# Patient Record
Sex: MALE | Race: WHITE | NOT HISPANIC OR LATINO | Employment: OTHER | URBAN - METROPOLITAN AREA
[De-identification: names, ages, dates, MRNs, and addresses within clinical notes are randomized per-mention and may not be internally consistent; named-entity substitution may affect disease eponyms.]

---

## 2022-01-20 ENCOUNTER — TELEPHONE (OUTPATIENT)
Dept: HEMATOLOGY ONCOLOGY | Facility: CLINIC | Age: 77
End: 2022-01-20

## 2022-01-20 NOTE — TELEPHONE ENCOUNTER
Soft Intake Form   Patient Details   Jeannine Loya     1945     [id number]     Reason For Appointment   Who is Calling? [facility]   If not patient, Name? Who is the Referring Doctor? Self Referral   What is the diagnosis? Stage 4 Lung Cancer   Has this diagnosis been confirmed by a biopsy/surgery? If yes, what is the date it was done? Yes   Biopsy done at [facility]? If not, where was it done? No, [address] [facility]   Was imaging done, and was it done at [facility]? If not, where was it done? Yes, [facility] [place], [place]   Have you been seen by another Oncologist?  If so, who and where (name of facility, [facility] and state) Yes, Dr Brenda Campbell  [address], [place]  [phone]   For 2nd Opinions Only: Are you currently undergoing treatment, or are you scheduled to start treatment? If yes, name of facility, [facility] and state     For "History Of" only: Have you completed treatment? Have you had Genetic Testing done in the past?  If so, advise to bring test results to their visit No   Record Gathering Information   Did you advise to have records faxed to [phone]? Yes   Did you advise to have disks sent to the proper address with imaging? ("History of" Patients)  5 years of imaging for breast patients-Mammos, US etc Yes   Scheduling Information   What is the best call back number?    (If the [facility] is calling, please use their number) [phone]   Miscellaneous Information      Patient lives in [place]

## 2022-02-02 ENCOUNTER — DOCUMENTATION (OUTPATIENT)
Dept: HEMATOLOGY ONCOLOGY | Facility: CLINIC | Age: 77
End: 2022-02-02

## 2022-02-02 NOTE — PROGRESS NOTES
Received both paper records and Imaging disks from [facility] [place] [place] [facility]  Imaging disks sent to [facility] radiology reading room  Paper records forwarded to [facility] to upload into Epic

## 2022-02-04 ENCOUNTER — HOSPITAL ENCOUNTER (OUTPATIENT)
Dept: RADIOLOGY | Facility: HOSPITAL | Age: 77
Discharge: HOME | End: 2022-02-04
Attending: RADIOLOGY

## 2022-02-04 DIAGNOSIS — Z76.89 REFERRAL OF PATIENT WITHOUT EXAMINATION OR TREATMENT: ICD-10-CM

## 2022-02-07 ENCOUNTER — DOCUMENTATION (OUTPATIENT)
Dept: HEMATOLOGY ONCOLOGY | Facility: CLINIC | Age: 77
End: 2022-02-07

## 2022-02-07 NOTE — PROGRESS NOTES
Nika Mckinley returned my call  He states that he does not need the disks from his imaging and would like for them to be destroyed  He inquired about getting set up with a primary care physician for when he finishes moving into [place] in May  I provided him with the [facility]'s website and suggested he browse the available providers and call to schedule an appointment to initiate care  I reminded him to have his ins info available  He was appreciative for the assistance

## 2022-02-07 NOTE — PROGRESS NOTES
I reached out and left VM for Octavio to let him know that the images from [facility] have been uploaded into our system and are ready for his initial visit with Dr Mary Byers in June  I was inquiring if he would like me to send him the disks of his imaging for his personal record or if he would like me to have the disks destroyed  I left my return number [phone]

## 2022-03-18 ENCOUNTER — TELEPHONE (OUTPATIENT)
Dept: HEMATOLOGY ONCOLOGY | Facility: HOSPITAL | Age: 77
End: 2022-03-18

## 2022-03-18 NOTE — TELEPHONE ENCOUNTER
I left message for patient to let him know that the dr's schedule has changed and we are moving his appt from [email or web address] to Monday, [email or web address]  I also left him the number for the [facility] AT [place] should he need to change his appt

## 2022-04-04 ENCOUNTER — TELEPHONE (OUTPATIENT)
Dept: HEMATOLOGY ONCOLOGY | Facility: CLINIC | Age: 77
End: 2022-04-04

## 2022-04-04 NOTE — TELEPHONE ENCOUNTER
Appointment Cancellation Or Reschedule     Person calling in patient   Provider [facility] Visit Date and Time 6/13 @ 940   Office Visit Location [place]   Did patient want to reschedule their office appointment? If so, when was it scheduled to? Yes, 5/18 @ 940   Is this patient calling to reschedule an infusion appointment? no   When is their next infusion appointment? n/a   Is this patient a Chemo patient? no   Reason for Cancellation or Reschedule Patient would like to be seen sooner because his moving dates have changed  If the patient is a treatment patient, please route this to the office nurse  If the patient is not on treatment, please route to the office MA

## 2022-05-18 ENCOUNTER — CONSULT (OUTPATIENT)
Dept: HEMATOLOGY ONCOLOGY | Facility: HOSPITAL | Age: 77
End: 2022-05-18
Payer: MEDICARE

## 2022-05-18 VITALS
OXYGEN SATURATION: 96 % | TEMPERATURE: 96.9 F | HEART RATE: 83 BPM | SYSTOLIC BLOOD PRESSURE: 140 MMHG | HEIGHT: 72 IN | BODY MASS INDEX: 33.72 KG/M2 | RESPIRATION RATE: 16 BRPM | DIASTOLIC BLOOD PRESSURE: 68 MMHG | WEIGHT: 249 LBS

## 2022-05-18 DIAGNOSIS — C34.90 NSCLC METASTATIC TO BONE (HCC): Primary | ICD-10-CM

## 2022-05-18 DIAGNOSIS — C79.51 NSCLC METASTATIC TO BONE (HCC): Primary | ICD-10-CM

## 2022-05-18 PROCEDURE — 99205 OFFICE O/P NEW HI 60 MIN: CPT | Performed by: INTERNAL MEDICINE

## 2022-05-18 RX ORDER — SODIUM CHLORIDE 9 MG/ML
20 INJECTION, SOLUTION INTRAVENOUS ONCE
Status: CANCELLED | OUTPATIENT
Start: 2022-06-02

## 2022-05-18 NOTE — PROGRESS NOTES
Oncology Outpatient Consult Note  Davina Garcia 65 y o  male MRN: @ Encounter: [id number]        Date:  5/18/2022        CC:  Establish care for Stage IV NSCLC      HPI:  Davina Garcia is seen for initial consultation 5/18/2022 to establish care for his stage IV NSCLC  See onc history for a summary of his prior treatment  He has been on pembro since July 2019 and is in at least a partial remission  He has also been on zometa ever 6 weeks since diagnosis  His last PET scan was in dec 2021 and there was concern for new COOPER and LLL nodules that looked more like infectious or inflammatory  His wife had a copy of the report of a CT scan done in feb 2022 to follow this up and they were stable  His oncologist thought they had been there on prior scans in retrospect  He was also only zometa up until he left her practice  He was treated in [place] and has moved to [place] to care for his elderly mother in law  He is here with his wife and they live on a farm  He is very active taking care of the property  He has had chronic issues with balance that have never been linked to his lung cancer diagnosis  He does have severe spinal stenosis  His only side effect from [place] is an itchy diffuse macular rash on his arms and shoulders  He using triamcinolone cream and that controls the itch  He denies any diarrhea  No thyroid problems  No cough or SOB  His other medical problems include liver cirrhosis, HTN, DM, ostomy after a perforated diverticular abscess  He has a prior tob hx for 50 pack years  He drinks 2 glasses of wine per day  He is a retired   His father had colon cancer in his [de-identified]  ROS: As stated in history of present illness otherwise her 14 point review of systems today was negative      ECOG PS: 0    Cancer Staging:  Cancer Staging  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 [facility])  Staging form: Lung, AJCC 8th Edition  - Clinical stage from 3/1/2018: Stage IVB (cTX, cN2, cM1c) - Signed by Lisbeth Booker DO on 5/18/2022      Molecular Testing:     Oncology History Overview Note   3/2018 - diagnosed with 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, PDL-1 20%    3/2018 - 7/2018 - carbo/alimta x 6    8/2018 - 7/2019 - maintenance alimta    7/2019 - pembrolizumab every 3 weeks, RT to T11 to L1          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 ([facility] [address] )   3/1/2018 -  Cancer Staged    Staging form: Lung, AJCC 8th Edition  - Clinical stage from 3/1/2018: Stage IVB (cTX, cN2, cM1c) - Signed by Lisbeth Booker DO on 5/18/2022 5/18/2022 Initial Diagnosis    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 ([facility] [address] )     5/25/2022 -  Chemotherapy    pembrolizumab (KEYTRUDA) IVPB, 200 mg, Intravenous, Once, 0 of 6 cycles             Test Results:    Imaging: No results found  Labs: No results found for: WBC, HGB, HCT, MCV, PLT  No results found for: NA, K, CL, CO2, ANIONGAP, BUN, CREATININE, GLUCOSE, GLUF, CALCIUM, CORRECTEDCA, AST, ALT, ALKPHOS, PROT, BILITOT, EGFR      No results found for: SPEP, UPEP    No results found for: PSA    No results found for: CEA    No results found for: AFP    No results found for: IRON, TIBC, FERRITIN    No results found for: HEXSSTGG14            Active Problems:   Patient Active Problem List   Diagnosis    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 [facility])       Past Medical History: No past medical history on file  Surgical History: No past surgical history on file  Family History:  No family history on file  Cancer-related family history is not on file      Social History:   Social History     Socioeconomic History    Marital status: /Civil Union     Spouse name: Not on file    Number of children: Not on file    Years of education: Not on file    Highest education level: Not on file   Occupational History    Not on file   Tobacco Use    Smoking status: Former Smoker     Packs/day: 1 00     Years: 50 00     Pack years: 50 00     Types: Cigarettes     Start date: 1957 Quit date:      Years since quittin 3    Smokeless tobacco: Not on file   Substance and Sexual Activity    Alcohol use: Yes     Alcohol/week: 21 0 standard drinks     Types: 21 Glasses of wine per week    Drug use: Never    Sexual activity: Not Currently     Partners: Female   Other Topics Concern    Not on file   Social History Narrative    Not on file     Social Determinants of Health     Financial Resource Strain: Not on file   Food Insecurity: Not on file   Transportation Needs: Not on file   Physical Activity: Not on file   Stress: Not on file   Social Connections: Not on file   Intimate Partner Violence: Not on file   Housing Stability: Not on file       Current Medications:   No current outpatient medications on file  No current facility-administered medications for this visit  Allergies: Allergies   Allergen Reactions    Aspirin Hives         Physical Exam:    Body surface area is 2 34 meters squared  Wt Readings from Last 3 Encounters:   22 113 kg (249 lb)        Temp Readings from Last 3 Encounters:   22 (!) 96 9 °F (36 1 °C) (Tympanic)        BP Readings from Last 3 Encounters:   22 140/68         Pulse Readings from Last 3 Encounters:   22 83     @LASTSAO2(3)@    Physical Exam     Constitutional   General appearance: No acute distress, well appearing and well nourished  Eyes   Conjunctiva and lids: No swelling, erythema or discharge  Pupils and irises: Equal, round and reactive to light  Ears, Nose, Mouth, and Throat   External inspection of ears and nose: Normal     Nasal mucosa, septum, and turbinates: Normal without edema or erythema  Oropharynx: Normal with no erythema, edema, exudate or lesions  Pulmonary   Respiratory effort: No increased work of breathing or signs of respiratory distress  Auscultation of lungs: Clear to auscultation  Cardiovascular   Palpation of heart: Normal PMI, no thrills      Auscultation of heart: Normal rate and rhythm, normal S1 and S2, without murmurs  Examination of extremities for edema and/or varicosities: Normal     Carotid pulses: Normal     Abdomen   Abdomen: Non-tender, no masses  ostomy in place, c/d/i  Liver and spleen: No hepatomegaly or splenomegaly  Lymphatic   Palpation of lymph nodes in neck: No lymphadenopathy  Musculoskeletal   Gait and station: Normal     Digits and nails: Normal without clubbing or cyanosis  Inspection/palpation of joints, bones, and muscles: Normal     Skin   Diffuse macular rash  Neurologic   Cranial nerves: Cranial nerves 2-12 intact  Sensation: No sensory loss  Psychiatric   Orientation to person, place, and time: Normal     Mood and affect: Normal           Assessment/ Plan:  66 yo male with Stage IV adenocarcinoma of the lung in remission on maintenance pembro for the past 3 years  We discussed the option of stopping the pembro and observing  This makes the patient extremely nervious and the reality is that we really don't know how long to continue these medications  He is not having a lot of side effects or cost from pembro, so I am fine with continuing it  Risks and benefits of pembro were reviewed and consent was signed  I will see him back in 4-5 weeks after his second cycle here with a PET scan to assess ongoing disease response  I am going to hold off on restarting zometa for now  If he is still in remission, I like to give patient a break from this bone modifying agent  His teeth are healthy and he doesn't need any dental work at this time  He has a port and we will draw port labs 2 days prior to each dose of pembro  I spent 60 minutes in chart review, face to face counseling, coordination of care, and documentation

## 2022-05-20 LAB
LEFT EYE DIABETIC RETINOPATHY: NORMAL
RIGHT EYE DIABETIC RETINOPATHY: NORMAL

## 2022-05-23 ENCOUNTER — HOSPITAL ENCOUNTER (OUTPATIENT)
Dept: INFUSION CENTER | Facility: HOSPITAL | Age: 77
Discharge: HOME | End: 2022-05-23
Payer: MEDICARE

## 2022-05-23 VITALS — TEMPERATURE: 98.2 F

## 2022-05-23 DIAGNOSIS — C34.90 NSCLC METASTATIC TO BONE (HCC): Primary | ICD-10-CM

## 2022-05-23 DIAGNOSIS — C79.51 NSCLC METASTATIC TO BONE (HCC): Primary | ICD-10-CM

## 2022-05-23 LAB
ALBUMIN SERPL BCP-MCNC: 3.3 G/DL (ref 3.5–5)
ALP SERPL-CCNC: 89 U/L (ref 46–116)
ALT SERPL W P-5'-P-CCNC: 30 U/L (ref 12–78)
ANION GAP SERPL CALCULATED.3IONS-SCNC: 8 MMOL/L (ref 4–13)
AST SERPL W P-5'-P-CCNC: 30 U/L (ref 5–45)
BASOPHILS # BLD AUTO: 0.04 THOUSANDS/ΜL (ref 0–0.1)
BASOPHILS NFR BLD AUTO: 1 % (ref 0–1)
BILIRUB SERPL-MCNC: 0.6 MG/DL (ref 0.2–1)
BUN SERPL-MCNC: 19 MG/DL (ref 5–25)
CALCIUM ALBUM COR SERPL-MCNC: 9.2 MG/DL (ref 8.3–10.1)
CALCIUM SERPL-MCNC: 8.6 MG/DL (ref 8.3–10.1)
CHLORIDE SERPL-SCNC: 110 MMOL/L (ref 100–108)
CO2 SERPL-SCNC: 27 MMOL/L (ref 21–32)
CREAT SERPL-MCNC: 0.97 MG/DL (ref 0.6–1.3)
EOSINOPHIL # BLD AUTO: 0.12 THOUSAND/ΜL (ref 0–0.61)
EOSINOPHIL NFR BLD AUTO: 3 % (ref 0–6)
ERYTHROCYTE [DISTWIDTH] IN BLOOD BY AUTOMATED COUNT: 14.4 % (ref 11.6–15.1)
GFR SERPL CREATININE-BSD FRML MDRD: 74 ML/MIN/1.73SQ M
GLUCOSE SERPL-MCNC: 109 MG/DL (ref 65–140)
HCT VFR BLD AUTO: 44.9 % (ref 36.5–49.3)
HGB BLD-MCNC: 14.6 G/DL (ref 12–17)
IMM GRANULOCYTES # BLD AUTO: 0.02 THOUSAND/UL (ref 0–0.2)
IMM GRANULOCYTES NFR BLD AUTO: 1 % (ref 0–2)
LYMPHOCYTES # BLD AUTO: 0.43 THOUSANDS/ΜL (ref 0.6–4.47)
LYMPHOCYTES NFR BLD AUTO: 10 % (ref 14–44)
MCH RBC QN AUTO: 31.3 PG (ref 26.8–34.3)
MCHC RBC AUTO-ENTMCNC: 32.5 G/DL (ref 31.4–37.4)
MCV RBC AUTO: 96 FL (ref 82–98)
MONOCYTES # BLD AUTO: 0.48 THOUSAND/ΜL (ref 0.17–1.22)
MONOCYTES NFR BLD AUTO: 11 % (ref 4–12)
NEUTROPHILS # BLD AUTO: 3.14 THOUSANDS/ΜL (ref 1.85–7.62)
NEUTS SEG NFR BLD AUTO: 74 % (ref 43–75)
NRBC BLD AUTO-RTO: 0 /100 WBCS
PLATELET # BLD AUTO: 160 THOUSANDS/UL (ref 149–390)
PMV BLD AUTO: 12 FL (ref 8.9–12.7)
POTASSIUM SERPL-SCNC: 3.5 MMOL/L (ref 3.5–5.3)
PROT SERPL-MCNC: 7.1 G/DL (ref 6.4–8.2)
RBC # BLD AUTO: 4.66 MILLION/UL (ref 3.88–5.62)
SODIUM SERPL-SCNC: 145 MMOL/L (ref 136–145)
T3FREE SERPL-MCNC: 2.58 PG/ML (ref 2.3–4.2)
TSH SERPL DL<=0.05 MIU/L-ACNC: 1.19 UIU/ML (ref 0.45–4.5)
WBC # BLD AUTO: 4.23 THOUSAND/UL (ref 4.31–10.16)

## 2022-05-23 PROCEDURE — 85025 COMPLETE CBC W/AUTO DIFF WBC: CPT | Performed by: INTERNAL MEDICINE

## 2022-05-23 PROCEDURE — 80053 COMPREHEN METABOLIC PANEL: CPT | Performed by: INTERNAL MEDICINE

## 2022-05-23 PROCEDURE — 84443 ASSAY THYROID STIM HORMONE: CPT | Performed by: INTERNAL MEDICINE

## 2022-05-23 PROCEDURE — 84481 FREE ASSAY (FT-3): CPT | Performed by: INTERNAL MEDICINE

## 2022-05-23 NOTE — PROGRESS NOTES
Pt arrived amb with cane and wife for port labs  Recently moved from [address] [place]  Gets Keytruda infusions  Port accessed [employer], brisk blood return, labs obtained, de-accessed  Dsd applied  Disch amb to home with cane, steady gait  MSK: + joint pain, right ankle      See HPI

## 2022-05-24 ENCOUNTER — HOSPITAL ENCOUNTER (OUTPATIENT)
Dept: RADIOLOGY | Age: 77
Discharge: HOME | End: 2022-05-24
Payer: MEDICARE

## 2022-05-24 DIAGNOSIS — C34.90 NSCLC METASTATIC TO BONE (HCC): ICD-10-CM

## 2022-05-24 DIAGNOSIS — C79.51 NSCLC METASTATIC TO BONE (HCC): ICD-10-CM

## 2022-05-24 LAB — GLUCOSE SERPL-MCNC: 86 MG/DL (ref 65–140)

## 2022-05-24 PROCEDURE — A9552 F18 FDG: HCPCS

## 2022-05-24 PROCEDURE — 82948 REAGENT STRIP/BLOOD GLUCOSE: CPT

## 2022-05-24 PROCEDURE — G1004 CDSM NDSC: HCPCS

## 2022-05-24 PROCEDURE — 78815 PET IMAGE W/CT SKULL-THIGH: CPT

## 2022-05-25 ENCOUNTER — HOSPITAL ENCOUNTER (OUTPATIENT)
Dept: INFUSION CENTER | Facility: HOSPITAL | Age: 77
Discharge: HOME | End: 2022-05-25
Payer: MEDICARE

## 2022-05-25 VITALS
RESPIRATION RATE: 16 BRPM | BODY MASS INDEX: 33.36 KG/M2 | SYSTOLIC BLOOD PRESSURE: 137 MMHG | DIASTOLIC BLOOD PRESSURE: 64 MMHG | WEIGHT: 246 LBS | TEMPERATURE: 98.3 F | HEART RATE: 70 BPM

## 2022-05-25 DIAGNOSIS — C34.90 NSCLC METASTATIC TO BONE (HCC): Primary | ICD-10-CM

## 2022-05-25 DIAGNOSIS — C79.51 NSCLC METASTATIC TO BONE (HCC): Primary | ICD-10-CM

## 2022-05-25 PROCEDURE — 96413 CHEMO IV INFUSION 1 HR: CPT

## 2022-05-25 RX ORDER — DORZOLAMIDE HCL 20 MG/ML
SOLUTION/ DROPS OPHTHALMIC
COMMUNITY
Start: 2022-04-13

## 2022-05-25 RX ORDER — GLIPIZIDE 2.5 MG/1
2.5 TABLET, EXTENDED RELEASE ORAL DAILY
COMMUNITY
Start: 2022-05-09 | End: 2022-08-08 | Stop reason: SDUPTHER

## 2022-05-25 RX ORDER — BETAMETHASONE DIPROPIONATE 0.5 MG/G
CREAM TOPICAL
COMMUNITY
Start: 2022-04-14

## 2022-05-25 RX ORDER — BLOOD SUGAR DIAGNOSTIC
STRIP MISCELLANEOUS DAILY
COMMUNITY
Start: 2022-04-18 | End: 2022-07-19 | Stop reason: SDUPTHER

## 2022-05-25 RX ORDER — LANCETS
EACH MISCELLANEOUS
COMMUNITY
Start: 2022-03-05 | End: 2022-07-19 | Stop reason: SDUPTHER

## 2022-05-25 RX ORDER — TIMOLOL MALEATE 5 MG/ML
SOLUTION/ DROPS OPHTHALMIC
COMMUNITY
Start: 2022-04-12

## 2022-05-25 RX ORDER — SODIUM CHLORIDE 9 MG/ML
20 INJECTION, SOLUTION INTRAVENOUS ONCE
Status: COMPLETED | OUTPATIENT
Start: 2022-05-25 | End: 2022-05-25

## 2022-05-25 RX ORDER — LISINOPRIL 20 MG/1
20 TABLET ORAL DAILY
COMMUNITY
Start: 2022-05-09

## 2022-05-25 RX ORDER — POTASSIUM CHLORIDE 20 MEQ/1
20 TABLET, EXTENDED RELEASE ORAL DAILY
COMMUNITY
Start: 2022-04-18 | End: 2022-07-25 | Stop reason: SDUPTHER

## 2022-05-25 RX ORDER — HYDROCHLOROTHIAZIDE 25 MG/1
25 TABLET ORAL DAILY
COMMUNITY
Start: 2022-05-09

## 2022-05-25 RX ORDER — LOVASTATIN 20 MG/1
20 TABLET ORAL DAILY
COMMUNITY
Start: 2022-05-09

## 2022-05-25 RX ADMIN — SODIUM CHLORIDE 200 MG: 9 INJECTION, SOLUTION INTRAVENOUS at 12:52

## 2022-05-25 RX ADMIN — SODIUM CHLORIDE 20 ML/HR: 9 INJECTION, SOLUTION INTRAVENOUS at 12:52

## 2022-05-25 NOTE — PROGRESS NOTES
Pt tolerated chemo treatment with no adverse reaction  Pt left unit ambulatory with steady gait    AVS printed and given to pt

## 2022-06-13 ENCOUNTER — HOSPITAL ENCOUNTER (OUTPATIENT)
Dept: INFUSION CENTER | Facility: HOSPITAL | Age: 77
Discharge: HOME | End: 2022-06-13
Payer: MEDICARE

## 2022-06-13 DIAGNOSIS — C34.90 NSCLC METASTATIC TO BONE (HCC): Primary | ICD-10-CM

## 2022-06-13 DIAGNOSIS — C79.51 NSCLC METASTATIC TO BONE (HCC): Primary | ICD-10-CM

## 2022-06-13 LAB
ALBUMIN SERPL BCP-MCNC: 3.1 G/DL (ref 3.5–5)
ALP SERPL-CCNC: 92 U/L (ref 46–116)
ALT SERPL W P-5'-P-CCNC: 33 U/L (ref 12–78)
ANION GAP SERPL CALCULATED.3IONS-SCNC: 9 MMOL/L (ref 4–13)
AST SERPL W P-5'-P-CCNC: 25 U/L (ref 5–45)
BASOPHILS # BLD AUTO: 0.03 THOUSANDS/ΜL (ref 0–0.1)
BASOPHILS NFR BLD AUTO: 1 % (ref 0–1)
BILIRUB SERPL-MCNC: 0.5 MG/DL (ref 0.2–1)
BUN SERPL-MCNC: 13 MG/DL (ref 5–25)
CALCIUM ALBUM COR SERPL-MCNC: 9 MG/DL (ref 8.3–10.1)
CALCIUM SERPL-MCNC: 8.3 MG/DL (ref 8.3–10.1)
CHLORIDE SERPL-SCNC: 108 MMOL/L (ref 100–108)
CO2 SERPL-SCNC: 26 MMOL/L (ref 21–32)
CREAT SERPL-MCNC: 1.05 MG/DL (ref 0.6–1.3)
EOSINOPHIL # BLD AUTO: 0.16 THOUSAND/ΜL (ref 0–0.61)
EOSINOPHIL NFR BLD AUTO: 5 % (ref 0–6)
ERYTHROCYTE [DISTWIDTH] IN BLOOD BY AUTOMATED COUNT: 14.6 % (ref 11.6–15.1)
GFR SERPL CREATININE-BSD FRML MDRD: 67 ML/MIN/1.73SQ M
GLUCOSE SERPL-MCNC: 200 MG/DL (ref 65–140)
HCT VFR BLD AUTO: 45.1 % (ref 36.5–49.3)
HGB BLD-MCNC: 14.5 G/DL (ref 12–17)
IMM GRANULOCYTES # BLD AUTO: 0.01 THOUSAND/UL (ref 0–0.2)
IMM GRANULOCYTES NFR BLD AUTO: 0 % (ref 0–2)
LYMPHOCYTES # BLD AUTO: 0.37 THOUSANDS/ΜL (ref 0.6–4.47)
LYMPHOCYTES NFR BLD AUTO: 11 % (ref 14–44)
MCH RBC QN AUTO: 30.8 PG (ref 26.8–34.3)
MCHC RBC AUTO-ENTMCNC: 32.2 G/DL (ref 31.4–37.4)
MCV RBC AUTO: 96 FL (ref 82–98)
MONOCYTES # BLD AUTO: 0.3 THOUSAND/ΜL (ref 0.17–1.22)
MONOCYTES NFR BLD AUTO: 9 % (ref 4–12)
NEUTROPHILS # BLD AUTO: 2.53 THOUSANDS/ΜL (ref 1.85–7.62)
NEUTS SEG NFR BLD AUTO: 74 % (ref 43–75)
NRBC BLD AUTO-RTO: 0 /100 WBCS
PLATELET # BLD AUTO: 157 THOUSANDS/UL (ref 149–390)
PMV BLD AUTO: 11.8 FL (ref 8.9–12.7)
POTASSIUM SERPL-SCNC: 3.4 MMOL/L (ref 3.5–5.3)
PROT SERPL-MCNC: 6.6 G/DL (ref 6.4–8.2)
RBC # BLD AUTO: 4.71 MILLION/UL (ref 3.88–5.62)
SODIUM SERPL-SCNC: 143 MMOL/L (ref 136–145)
TSH SERPL DL<=0.05 MIU/L-ACNC: 1.53 UIU/ML (ref 0.45–4.5)
WBC # BLD AUTO: 3.4 THOUSAND/UL (ref 4.31–10.16)

## 2022-06-13 PROCEDURE — 84481 FREE ASSAY (FT-3): CPT | Performed by: INTERNAL MEDICINE

## 2022-06-13 PROCEDURE — 84443 ASSAY THYROID STIM HORMONE: CPT | Performed by: INTERNAL MEDICINE

## 2022-06-13 PROCEDURE — 85025 COMPLETE CBC W/AUTO DIFF WBC: CPT | Performed by: INTERNAL MEDICINE

## 2022-06-13 PROCEDURE — 80053 COMPREHEN METABOLIC PANEL: CPT | Performed by: INTERNAL MEDICINE

## 2022-06-13 NOTE — PROGRESS NOTES
Port labs drawn per protocol, pt then discharged to home with steady gait  AVS declined, aware of next appt

## 2022-06-14 ENCOUNTER — OFFICE VISIT (OUTPATIENT)
Dept: FAMILY MEDICINE CLINIC | Facility: HOSPITAL | Age: 77
End: 2022-06-14
Payer: MEDICARE

## 2022-06-14 VITALS
HEART RATE: 65 BPM | SYSTOLIC BLOOD PRESSURE: 119 MMHG | BODY MASS INDEX: 33.56 KG/M2 | OXYGEN SATURATION: 97 % | DIASTOLIC BLOOD PRESSURE: 61 MMHG | WEIGHT: 247.8 LBS | HEIGHT: 72 IN | TEMPERATURE: 98 F

## 2022-06-14 DIAGNOSIS — E78.2 MIXED HYPERLIPIDEMIA: ICD-10-CM

## 2022-06-14 DIAGNOSIS — Z93.3 COLOSTOMY IN PLACE (HCC): ICD-10-CM

## 2022-06-14 DIAGNOSIS — H60.331 SWIMMER'S EAR OF RIGHT SIDE, UNSPECIFIED CHRONICITY: ICD-10-CM

## 2022-06-14 DIAGNOSIS — I10 ESSENTIAL HYPERTENSION: ICD-10-CM

## 2022-06-14 DIAGNOSIS — R42 DYSEQUILIBRIUM: ICD-10-CM

## 2022-06-14 DIAGNOSIS — E11.65 TYPE 2 DIABETES MELLITUS WITH HYPERGLYCEMIA, WITHOUT LONG-TERM CURRENT USE OF INSULIN (HCC): Primary | ICD-10-CM

## 2022-06-14 DIAGNOSIS — D49.89 NEOPLASM OF ARM: ICD-10-CM

## 2022-06-14 DIAGNOSIS — C34.90 NSCLC METASTATIC TO BONE (HCC): ICD-10-CM

## 2022-06-14 DIAGNOSIS — C79.51 NSCLC METASTATIC TO BONE (HCC): Primary | ICD-10-CM

## 2022-06-14 DIAGNOSIS — C79.51 NSCLC METASTATIC TO BONE (HCC): ICD-10-CM

## 2022-06-14 DIAGNOSIS — C34.90 NSCLC METASTATIC TO BONE (HCC): Primary | ICD-10-CM

## 2022-06-14 PROBLEM — H40.9 GLAUCOMA OF BOTH EYES: Status: ACTIVE | Noted: 2022-06-14

## 2022-06-14 PROBLEM — E87.6 HYPOKALEMIA: Status: ACTIVE | Noted: 2022-06-14

## 2022-06-14 LAB — T3FREE SERPL-MCNC: 2.64 PG/ML (ref 2.3–4.2)

## 2022-06-14 PROCEDURE — 99204 OFFICE O/P NEW MOD 45 MIN: CPT | Performed by: FAMILY MEDICINE

## 2022-06-14 RX ORDER — DORZOLAMIDE HYDROCHLORIDE AND TIMOLOL MALEATE 20; 5 MG/ML; MG/ML
SOLUTION/ DROPS OPHTHALMIC
COMMUNITY
Start: 2022-04-22

## 2022-06-14 RX ORDER — CHOLECALCIFEROL (VITAMIN D3) 1250 MCG
CAPSULE ORAL
COMMUNITY

## 2022-06-14 RX ORDER — SODIUM CHLORIDE 9 MG/ML
20 INJECTION, SOLUTION INTRAVENOUS ONCE
Status: CANCELLED | OUTPATIENT
Start: 2022-06-15

## 2022-06-14 NOTE — PROGRESS NOTES
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 [facility])    Essential hypertension    Mixed hyperlipidemia  -     Lipid Panel with Direct LDL reflex; Future    Colostomy in place [facility])    Dysequilibrium    Neoplasm of arm  -     Ambulatory Referral to Dermatology; Future    Swimmer's ear of right side, unspecified chronicity  -     neomycin-polymyxin-hydrocortisone (CORTISPORIN) otic solution; Administer 4 drops to the right ear every 8 (eight) hours    Other orders  -     Cholecalciferol (Vitamin D3) 1 25 MG (90151 UT) CAPS; Take by mouth  -     Calcium Carb-Cholecalciferol (CALCIUM 1000 + D PO); Take by mouth          Subjective:      Patient ID: Henry Glasgow is a 65 y o  male  New patient visit to establish care    Was seeing previous doctor every 4-6 months    Moved from [place]    Treating [facility]  DM dropped 50 points  Has been as low as 67 but no hypoglycemic symptoms  Last HgbA1c was 6 1    Ostomy done for diverticular abscess  He did have the option to reconnect but he prefers not to    [facility] every 3 weeks    Sharp pain in R ear last few weeks    Losing balance at times, equilibrium      The following portions of the patient's history were reviewed and updated as appropriate: allergies, current medications, past family history, past medical history, past social history, past surgical history and problem list     Review of Systems   Constitutional: Negative  HENT: Positive for ear pain  Respiratory: Negative  Cardiovascular: Negative  Musculoskeletal: Positive for arthralgias  Neurological: Positive for dizziness  Psychiatric/Behavioral: Negative  All other systems reviewed and are negative          Objective:      /61 (BP Location: Left arm, Patient Position: Sitting, Cuff Size: Large) Pulse 65   Temp 98 °F (36 7 °C) (Tympanic)   Ht 6' (1 829 m)   Wt 112 kg (247 lb 12 8 oz)   SpO2 97%   BMI 33 61 kg/m²          Physical Exam  Vitals and nursing note reviewed  Constitutional:       Appearance: Normal appearance  HENT:      Right Ear: Tenderness present  Left Ear: Tympanic membrane, ear canal and external ear normal    Cardiovascular:      Rate and Rhythm: Normal rate and regular rhythm  Pulses: Normal pulses  Heart sounds: Normal heart sounds  Pulmonary:      Effort: Pulmonary effort is normal       Breath sounds: Normal breath sounds  Musculoskeletal:      Right lower leg: No edema  Left lower leg: No edema  Skin:     Findings: No rash  Neurological:      General: No focal deficit present  Mental Status: He is alert and oriented to person, place, and time     Psychiatric:         Mood and Affect: Mood normal

## 2022-06-15 ENCOUNTER — HOSPITAL ENCOUNTER (OUTPATIENT)
Dept: INFUSION CENTER | Facility: HOSPITAL | Age: 77
Discharge: HOME | End: 2022-06-15
Payer: MEDICARE

## 2022-06-15 ENCOUNTER — OFFICE VISIT (OUTPATIENT)
Dept: HEMATOLOGY ONCOLOGY | Facility: HOSPITAL | Age: 77
End: 2022-06-15
Payer: MEDICARE

## 2022-06-15 VITALS
SYSTOLIC BLOOD PRESSURE: 130 MMHG | OXYGEN SATURATION: 96 % | DIASTOLIC BLOOD PRESSURE: 70 MMHG | HEART RATE: 63 BPM | HEIGHT: 72 IN | BODY MASS INDEX: 33.46 KG/M2 | TEMPERATURE: 98.3 F | RESPIRATION RATE: 16 BRPM | WEIGHT: 247 LBS

## 2022-06-15 VITALS
TEMPERATURE: 97.3 F | HEART RATE: 58 BPM | SYSTOLIC BLOOD PRESSURE: 135 MMHG | DIASTOLIC BLOOD PRESSURE: 67 MMHG | RESPIRATION RATE: 16 BRPM | OXYGEN SATURATION: 98 %

## 2022-06-15 DIAGNOSIS — C34.90 NSCLC METASTATIC TO BONE (HCC): Primary | ICD-10-CM

## 2022-06-15 DIAGNOSIS — C79.51 NSCLC METASTATIC TO BONE (HCC): Primary | ICD-10-CM

## 2022-06-15 PROCEDURE — 96413 CHEMO IV INFUSION 1 HR: CPT

## 2022-06-15 PROCEDURE — 1123F ACP DISCUSS/DSCN MKR DOCD: CPT | Performed by: INTERNAL MEDICINE

## 2022-06-15 PROCEDURE — 99213 OFFICE O/P EST LOW 20 MIN: CPT | Performed by: INTERNAL MEDICINE

## 2022-06-15 RX ORDER — SODIUM CHLORIDE 9 MG/ML
20 INJECTION, SOLUTION INTRAVENOUS ONCE
Status: COMPLETED | OUTPATIENT
Start: 2022-06-15 | End: 2022-06-15

## 2022-06-15 RX ORDER — SODIUM CHLORIDE 9 MG/ML
20 INJECTION, SOLUTION INTRAVENOUS ONCE
Status: CANCELLED | OUTPATIENT
Start: 2022-07-06

## 2022-06-15 RX ADMIN — SODIUM CHLORIDE 20 ML/HR: 9 INJECTION, SOLUTION INTRAVENOUS at 10:47

## 2022-06-15 RX ADMIN — SODIUM CHLORIDE 200 MG: 9 INJECTION, SOLUTION INTRAVENOUS at 10:51

## 2022-06-16 ENCOUNTER — TELEPHONE (OUTPATIENT)
Dept: HEMATOLOGY ONCOLOGY | Facility: CLINIC | Age: 77
End: 2022-06-16

## 2022-06-16 NOTE — TELEPHONE ENCOUNTER
CALL RETURN FORM   Reason for patient call? Patients spouse, Eunice Ness, calling to request lab order for patient  She states he needs to give blood for his upcoming infusion treatment       Patient's primary oncologist? Dr Ken Lee   Name of person the patient was calling for? Dr Ken Lee   Any additional information to add, if applicable? [phone]   Informed patient that the message will be forwarded to the team and someone will get back to them as soon as possible    Did you relay this information to the patient?  yes

## 2022-06-16 NOTE — TELEPHONE ENCOUNTER
Spoke with patient to let him know that I did get his lab appts scheduled for 7/1 and 7/25 at 0900  Patient verbalized understanding

## 2022-06-16 NOTE — TELEPHONE ENCOUNTER
Spoke with patient's wife regarding lab appointments  She is asking if the patient could have lab appts scheduled for 7/1 and 7/25 at [address], preferably in the morning  I told her I will reach out to the infusion room and schedule those appts for him  Patient's wife verbalized understanding

## 2022-06-17 ENCOUNTER — TELEPHONE (OUTPATIENT)
Dept: DERMATOLOGY | Facility: CLINIC | Age: 77
End: 2022-06-17

## 2022-06-17 NOTE — TELEPHONE ENCOUNTER
MISA recd to schedule an appt for neoplasm on arm  Referred by dr Ruthann Kang         Returned call no answer, lvm for him to give us a call back

## 2022-06-19 NOTE — PROGRESS NOTES
HEMATOLOGY / [address] FOLLOW UP NOTE    Primary Care Provider: Rachel Olguin MD  Referring Provider:    MRN: [id number]  : 1945    Reason for Encounter: follow up maintenance pembro for stage IV adeno of the lung      Oncology History Overview Note     3/2018 - diagnosed with stage IV adenocarcinoma of the lung with mets to    bone and adrenal gland, PDL-1 20%      3/2018 - 2018 - carbo/alimta x 6      2018 - 2019 - maintenance alimta      2019 - pembrolizumab every 3 weeks, RT to T11 to L1         Interval History: patient presents for follow up of his adenocarcinoma, stage IV, of the lung  He got his first dose of pembro here  No new side effects occurred with the last dose  Rash is stable  No diarrhea  His PET scan shows a COOPER is smaller and no other areas of active disease  REVIEW OF SYSTEMS:  Please note that a 14-point review of systems was performed to include Constitutional, HEENT, Respiratory, CVS, GI, , Musculoskeletal, Integumentary, Neurologic, Rheumatologic, Endocrinologic, Psychiatric, Lymphatic, and Hematologic/Oncologic systems were reviewed and are negative unless otherwise stated in HPI  Positive and negative findings pertinent to this evaluation are incorporated into the history of present illness  ECOG PS: 1    PROBLEM LIST:  Patient Active Problem List:    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 [facility])    Essential hypertension    Hypokalemia    Mixed hyperlipidemia    Glaucoma of both eyes    Type 2 diabetes mellitus with hyperglycemia, without long-term current use of insulin (HCC)    Colostomy in place [facility])    Dysequilibrium      Assessment / Plan: patient is in remission and doing well with pembrolizumab  We will continue with treatment every 3 weeks  I am going to give him a break from zometa since there is no active bone disease on PET  I will see him in 6 weeks and we will do visits with every other treatment    he knows to call in the interim with any questions or concerns  I spent 20 minutes on chart review, face to face counseling time, coordination of care and documentation  Past Medical History:  has a past medical history of Diabetes mellitus ([address] ), High blood pressure, and High cholesterol  PAST SURGICAL HISTORY:  has a past surgical history that includes Elbow surgery (Left, 2004)  CURRENT MEDICATIONS  Current Outpatient Medications:  Calcium Carb-Cholecalciferol (CALCIUM 1000 + D PO), Take by mouth, Disp: , Rfl:   Cholecalciferol (Vitamin D3) 1 25 MG (34859 UT) CAPS, Take by mouth, Disp: , Rfl:   Contour Next Test test strip, daily Test blood sugar, Disp: , Rfl:   dorzolamide (TRUSOPT) 2 % ophthalmic solution, , Disp: , Rfl:   dorzolamide-timolol (COSOPT) 22 3-6 8 MG/ML ophthalmic solution, INSTILL 1 DROP INTO EACH EYE TWICE DAILY, Disp: , Rfl:   glipiZIDE (GLUCOTROL XL) 2 5 mg 24 hr tablet, Take 2 5 mg by mouth daily, Disp: , Rfl:   hydrochlorothiazide (HYDRODIURIL) 25 mg tablet, Take 25 mg by mouth daily, Disp: , Rfl:   lisinopril (ZESTRIL) 20 mg tablet, Take 20 mg by mouth daily, Disp: , Rfl:   lovastatin (MEVACOR) 20 mg tablet, Take 20 mg by mouth daily, Disp: , Rfl:   Microlet Lancets MISC, USE AS DIRECTED FOR DAILY GLUCOSE TESTING, Disp: , Rfl:   neomycin-polymyxin-hydrocortisone (CORTISPORIN) otic solution, Administer 4 drops to the right ear every 8 (eight) hours, Disp: 10 mL, Rfl: 0  potassium chloride (K-DUR,KLOR-CON) 20 mEq tablet, Take 20 mEq by mouth daily, Disp: , Rfl:   timolol (TIMOPTIC) 0 5 % ophthalmic solution, INSTILL 1 DROP INTO EACH EYE TWICE DAILY, Disp: , Rfl:   betamethasone, augmented, (DIPROLENE-AF) 0 05 % cream, APPLY TOPICALLY TO THE AFFECTED AREA TWICE DAILY FOR USE ON LARGE AREAS OF BODY - CHEST, BACK AND ARMS, Disp: , Rfl:     No current facility-administered medications for this visit  [unfilled]    SOCIAL HISTORY:  reports that he quit smoking about 16 years ago  His smoking use included cigarettes   He started smoking about 65 years ago  He has a 50 00 pack-year smoking history  He has never used smokeless tobacco  He reports current alcohol use of about 21 0 standard drinks of alcohol per week  He reports that he does not use drugs  FAMILY HISTORY:  family history includes Colon cancer in his father  ALLERGIES:  is allergic to aspirin and ibuprofen  Physical Exam:  Vital Signs:   /70 (BP Location: Left arm, Patient Position: Sitting, Cuff Size: Large)   Pulse 63   Temp 98 3 °F (36 8 °C) (Tympanic)   Resp 16   Ht 6' (1 829 m)   Wt 112 kg (247 lb)   SpO2 96%   BMI 33 50 kg/m²   Smoking Status Former Smoker   BSA 2 33 m²   Body mass index is 33 5 kg/m²  Body surface area is 2 33 meters squared  GEN: Alert, awake oriented x3, in no acute distress  HEENT- No pallor, icterus, cyanosis, no oral mucosal lesions,   LAD - no palpable cervical, clavicle, axillary, inguinal LAD  Heart- normal S1 S2, regular rate and rhythm, No murmur, rubs     Lungs- clear breathing sound bilateral    Abdomen- soft, Non tender, bowel sounds present  Extremities- No cyanosis, clubbing, edema  Neuro- No focal neurological deficit    Labs:  Lab Results      Component                Value               Date                      WBC                      3 40 (L)            06/13/2022                HGB                      14 5                06/13/2022                HCT                      45 1                06/13/2022                MCV                      96                  06/13/2022                PLT                      157                 06/13/2022            Lab Results      Component                Value               Date                      SODIUM                   143                 06/13/2022                K                        3 4 (L)             06/13/2022                CL                       108                 06/13/2022                CO2                      26 06/13/2022                AGAP                     9                   06/13/2022                BUN                      13                  06/13/2022                CREATININE               1 05                06/13/2022                GLUC                     200 (H)             06/13/2022                CALCIUM                  8 3                 06/13/2022                AST                      25                  06/13/2022                ALT                      33                  06/13/2022                ALKPHOS                  92                  06/13/2022                TP                       6 6                 06/13/2022                TBILI                    0 50                06/13/2022                EGFR                     67                  06/13/2022

## 2022-07-01 ENCOUNTER — HOSPITAL ENCOUNTER (OUTPATIENT)
Dept: INFUSION CENTER | Facility: HOSPITAL | Age: 77
Discharge: HOME | End: 2022-07-01
Payer: MEDICARE

## 2022-07-01 DIAGNOSIS — C34.90 NSCLC METASTATIC TO BONE (HCC): Primary | ICD-10-CM

## 2022-07-01 DIAGNOSIS — C79.51 NSCLC METASTATIC TO BONE (HCC): Primary | ICD-10-CM

## 2022-07-01 LAB
ALBUMIN SERPL BCP-MCNC: 3.3 G/DL (ref 3.5–5)
ALP SERPL-CCNC: 86 U/L (ref 46–116)
ALT SERPL W P-5'-P-CCNC: 30 U/L (ref 12–78)
ANION GAP SERPL CALCULATED.3IONS-SCNC: 9 MMOL/L (ref 4–13)
AST SERPL W P-5'-P-CCNC: 26 U/L (ref 5–45)
BASOPHILS # BLD AUTO: 0.03 THOUSANDS/ΜL (ref 0–0.1)
BASOPHILS NFR BLD AUTO: 1 % (ref 0–1)
BILIRUB SERPL-MCNC: 0.8 MG/DL (ref 0.2–1)
BUN SERPL-MCNC: 18 MG/DL (ref 5–25)
CALCIUM ALBUM COR SERPL-MCNC: 9.1 MG/DL (ref 8.3–10.1)
CALCIUM SERPL-MCNC: 8.5 MG/DL (ref 8.3–10.1)
CHLORIDE SERPL-SCNC: 104 MMOL/L (ref 100–108)
CO2 SERPL-SCNC: 27 MMOL/L (ref 21–32)
CREAT SERPL-MCNC: 0.97 MG/DL (ref 0.6–1.3)
EOSINOPHIL # BLD AUTO: 0.16 THOUSAND/ΜL (ref 0–0.61)
EOSINOPHIL NFR BLD AUTO: 4 % (ref 0–6)
ERYTHROCYTE [DISTWIDTH] IN BLOOD BY AUTOMATED COUNT: 14.4 % (ref 11.6–15.1)
GFR SERPL CREATININE-BSD FRML MDRD: 74 ML/MIN/1.73SQ M
GLUCOSE SERPL-MCNC: 179 MG/DL (ref 65–140)
HCT VFR BLD AUTO: 45.4 % (ref 36.5–49.3)
HGB BLD-MCNC: 15 G/DL (ref 12–17)
IMM GRANULOCYTES # BLD AUTO: 0.01 THOUSAND/UL (ref 0–0.2)
IMM GRANULOCYTES NFR BLD AUTO: 0 % (ref 0–2)
LYMPHOCYTES # BLD AUTO: 0.33 THOUSANDS/ΜL (ref 0.6–4.47)
LYMPHOCYTES NFR BLD AUTO: 8 % (ref 14–44)
MCH RBC QN AUTO: 31.1 PG (ref 26.8–34.3)
MCHC RBC AUTO-ENTMCNC: 33 G/DL (ref 31.4–37.4)
MCV RBC AUTO: 94 FL (ref 82–98)
MONOCYTES # BLD AUTO: 0.38 THOUSAND/ΜL (ref 0.17–1.22)
MONOCYTES NFR BLD AUTO: 9 % (ref 4–12)
NEUTROPHILS # BLD AUTO: 3.23 THOUSANDS/ΜL (ref 1.85–7.62)
NEUTS SEG NFR BLD AUTO: 78 % (ref 43–75)
NRBC BLD AUTO-RTO: 0 /100 WBCS
PLATELET # BLD AUTO: 153 THOUSANDS/UL (ref 149–390)
PMV BLD AUTO: 11.8 FL (ref 8.9–12.7)
POTASSIUM SERPL-SCNC: 3.2 MMOL/L (ref 3.5–5.3)
PROT SERPL-MCNC: 6.9 G/DL (ref 6.4–8.2)
RBC # BLD AUTO: 4.83 MILLION/UL (ref 3.88–5.62)
SODIUM SERPL-SCNC: 140 MMOL/L (ref 136–145)
T3FREE SERPL-MCNC: 2.7 PG/ML (ref 2.3–4.2)
TSH SERPL DL<=0.05 MIU/L-ACNC: 1.74 UIU/ML (ref 0.45–4.5)
WBC # BLD AUTO: 4.14 THOUSAND/UL (ref 4.31–10.16)

## 2022-07-01 PROCEDURE — 85025 COMPLETE CBC W/AUTO DIFF WBC: CPT | Performed by: INTERNAL MEDICINE

## 2022-07-01 PROCEDURE — 84481 FREE ASSAY (FT-3): CPT | Performed by: INTERNAL MEDICINE

## 2022-07-01 PROCEDURE — 84443 ASSAY THYROID STIM HORMONE: CPT | Performed by: INTERNAL MEDICINE

## 2022-07-01 PROCEDURE — 80053 COMPREHEN METABOLIC PANEL: CPT | Performed by: INTERNAL MEDICINE

## 2022-07-04 ENCOUNTER — HOSPITAL ENCOUNTER (EMERGENCY)
Facility: HOSPITAL | Age: 77
Discharge: HOME | End: 2022-07-04
Attending: EMERGENCY MEDICINE
Payer: MEDICARE

## 2022-07-04 VITALS
BODY MASS INDEX: 33.18 KG/M2 | DIASTOLIC BLOOD PRESSURE: 69 MMHG | TEMPERATURE: 97.8 F | RESPIRATION RATE: 18 BRPM | SYSTOLIC BLOOD PRESSURE: 135 MMHG | WEIGHT: 245 LBS | HEART RATE: 61 BPM | OXYGEN SATURATION: 97 % | HEIGHT: 72 IN

## 2022-07-04 DIAGNOSIS — S39.012A LUMBAR STRAIN, INITIAL ENCOUNTER: Primary | ICD-10-CM

## 2022-07-04 PROCEDURE — 99283 EMERGENCY DEPT VISIT LOW MDM: CPT

## 2022-07-04 PROCEDURE — 99284 EMERGENCY DEPT VISIT MOD MDM: CPT | Performed by: PHYSICIAN ASSISTANT

## 2022-07-04 RX ORDER — LIDOCAINE 50 MG/G
1 PATCH TOPICAL ONCE
Status: DISCONTINUED | OUTPATIENT
Start: 2022-07-04 | End: 2022-07-04 | Stop reason: HOSPADM

## 2022-07-04 RX ORDER — METHYLPREDNISOLONE 4 MG/1
TABLET ORAL
Qty: 21 TABLET | Refills: 0 | Status: SHIPPED | OUTPATIENT
Start: 2022-07-04 | End: 2022-10-17 | Stop reason: ALTCHOICE

## 2022-07-04 RX ORDER — METHOCARBAMOL 500 MG/1
500 TABLET, FILM COATED ORAL 3 TIMES DAILY
Qty: 20 TABLET | Refills: 0 | Status: SHIPPED | OUTPATIENT
Start: 2022-07-04

## 2022-07-04 RX ORDER — METHOCARBAMOL 500 MG/1
500 TABLET, FILM COATED ORAL ONCE
Status: COMPLETED | OUTPATIENT
Start: 2022-07-04 | End: 2022-07-04

## 2022-07-04 RX ADMIN — LIDOCAINE 5% 1 PATCH: 700 PATCH TOPICAL at 13:48

## 2022-07-04 RX ADMIN — METHOCARBAMOL 500 MG: 500 TABLET ORAL at 13:48

## 2022-07-04 NOTE — ED NOTES
Provider at bedside       Ana Lilia Bacon RN  07/04/22 8929 [see HPI] : see HPI [Negative] : Heme/Lymph

## 2022-07-04 NOTE — ED PROVIDER NOTES
History  Chief Complaint   Patient presents with    Hip Pain     Patient presents to ED with right hip pain that began Friday night worsening Saturday morning  Patient denies acute injury  States he does have history of sciatica but reports this pain is different and does not radiate to any other area  Patient is a 64 y/o m with h/o DM, HTN, hyperlipidemia that presents to the ED with right lumbar pain x 3 days  He states Friday he was moving a heavy toilet, but did not feel pain until the next day  No radiation of pain  Movement makes the pain worse, staying still makes it better  He did take tylenol for pain  No bladder dysfunction  Patient has a colostomy, producing normal amount of stool  No abdominal pain  No numbness, weakness, fevers  He has a h/o sciatica 6 months ago, but this pain is in a different area  History provided by:  Patient  Back Pain  Location:  Lumbar spine  Quality:  Aching  Radiates to:  Does not radiate  Pain severity:  Moderate  Onset quality:  Gradual  Duration:  3 days  Timing:  Constant  Progression:  Unchanged  Chronicity:  New  Context: lifting heavy objects    Relieved by:  Being still  Worsened by:  Ambulation and movement  Ineffective treatments:  OTC medications  Associated symptoms: no abdominal pain, no abdominal swelling, no bladder incontinence, no chest pain, no dysuria, no fever, no leg pain, no numbness, no paresthesias, no pelvic pain, no perianal numbness, no tingling and no weakness        Prior to Admission Medications   Prescriptions Last Dose Informant Patient Reported? Taking?    Calcium Carb-Cholecalciferol (CALCIUM 1000 + D PO)   Yes No   Sig: Take by mouth   Cholecalciferol (Vitamin D3) 1 25 MG (55421 UT) CAPS   Yes No   Sig: Take by mouth   Contour Next Test test strip   Yes No   Sig: daily Test blood sugar   Microlet Lancets MISC   Yes No   Sig: USE AS DIRECTED FOR DAILY GLUCOSE TESTING   betamethasone, augmented, (DIPROLENE-AF) 0 05 % cream Yes No   Sig: APPLY TOPICALLY TO THE AFFECTED AREA TWICE DAILY FOR USE ON LARGE AREAS OF BODY - CHEST, BACK AND ARMS   dorzolamide (TRUSOPT) 2 % ophthalmic solution   Yes No   dorzolamide-timolol (COSOPT) 22 3-6 8 MG/ML ophthalmic solution   Yes No   Sig: INSTILL 1 DROP INTO EACH EYE TWICE DAILY   glipiZIDE (GLUCOTROL XL) 2 5 mg 24 hr tablet   Yes No   Sig: Take 2 5 mg by mouth daily   hydrochlorothiazide (HYDRODIURIL) 25 mg tablet   Yes No   Sig: Take 25 mg by mouth daily   lisinopril (ZESTRIL) 20 mg tablet   Yes No   Sig: Take 20 mg by mouth daily   lovastatin (MEVACOR) 20 mg tablet   Yes No   Sig: Take 20 mg by mouth daily   neomycin-polymyxin-hydrocortisone (CORTISPORIN) otic solution   No No   Sig: Administer 4 drops to the right ear every 8 (eight) hours   potassium chloride (K-DUR,KLOR-CON) 20 mEq tablet   Yes No   Sig: Take 20 mEq by mouth daily   timolol (TIMOPTIC) 0 5 % ophthalmic solution   Yes No   Sig: INSTILL 1 DROP INTO EACH EYE TWICE DAILY      Facility-Administered Medications: None       Past Medical History:   Diagnosis Date    Diabetes mellitus ([facility] [address] )     High blood pressure     High cholesterol        Past Surgical History:   Procedure Laterality Date    ELBOW SURGERY Left 2004    pinched nerve       Family History   Problem Relation Age of Onset    Colon cancer Father      I have reviewed and agree with the history as documented  E-Cigarette/Vaping    E-Cigarette Use Never User      E-Cigarette/Vaping Substances    Nicotine No      Social History     Tobacco Use    Smoking status: Former Smoker     Packs/day: 1 00     Years: 50 00     Pack years: 50 00     Types: Cigarettes     Start date: 35     Quit date:      Years since quittin 5    Smokeless tobacco: Never Used   Vaping Use    Vaping Use: Never used   Substance Use Topics    Alcohol use:  Yes     Alcohol/week: 21 0 standard drinks     Types: 21 Glasses of wine per week     Comment: social drinker    Drug use: Never Review of Systems   Constitutional: Negative for chills and fever  HENT: Negative  Respiratory: Negative  Cardiovascular: Negative for chest pain  Gastrointestinal: Negative for abdominal pain  Genitourinary: Negative for bladder incontinence, dysuria and pelvic pain  Musculoskeletal: Positive for back pain  Negative for neck pain  Skin: Negative for color change, pallor and rash  Neurological: Negative for dizziness, tingling, seizures, weakness, light-headedness, numbness and paresthesias  Psychiatric/Behavioral: Negative for confusion  All other systems reviewed and are negative  Physical Exam  Physical Exam  Vitals and nursing note reviewed  Constitutional:       General: He is not in acute distress  Appearance: Normal appearance  He is well-developed, well-groomed and overweight  He is not ill-appearing or diaphoretic  HENT:      Head: Normocephalic and atraumatic  Right Ear: Hearing and external ear normal       Left Ear: Hearing and external ear normal       Nose: Nose normal    Eyes:      Conjunctiva/sclera: Conjunctivae normal    Cardiovascular:      Rate and Rhythm: Normal rate and regular rhythm  Heart sounds: Normal heart sounds  Pulmonary:      Effort: Pulmonary effort is normal       Breath sounds: Decreased breath sounds present  No wheezing, rhonchi or rales  Abdominal:      Comments: Colostomy  Musculoskeletal:      Cervical back: Normal and normal range of motion  No spinous process tenderness or muscular tenderness  Thoracic back: Normal       Lumbar back: Tenderness present  No swelling, edema, deformity, signs of trauma or bony tenderness  Normal range of motion  Negative right straight leg raise test         Back:    Skin:     General: Skin is warm and dry  Findings: No bruising, erythema or rash  Neurological:      Mental Status: He is alert and oriented to person, place, and time  Sensory: Sensation is intact  Motor: Motor function is intact  Gait: Gait is intact  Psychiatric:         Mood and Affect: Mood normal          Speech: Speech normal          Behavior: Behavior is cooperative  Vital Signs  ED Triage Vitals   Temperature Pulse Respirations Blood Pressure SpO2   07/04/22 1118 07/04/22 1116 07/04/22 1116 07/04/22 1116 07/04/22 1116   97 8 °F (36 6 °C) 74 16 149/71 98 %      Temp Source Heart Rate Source Patient Position - Orthostatic VS BP Location FiO2 (%)   07/04/22 1118 07/04/22 1116 07/04/22 1116 07/04/22 1116 --   Temporal Monitor Sitting Left arm       Pain Score       07/04/22 1116       10 - Worst Possible Pain           Vitals:    07/04/22 1116 07/04/22 1348   BP: 149/71 135/69   Pulse: 74 61   Patient Position - Orthostatic VS: Sitting Sitting         Visual Acuity      ED Medications  Medications   lidocaine (LIDODERM) 5 % patch 1 patch (1 patch Topical Medication Applied 7/4/22 1348)   methocarbamol (ROBAXIN) tablet 500 mg (500 mg Oral Given 7/4/22 1348)       Diagnostic Studies  Results Reviewed     None                 No orders to display              Procedures  Procedures         ED Course                               SBIRT 20yo+    Flowsheet Row Most Recent Value   SBIRT (23 yo +)    In order to provide better care to our patients, we are screening all of our patients for alcohol and drug use  Would it be okay to ask you these screening questions? Yes Filed at: 07/04/2022 1337   Initial Alcohol Screen: US AUDIT-C     1  How often do you have a drink containing alcohol? 0 Filed at: 07/04/2022 1337   2  How many drinks containing alcohol do you have on a typical day you are drinking? 0 Filed at: 07/04/2022 1337   3a  Male UNDER 65: How often do you have five or more drinks on one occasion? 0 Filed at: 07/04/2022 1337   3b  FEMALE Any Age, or MALE 65+: How often do you have 4 or more drinks on one occassion?  0 Filed at: 07/04/2022 1337   Audit-C Score 0 Filed at: 07/04/2022 1337 FABI: How many times in the past year have you    Used an illegal drug or used a prescription medication for non-medical reasons? Never Filed at: 07/04/2022 1337                    [facility]  Number of Diagnoses or Management Options  Lumbar strain, initial encounter: new and does not require workup  Diagnosis management comments: Patient with right lower back pain after lifting a toilet, worse with movement, better with staying still  No numbness, weakness, bladder dysfunction no need for emergent MRI, most likely muscular  Advised f/u with PCP  Return precautions given  Patient Progress  Patient progress: stable      Disposition  Final diagnoses:   Lumbar strain, initial encounter     Time reflects when diagnosis was documented in both MDM as applicable and the Disposition within this note     Time User Action Codes Description Comment    7/4/2022  1:46 PM Carol Borrego Add [S39 012A] Lumbar strain, initial encounter       ED Disposition     ED Disposition   Discharge    Condition   Stable    Date/Time   Mon Jul 4, 2022  1:45 PM    Comment   Ruperto Saleh discharge to home/self care  Follow-up Information     Follow up With Specialties Details Why Contact Info    Brenda Alvarez MD Family Medicine Call in 1 week For recheck [address]  [phone]            Discharge Medication List as of 7/4/2022  1:48 PM      START taking these medications    Details   methocarbamol (ROBAXIN) 500 mg tablet Take 1 tablet (500 mg total) by mouth 3 (three) times a day, Starting Mon 7/4/2022, Normal      methylprednisolone (MEDROL) 4 mg tablet Medrol dose Pack:   Take as directed, Normal         CONTINUE these medications which have NOT CHANGED    Details   betamethasone, augmented, (DIPROLENE-AF) 0 05 % cream APPLY TOPICALLY TO THE AFFECTED AREA TWICE DAILY FOR USE ON LARGE AREAS OF BODY - CHEST, BACK AND ARMS, Historical Med      Calcium Carb-Cholecalciferol (CALCIUM 1000 + D PO) Take by mouth, Historical Med      Cholecalciferol (Vitamin D3) 1 25 MG (33974 UT) CAPS Take by mouth, Historical Med      Contour Next Test test strip daily Test blood sugar, Starting Mon 4/18/2022, Historical Med      dorzolamide (TRUSOPT) 2 % ophthalmic solution Starting Wed 4/13/2022, Historical Med      dorzolamide-timolol (COSOPT) 22 3-6 8 MG/ML ophthalmic solution INSTILL 1 DROP INTO EACH EYE TWICE DAILY, Historical Med      glipiZIDE (GLUCOTROL XL) 2 5 mg 24 hr tablet Take 2 5 mg by mouth daily, Starting Mon 5/9/2022, Historical Med      hydrochlorothiazide (HYDRODIURIL) 25 mg tablet Take 25 mg by mouth daily, Starting Mon 5/9/2022, Historical Med      lisinopril (ZESTRIL) 20 mg tablet Take 20 mg by mouth daily, Starting Mon 5/9/2022, Historical Med      lovastatin (MEVACOR) 20 mg tablet Take 20 mg by mouth daily, Starting Mon 5/9/2022, Historical Med      Microlet Lancets MISC USE AS DIRECTED FOR DAILY GLUCOSE TESTING, Historical Med      neomycin-polymyxin-hydrocortisone (CORTISPORIN) otic solution Administer 4 drops to the right ear every 8 (eight) hours, Starting Tue 6/14/2022, Normal      potassium chloride (K-DUR,KLOR-CON) 20 mEq tablet Take 20 mEq by mouth daily, Starting Mon 4/18/2022, Historical Med      timolol (TIMOPTIC) 0 5 % ophthalmic solution INSTILL 1 DROP INTO EACH EYE TWICE DAILY, Historical Med                 PDMP Review     None          ED Provider  Electronically Signed by           Pasha Rhodes PA-C  07/04/22 9645

## 2022-07-04 NOTE — DISCHARGE INSTRUCTIONS
Rest, heating pad to back  Tylenol as needed for pain  Robaxin 3 times a day  Take medrol dose pack as directed  Follow up with [facility] or family doctor for recheck  Return to ER if bowel/bladder dysfunction, numbness in groin

## 2022-07-05 ENCOUNTER — TELEPHONE (OUTPATIENT)
Dept: FAMILY MEDICINE CLINIC | Facility: HOSPITAL | Age: 77
End: 2022-07-05

## 2022-07-05 DIAGNOSIS — M54.50 LUMBOSACRAL PAIN: Primary | ICD-10-CM

## 2022-07-05 RX ORDER — LIDOCAINE 50 MG/G
1 PATCH TOPICAL DAILY
Qty: 30 PATCH | Refills: 1 | Status: SHIPPED | OUTPATIENT
Start: 2022-07-05 | End: 2022-10-17 | Stop reason: ALTCHOICE

## 2022-07-05 NOTE — TELEPHONE ENCOUNTER
Patient seen in ed yesterday for lumbar strain  He was moving a toilet on Sunday/monday - didn't feel any pain at that time, but can't think of anything else that may have happened  Patient was given a lidocaine patch, steroids, and robaxin - Which have been helping greatly      Patient asking if you can send some lidocaine 5% patches to [facility], [place] for him?    pcb

## 2022-07-06 ENCOUNTER — HOSPITAL ENCOUNTER (OUTPATIENT)
Dept: INFUSION CENTER | Facility: HOSPITAL | Age: 77
Discharge: HOME | End: 2022-07-06
Payer: MEDICARE

## 2022-07-06 ENCOUNTER — TELEPHONE (OUTPATIENT)
Dept: PHYSICAL THERAPY | Facility: OTHER | Age: 77
End: 2022-07-06

## 2022-07-06 VITALS
HEART RATE: 62 BPM | RESPIRATION RATE: 16 BRPM | TEMPERATURE: 97.9 F | OXYGEN SATURATION: 96 % | DIASTOLIC BLOOD PRESSURE: 74 MMHG | SYSTOLIC BLOOD PRESSURE: 142 MMHG

## 2022-07-06 DIAGNOSIS — C34.90 NSCLC METASTATIC TO BONE (HCC): Primary | ICD-10-CM

## 2022-07-06 DIAGNOSIS — C79.51 NSCLC METASTATIC TO BONE (HCC): Primary | ICD-10-CM

## 2022-07-06 PROCEDURE — 96413 CHEMO IV INFUSION 1 HR: CPT

## 2022-07-06 RX ORDER — SODIUM CHLORIDE 9 MG/ML
20 INJECTION, SOLUTION INTRAVENOUS ONCE
Status: COMPLETED | OUTPATIENT
Start: 2022-07-06 | End: 2022-07-06

## 2022-07-06 RX ADMIN — SODIUM CHLORIDE 200 MG: 9 INJECTION, SOLUTION INTRAVENOUS at 14:13

## 2022-07-06 RX ADMIN — SODIUM CHLORIDE 20 ML/HR: 9 INJECTION, SOLUTION INTRAVENOUS at 14:13

## 2022-07-06 NOTE — TELEPHONE ENCOUNTER
Call placed to the patient per [facility] referral     After explanation of the program the patient is refusing at this time  Patient states the back pain has improved and would like to hold off on any treatment at this time  Patient was provided with our ph# and hours of business  Referral Closed

## 2022-07-06 NOTE — PROGRESS NOTES
Pt tolerated keytruda infusion well without complication  Port de-accessed and band aid applied  Pt aware of next appts  Left ambulatory with steady gait for d/c

## 2022-07-13 ENCOUNTER — TELEPHONE (OUTPATIENT)
Dept: HEMATOLOGY ONCOLOGY | Facility: CLINIC | Age: 77
End: 2022-07-13

## 2022-07-13 NOTE — TELEPHONE ENCOUNTER
CALL RETURN FORM   Reason for patient call? Patient calling regarding a growth on his R arm  He states he has an appointment with dermatology in late August but the growth is getting worse  He would like Dr Ellen Castorena to advise on if he should come see her for this or if she is able to get him a sooner appointment with dermatology  Patient's primary oncologist? Dr Ellen Castorena   Name of person the patient was calling for? Dr Ellen Castorena   Any additional information to add, if applicable? N/A   Informed patient that the message will be forwarded to the team and someone will get back to them as soon as possible    Did you relay this information to the patient?  Yes

## 2022-07-13 NOTE — TELEPHONE ENCOUNTER
Returned call to patient  Patient explaining that the growth on his arm is getting bigger and they cannot get him into a derm office until the end of August  He is wondering if I can get him in any sooner  I told him that I will reach out to the office and see if there are any sooner appts and give him a callback  Patient verbalized understanding

## 2022-07-18 ENCOUNTER — TELEPHONE (OUTPATIENT)
Dept: HEMATOLOGY ONCOLOGY | Facility: HOSPITAL | Age: 77
End: 2022-07-18

## 2022-07-18 NOTE — TELEPHONE ENCOUNTER
Spoke with patient after receiving his [employer] message about the dermatologist  I told him that the end of August is the soonest appt they could get and he should keep his appt on 8/1 at the Dermatologist office that is not [facility] so he can be seen sooner  I also gave him my direct teams number to contact our office  Patient verbalized understanding of this

## 2022-07-19 DIAGNOSIS — E11.65 TYPE 2 DIABETES MELLITUS WITH HYPERGLYCEMIA, WITHOUT LONG-TERM CURRENT USE OF INSULIN (HCC): Primary | ICD-10-CM

## 2022-07-19 RX ORDER — LANCETS
EACH MISCELLANEOUS
Qty: 100 EACH | Refills: 3 | Status: SHIPPED | OUTPATIENT
Start: 2022-07-19 | End: 2023-09-03

## 2022-07-19 RX ORDER — PERPHENAZINE 16 MG/1
TABLET, FILM COATED ORAL
Qty: 100 STRIP | Refills: 3 | Status: SHIPPED | OUTPATIENT
Start: 2022-07-19

## 2022-07-25 ENCOUNTER — HOSPITAL ENCOUNTER (OUTPATIENT)
Dept: INFUSION CENTER | Facility: HOSPITAL | Age: 77
Discharge: HOME | End: 2022-07-25
Payer: MEDICARE

## 2022-07-25 VITALS — TEMPERATURE: 98 F

## 2022-07-25 DIAGNOSIS — C79.51 NSCLC METASTATIC TO BONE (HCC): Primary | ICD-10-CM

## 2022-07-25 DIAGNOSIS — C34.90 NSCLC METASTATIC TO BONE (HCC): Primary | ICD-10-CM

## 2022-07-25 DIAGNOSIS — E87.6 HYPOKALEMIA: ICD-10-CM

## 2022-07-25 LAB
ALBUMIN SERPL BCP-MCNC: 3.3 G/DL (ref 3.5–5)
ALP SERPL-CCNC: 93 U/L (ref 46–116)
ALT SERPL W P-5'-P-CCNC: 46 U/L (ref 12–78)
ANION GAP SERPL CALCULATED.3IONS-SCNC: 6 MMOL/L (ref 4–13)
AST SERPL W P-5'-P-CCNC: 32 U/L (ref 5–45)
BASOPHILS # BLD AUTO: 0.03 THOUSANDS/ΜL (ref 0–0.1)
BASOPHILS NFR BLD AUTO: 1 % (ref 0–1)
BILIRUB SERPL-MCNC: 0.7 MG/DL (ref 0.2–1)
BUN SERPL-MCNC: 18 MG/DL (ref 5–25)
CALCIUM ALBUM COR SERPL-MCNC: 9.4 MG/DL (ref 8.3–10.1)
CALCIUM SERPL-MCNC: 8.8 MG/DL (ref 8.3–10.1)
CHLORIDE SERPL-SCNC: 106 MMOL/L (ref 96–108)
CO2 SERPL-SCNC: 30 MMOL/L (ref 21–32)
CREAT SERPL-MCNC: 0.92 MG/DL (ref 0.6–1.3)
EOSINOPHIL # BLD AUTO: 0.17 THOUSAND/ΜL (ref 0–0.61)
EOSINOPHIL NFR BLD AUTO: 5 % (ref 0–6)
ERYTHROCYTE [DISTWIDTH] IN BLOOD BY AUTOMATED COUNT: 14.5 % (ref 11.6–15.1)
GFR SERPL CREATININE-BSD FRML MDRD: 79 ML/MIN/1.73SQ M
GLUCOSE SERPL-MCNC: 124 MG/DL (ref 65–140)
HCT VFR BLD AUTO: 44.9 % (ref 36.5–49.3)
HGB BLD-MCNC: 14.9 G/DL (ref 12–17)
IMM GRANULOCYTES # BLD AUTO: 0.02 THOUSAND/UL (ref 0–0.2)
IMM GRANULOCYTES NFR BLD AUTO: 1 % (ref 0–2)
LYMPHOCYTES # BLD AUTO: 0.39 THOUSANDS/ΜL (ref 0.6–4.47)
LYMPHOCYTES NFR BLD AUTO: 11 % (ref 14–44)
MCH RBC QN AUTO: 31.2 PG (ref 26.8–34.3)
MCHC RBC AUTO-ENTMCNC: 33.2 G/DL (ref 31.4–37.4)
MCV RBC AUTO: 94 FL (ref 82–98)
MONOCYTES # BLD AUTO: 0.5 THOUSAND/ΜL (ref 0.17–1.22)
MONOCYTES NFR BLD AUTO: 14 % (ref 4–12)
NEUTROPHILS # BLD AUTO: 2.57 THOUSANDS/ΜL (ref 1.85–7.62)
NEUTS SEG NFR BLD AUTO: 68 % (ref 43–75)
NRBC BLD AUTO-RTO: 0 /100 WBCS
PLATELET # BLD AUTO: 139 THOUSANDS/UL (ref 149–390)
PMV BLD AUTO: 10.7 FL (ref 8.9–12.7)
POTASSIUM SERPL-SCNC: 3.5 MMOL/L (ref 3.5–5.3)
PROT SERPL-MCNC: 7.1 G/DL (ref 6.4–8.4)
RBC # BLD AUTO: 4.77 MILLION/UL (ref 3.88–5.62)
SODIUM SERPL-SCNC: 142 MMOL/L (ref 135–147)
T3FREE SERPL-MCNC: 2.99 PG/ML (ref 2.3–4.2)
TSH SERPL DL<=0.05 MIU/L-ACNC: 1.55 UIU/ML (ref 0.45–4.5)
WBC # BLD AUTO: 3.68 THOUSAND/UL (ref 4.31–10.16)

## 2022-07-25 PROCEDURE — 84481 FREE ASSAY (FT-3): CPT | Performed by: INTERNAL MEDICINE

## 2022-07-25 PROCEDURE — 80053 COMPREHEN METABOLIC PANEL: CPT | Performed by: INTERNAL MEDICINE

## 2022-07-25 PROCEDURE — 84443 ASSAY THYROID STIM HORMONE: CPT | Performed by: INTERNAL MEDICINE

## 2022-07-25 PROCEDURE — 85025 COMPLETE CBC W/AUTO DIFF WBC: CPT | Performed by: INTERNAL MEDICINE

## 2022-07-25 RX ORDER — POTASSIUM CHLORIDE 20 MEQ/1
20 TABLET, EXTENDED RELEASE ORAL DAILY
Qty: 90 TABLET | Refills: 0 | Status: SHIPPED | OUTPATIENT
Start: 2022-07-25 | End: 2022-10-17

## 2022-07-25 NOTE — PLAN OF CARE
Problem: DISCHARGE PLANNING  Goal: Discharge to home or other facility with appropriate resources  Description: INTERVENTIONS:  - Identify barriers to discharge w/patient and caregiver  - Arrange for needed discharge resources and transportation as appropriate  - Identify discharge learning needs (meds, wound care, etc )  - Arrange for interpretive services to assist at discharge as needed  - Refer to Case Management Department for coordinating discharge planning if the patient needs post-hospital services based on physician/advanced practitioner order or complex needs related to functional status, cognitive ability, or social support system  Outcome: Progressing     Problem: Knowledge Deficit  Goal: Patient/family/caregiver demonstrates understanding of disease process, treatment plan, medications, and discharge instructions  Description: Complete learning assessment and assess knowledge base    Interventions:  - Provide teaching at level of understanding  - Provide teaching via preferred learning methods  Outcome: Progressing
Attending Only

## 2022-07-25 NOTE — PROGRESS NOTES
Pt had labs drawn via rcw port  Labs obtained, port de-accessed and band aid applied  Next appt scheduled  Pt left ambulatory with steady gait for d/c

## 2022-07-26 RX ORDER — SODIUM CHLORIDE 9 MG/ML
20 INJECTION, SOLUTION INTRAVENOUS ONCE
Status: CANCELLED | OUTPATIENT
Start: 2022-07-27

## 2022-07-26 RX ORDER — SODIUM CHLORIDE 9 MG/ML
20 INJECTION, SOLUTION INTRAVENOUS ONCE
Status: CANCELLED | OUTPATIENT
Start: 2022-08-17

## 2022-07-27 ENCOUNTER — OFFICE VISIT (OUTPATIENT)
Dept: HEMATOLOGY ONCOLOGY | Facility: HOSPITAL | Age: 77
End: 2022-07-27
Payer: MEDICARE

## 2022-07-27 ENCOUNTER — HOSPITAL ENCOUNTER (OUTPATIENT)
Dept: INFUSION CENTER | Facility: HOSPITAL | Age: 77
Discharge: HOME | End: 2022-07-27
Payer: MEDICARE

## 2022-07-27 ENCOUNTER — TELEPHONE (OUTPATIENT)
Dept: HEMATOLOGY ONCOLOGY | Facility: HOSPITAL | Age: 77
End: 2022-07-27

## 2022-07-27 VITALS
TEMPERATURE: 97.8 F | RESPIRATION RATE: 16 BRPM | BODY MASS INDEX: 33.24 KG/M2 | OXYGEN SATURATION: 96 % | HEIGHT: 72 IN | WEIGHT: 245.4 LBS | DIASTOLIC BLOOD PRESSURE: 74 MMHG | SYSTOLIC BLOOD PRESSURE: 134 MMHG | HEART RATE: 66 BPM

## 2022-07-27 VITALS
WEIGHT: 245 LBS | SYSTOLIC BLOOD PRESSURE: 140 MMHG | HEART RATE: 79 BPM | DIASTOLIC BLOOD PRESSURE: 78 MMHG | RESPIRATION RATE: 16 BRPM | HEIGHT: 72 IN | TEMPERATURE: 97.4 F | BODY MASS INDEX: 33.18 KG/M2 | OXYGEN SATURATION: 96 %

## 2022-07-27 DIAGNOSIS — C79.51 NSCLC METASTATIC TO BONE (HCC): Primary | ICD-10-CM

## 2022-07-27 DIAGNOSIS — C34.90 NSCLC METASTATIC TO BONE (HCC): Primary | ICD-10-CM

## 2022-07-27 PROCEDURE — 96413 CHEMO IV INFUSION 1 HR: CPT

## 2022-07-27 PROCEDURE — 99214 OFFICE O/P EST MOD 30 MIN: CPT | Performed by: INTERNAL MEDICINE

## 2022-07-27 RX ORDER — SODIUM CHLORIDE 9 MG/ML
20 INJECTION, SOLUTION INTRAVENOUS ONCE
Status: CANCELLED | OUTPATIENT
Start: 2022-09-28

## 2022-07-27 RX ORDER — SODIUM CHLORIDE 9 MG/ML
20 INJECTION, SOLUTION INTRAVENOUS ONCE
Status: CANCELLED | OUTPATIENT
Start: 2022-09-07

## 2022-07-27 RX ORDER — SODIUM CHLORIDE 9 MG/ML
20 INJECTION, SOLUTION INTRAVENOUS ONCE
Status: COMPLETED | OUTPATIENT
Start: 2022-07-27 | End: 2022-07-27

## 2022-07-27 RX ADMIN — SODIUM CHLORIDE 200 MG: 9 INJECTION, SOLUTION INTRAVENOUS at 10:33

## 2022-07-27 RX ADMIN — SODIUM CHLORIDE 20 ML/HR: 9 INJECTION, SOLUTION INTRAVENOUS at 10:33

## 2022-07-27 NOTE — PROGRESS NOTES
Pt here today for chemo tolerated well no adverse reactions AVS provided and pt left ambulatory with the use of a cane and steady gait

## 2022-07-27 NOTE — TELEPHONE ENCOUNTER
While we try to accommodate patient requests, our priority is to schedule treatment according to Doctor's orders and site availability  1  Does the Provider use the intake sheet or checkout note? No  2  What would be a preferred day of the week that would work best for your infusion appointment? Continue as same schedule  3  Do you prefer mornings or afternoons for your appointments? Continue as same schedule  4  Are there any days or dates that do not work for your schedule, including any upcoming vacations? N/A  5  We are going to try our best to schedule you at the infusion center closest to your home  In the event that we are unable to what would be your next preferred infusion site or sites? [facility]    1    2    3     6  Do you have transportation to take you to all of your appointments? Yes  7   Would you like the infusion center to draw labs from your port? (disregard if patient doesn't have a port or need labs for infusion appointment)No

## 2022-07-27 NOTE — TELEPHONE ENCOUNTER
Spoke with patient, was given appointment dates and times at his infusion this morning  Told him he can call if he has any questions  He was thankful for my call

## 2022-07-29 NOTE — PROGRESS NOTES
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, PDL-1 20%      3/2018 - 2018 - carbo/alimta x 6      2018 - 2019 - maintenance alimta      2019 - pembrolizumab every 3 weeks, RT to T11 to L1       Interval History: patient presents for follow up of his stage IV adenocarcinoma of the lung  He continues to tolerate pembro well  He denies any diarrhea  He has a growing lesion on his right forearm and is seeing dermatology next week to evaluate it  Labs prior to this visit are normal   He was in the ER on the  after lifting something heavy  He had back pain that improved with muscle relaxant and a course of steroids  REVIEW OF SYSTEMS:  Please note that a 14-point review of systems was performed to include Constitutional, HEENT, Respiratory, CVS, GI, , Musculoskeletal, Integumentary, Neurologic, Rheumatologic, Endocrinologic, Psychiatric, Lymphatic, and Hematologic/Oncologic systems were reviewed and are negative unless otherwise stated in HPI  Positive and negative findings pertinent to this evaluation are incorporated into the history of present illness  ECOG PS: 1    PROBLEM LIST:  Patient Active Problem List:    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 [facility])    Essential hypertension    Hypokalemia    Mixed hyperlipidemia    Glaucoma of both eyes    Type 2 diabetes mellitus with hyperglycemia, without long-term current use of insulin (HCC)    Colostomy in place [facility])    Dysequilibrium      Assessment / Plan: we will continue with pembro every 3 weeks and I will see him in 6 weeks  He will keep me updated about what is dermatologist say about the lesion, but I suspect he will need a biopsy   We will continue to monitor for autoimmune side effects  His wife also informed me that our phone lines were dropping their calls  I will check into this and I made sure they had an alternative number to get in touch with my nurse directly  I spent 30 minutes on chart review, face to face counseling time, coordination of care and documentation  Past Medical History:  has a past medical history of Diabetes mellitus ([facility] [address] ), High blood pressure, and High cholesterol  PAST SURGICAL HISTORY:  has a past surgical history that includes Elbow surgery (Left, 2004)  CURRENT MEDICATIONS  Current Outpatient Medications:  betamethasone, augmented, (DIPROLENE-AF) 0 05 % cream, APPLY TOPICALLY TO THE AFFECTED AREA TWICE DAILY FOR USE ON LARGE AREAS OF BODY - CHEST, BACK AND ARMS, Disp: , Rfl:   Calcium Carb-Cholecalciferol (CALCIUM 1000 + D PO), Take by mouth, Disp: , Rfl:   Cholecalciferol (Vitamin D3) 1 25 MG (58255 UT) CAPS, Take by mouth, Disp: , Rfl:   Contour Next Test test strip, Test blood sugar once daily, Disp: 100 strip, Rfl: 3  dorzolamide (TRUSOPT) 2 % ophthalmic solution, , Disp: , Rfl:   dorzolamide-timolol (COSOPT) 22 3-6 8 MG/ML ophthalmic solution, INSTILL 1 DROP INTO EACH EYE TWICE DAILY, Disp: , Rfl:   glipiZIDE (GLUCOTROL XL) 2 5 mg 24 hr tablet, Take 2 5 mg by mouth daily, Disp: , Rfl:   hydrochlorothiazide (HYDRODIURIL) 25 mg tablet, Take 25 mg by mouth daily, Disp: , Rfl:   lidocaine (LIDODERM) 5 %, Apply 1 patch topically daily Remove & Discard patch within 12 hours or as directed by MD, Disp: 30 patch, Rfl: 1  lisinopril (ZESTRIL) 20 mg tablet, Take 20 mg by mouth daily, Disp: , Rfl:   lovastatin (MEVACOR) 20 mg tablet, Take 20 mg by mouth daily, Disp: , Rfl:   methocarbamol (ROBAXIN) 500 mg tablet, Take 1 tablet (500 mg total) by mouth 3 (three) times a day, Disp: 20 tablet, Rfl: 0  methylprednisolone (MEDROL) 4 mg tablet, Medrol dose Pack:   Take as directed, Disp: 21 tablet, Rfl: 0  Microlet Lancets MISC, Use to test glucose once a day, Disp: 100 each, Rfl: 3  neomycin-polymyxin-hydrocortisone (CORTISPORIN) otic solution, Administer 4 drops to the right ear every 8 (eight) hours, Disp: 10 mL, Rfl: 0  potassium chloride (K-DUR,KLOR-CON) 20 mEq tablet, Take 1 tablet (20 mEq total) by mouth daily, Disp: 90 tablet, Rfl: 0  timolol (TIMOPTIC) 0 5 % ophthalmic solution, INSTILL 1 DROP INTO EACH EYE TWICE DAILY, Disp: , Rfl:     No current facility-administered medications for this visit  [unfilled]    SOCIAL HISTORY:  reports that he quit smoking about 16 years ago  His smoking use included cigarettes  He started smoking about 65 years ago  He has a 50 00 pack-year smoking history  He has never used smokeless tobacco  He reports current alcohol use of about 21 0 standard drinks of alcohol per week  He reports that he does not use drugs  FAMILY HISTORY:  family history includes Colon cancer in his father  ALLERGIES:  is allergic to aspirin and ibuprofen  Physical Exam:  Vital Signs:   /78 (BP Location: Right arm, Patient Position: Sitting, Cuff Size: Adult)   Pulse 79   Temp (!) 97 4 °F (36 3 °C) (Tympanic)   Resp 16   Ht 6' (1 829 m)   Wt 111 kg (245 lb)   SpO2 96%   BMI 33 23 kg/m²   Smoking Status Former Smoker   BSA 2 32 m²   Body mass index is 33 23 kg/m²  Body surface area is 2 32 meters squared  GEN: Alert, awake oriented x3, in no acute distress  HEENT- No pallor, icterus, cyanosis, no oral mucosal lesions,   LAD - no palpable cervical, clavicle, axillary, inguinal LAD  Heart- normal S1 S2, regular rate and rhythm, No murmur, rubs     Lungs- clear breathing sound bilateral    Abdomen- soft, Non tender, bowel sounds present  Extremities- No cyanosis, clubbing, edema  Neuro- No focal neurological deficit  Skin -         Labs:  Lab Results      Component                Value               Date                      WBC                      3 68 (L)            07/25/2022                HGB                      14 9 07/25/2022                HCT                      44 9                07/25/2022                MCV                      94                  07/25/2022                PLT                      139 (L)             07/25/2022            Lab Results      Component                Value               Date                      SODIUM                   142                 07/25/2022                K                        3 5                 07/25/2022                CL                       106                 07/25/2022                CO2                      30                  07/25/2022                AGAP                     6                   07/25/2022                BUN                      18                  07/25/2022                CREATININE               0 92                07/25/2022                GLUC                     124                 07/25/2022                CALCIUM                  8 8                 07/25/2022                AST                      32                  07/25/2022                ALT                      46                  07/25/2022                ALKPHOS                  93                  07/25/2022                TP                       7 1                 07/25/2022                TBILI                    0 70                07/25/2022                EGFR                     79                  07/25/2022

## 2022-08-08 DIAGNOSIS — E11.65 TYPE 2 DIABETES MELLITUS WITH HYPERGLYCEMIA, WITHOUT LONG-TERM CURRENT USE OF INSULIN (HCC): Primary | ICD-10-CM

## 2022-08-08 RX ORDER — GLIPIZIDE 2.5 MG/1
2.5 TABLET, EXTENDED RELEASE ORAL DAILY
Qty: 30 TABLET | Refills: 2 | Status: SHIPPED | OUTPATIENT
Start: 2022-08-08

## 2022-08-15 ENCOUNTER — HOSPITAL ENCOUNTER (OUTPATIENT)
Dept: INFUSION CENTER | Facility: HOSPITAL | Age: 77
Discharge: HOME | End: 2022-08-15
Payer: MEDICARE

## 2022-08-15 DIAGNOSIS — C79.51 NSCLC METASTATIC TO BONE (HCC): Primary | ICD-10-CM

## 2022-08-15 DIAGNOSIS — C34.90 NSCLC METASTATIC TO BONE (HCC): Primary | ICD-10-CM

## 2022-08-15 LAB
ALBUMIN SERPL BCP-MCNC: 3.3 G/DL (ref 3.5–5)
ALP SERPL-CCNC: 91 U/L (ref 46–116)
ALT SERPL W P-5'-P-CCNC: 30 U/L (ref 12–78)
ANION GAP SERPL CALCULATED.3IONS-SCNC: 9 MMOL/L (ref 4–13)
AST SERPL W P-5'-P-CCNC: 22 U/L (ref 5–45)
BASOPHILS # BLD AUTO: 0.03 THOUSANDS/ΜL (ref 0–0.1)
BASOPHILS NFR BLD AUTO: 1 % (ref 0–1)
BILIRUB SERPL-MCNC: 0.9 MG/DL (ref 0.2–1)
BUN SERPL-MCNC: 19 MG/DL (ref 5–25)
CALCIUM ALBUM COR SERPL-MCNC: 9.5 MG/DL (ref 8.3–10.1)
CALCIUM SERPL-MCNC: 8.9 MG/DL (ref 8.3–10.1)
CHLORIDE SERPL-SCNC: 105 MMOL/L (ref 96–108)
CO2 SERPL-SCNC: 28 MMOL/L (ref 21–32)
CREAT SERPL-MCNC: 0.92 MG/DL (ref 0.6–1.3)
EOSINOPHIL # BLD AUTO: 0.24 THOUSAND/ΜL (ref 0–0.61)
EOSINOPHIL NFR BLD AUTO: 6 % (ref 0–6)
ERYTHROCYTE [DISTWIDTH] IN BLOOD BY AUTOMATED COUNT: 14.4 % (ref 11.6–15.1)
GFR SERPL CREATININE-BSD FRML MDRD: 79 ML/MIN/1.73SQ M
GLUCOSE SERPL-MCNC: 118 MG/DL (ref 65–140)
HCT VFR BLD AUTO: 44.7 % (ref 36.5–49.3)
HGB BLD-MCNC: 14.3 G/DL (ref 12–17)
IMM GRANULOCYTES # BLD AUTO: 0.02 THOUSAND/UL (ref 0–0.2)
IMM GRANULOCYTES NFR BLD AUTO: 1 % (ref 0–2)
LYMPHOCYTES # BLD AUTO: 0.34 THOUSANDS/ΜL (ref 0.6–4.47)
LYMPHOCYTES NFR BLD AUTO: 9 % (ref 14–44)
MCH RBC QN AUTO: 30.4 PG (ref 26.8–34.3)
MCHC RBC AUTO-ENTMCNC: 32 G/DL (ref 31.4–37.4)
MCV RBC AUTO: 95 FL (ref 82–98)
MONOCYTES # BLD AUTO: 0.52 THOUSAND/ΜL (ref 0.17–1.22)
MONOCYTES NFR BLD AUTO: 13 % (ref 4–12)
NEUTROPHILS # BLD AUTO: 2.83 THOUSANDS/ΜL (ref 1.85–7.62)
NEUTS SEG NFR BLD AUTO: 70 % (ref 43–75)
NRBC BLD AUTO-RTO: 0 /100 WBCS
PLATELET # BLD AUTO: 154 THOUSANDS/UL (ref 149–390)
PMV BLD AUTO: 11.4 FL (ref 8.9–12.7)
POTASSIUM SERPL-SCNC: 3.6 MMOL/L (ref 3.5–5.3)
PROT SERPL-MCNC: 7.2 G/DL (ref 6.4–8.4)
RBC # BLD AUTO: 4.71 MILLION/UL (ref 3.88–5.62)
SODIUM SERPL-SCNC: 142 MMOL/L (ref 135–147)
T3FREE SERPL-MCNC: 3.24 PG/ML (ref 2.3–4.2)
TSH SERPL DL<=0.05 MIU/L-ACNC: 2.63 UIU/ML (ref 0.45–4.5)
WBC # BLD AUTO: 3.98 THOUSAND/UL (ref 4.31–10.16)

## 2022-08-15 PROCEDURE — 85025 COMPLETE CBC W/AUTO DIFF WBC: CPT | Performed by: INTERNAL MEDICINE

## 2022-08-15 PROCEDURE — 80053 COMPREHEN METABOLIC PANEL: CPT | Performed by: INTERNAL MEDICINE

## 2022-08-15 PROCEDURE — 84443 ASSAY THYROID STIM HORMONE: CPT | Performed by: INTERNAL MEDICINE

## 2022-08-15 PROCEDURE — 84481 FREE ASSAY (FT-3): CPT | Performed by: INTERNAL MEDICINE

## 2022-08-15 NOTE — PROGRESS NOTES
Patient here for labs which were drawn with no difficulty  Returns Wednesday for treatment  Declined AVS, left unit ambulatory with cane, steady gait

## 2022-08-17 ENCOUNTER — HOSPITAL ENCOUNTER (OUTPATIENT)
Dept: INFUSION CENTER | Facility: HOSPITAL | Age: 77
Discharge: HOME | End: 2022-08-17
Payer: MEDICARE

## 2022-08-17 VITALS
HEART RATE: 64 BPM | BODY MASS INDEX: 33.15 KG/M2 | DIASTOLIC BLOOD PRESSURE: 71 MMHG | WEIGHT: 244.71 LBS | OXYGEN SATURATION: 97 % | HEIGHT: 72 IN | SYSTOLIC BLOOD PRESSURE: 148 MMHG | TEMPERATURE: 98.1 F | RESPIRATION RATE: 16 BRPM

## 2022-08-17 DIAGNOSIS — C79.51 NSCLC METASTATIC TO BONE (HCC): Primary | ICD-10-CM

## 2022-08-17 DIAGNOSIS — C34.90 NSCLC METASTATIC TO BONE (HCC): Primary | ICD-10-CM

## 2022-08-17 PROCEDURE — 96413 CHEMO IV INFUSION 1 HR: CPT

## 2022-08-17 RX ORDER — SODIUM CHLORIDE 9 MG/ML
20 INJECTION, SOLUTION INTRAVENOUS ONCE
Status: COMPLETED | OUTPATIENT
Start: 2022-08-17 | End: 2022-08-17

## 2022-08-17 RX ADMIN — SODIUM CHLORIDE 200 MG: 9 INJECTION, SOLUTION INTRAVENOUS at 10:51

## 2022-08-17 RX ADMIN — SODIUM CHLORIDE 20 ML/HR: 9 INJECTION, SOLUTION INTRAVENOUS at 10:48

## 2022-09-02 ENCOUNTER — HOSPITAL ENCOUNTER (OUTPATIENT)
Dept: INFUSION CENTER | Facility: HOSPITAL | Age: 77
End: 2022-09-02
Payer: MEDICARE

## 2022-09-02 VITALS — TEMPERATURE: 97.7 F

## 2022-09-02 DIAGNOSIS — C34.90 NSCLC METASTATIC TO BONE (HCC): Primary | ICD-10-CM

## 2022-09-02 DIAGNOSIS — E11.65 TYPE 2 DIABETES MELLITUS WITH HYPERGLYCEMIA, WITHOUT LONG-TERM CURRENT USE OF INSULIN (HCC): ICD-10-CM

## 2022-09-02 DIAGNOSIS — E78.2 MIXED HYPERLIPIDEMIA: ICD-10-CM

## 2022-09-02 DIAGNOSIS — C79.51 NSCLC METASTATIC TO BONE (HCC): Primary | ICD-10-CM

## 2022-09-02 LAB
ALBUMIN SERPL BCP-MCNC: 3.3 G/DL (ref 3.5–5)
ALP SERPL-CCNC: 95 U/L (ref 46–116)
ALT SERPL W P-5'-P-CCNC: 37 U/L (ref 12–78)
ANION GAP SERPL CALCULATED.3IONS-SCNC: 8 MMOL/L (ref 4–13)
AST SERPL W P-5'-P-CCNC: 34 U/L (ref 5–45)
BASOPHILS # BLD AUTO: 0.04 THOUSANDS/ΜL (ref 0–0.1)
BASOPHILS NFR BLD AUTO: 1 % (ref 0–1)
BILIRUB SERPL-MCNC: 0.7 MG/DL (ref 0.2–1)
BUN SERPL-MCNC: 18 MG/DL (ref 5–25)
CALCIUM ALBUM COR SERPL-MCNC: 9.6 MG/DL (ref 8.3–10.1)
CALCIUM SERPL-MCNC: 9 MG/DL (ref 8.3–10.1)
CHLORIDE SERPL-SCNC: 106 MMOL/L (ref 96–108)
CHOLEST SERPL-MCNC: 123 MG/DL
CO2 SERPL-SCNC: 28 MMOL/L (ref 21–32)
CREAT SERPL-MCNC: 1.07 MG/DL (ref 0.6–1.3)
EOSINOPHIL # BLD AUTO: 0.51 THOUSAND/ΜL (ref 0–0.61)
EOSINOPHIL NFR BLD AUTO: 12 % (ref 0–6)
ERYTHROCYTE [DISTWIDTH] IN BLOOD BY AUTOMATED COUNT: 14.4 % (ref 11.6–15.1)
EST. AVERAGE GLUCOSE BLD GHB EST-MCNC: 123 MG/DL
GFR SERPL CREATININE-BSD FRML MDRD: 66 ML/MIN/1.73SQ M
GLUCOSE P FAST SERPL-MCNC: 121 MG/DL (ref 65–99)
GLUCOSE SERPL-MCNC: 121 MG/DL (ref 65–140)
HBA1C MFR BLD: 5.9 %
HCT VFR BLD AUTO: 45.5 % (ref 36.5–49.3)
HDLC SERPL-MCNC: 44 MG/DL
HGB BLD-MCNC: 15.2 G/DL (ref 12–17)
IMM GRANULOCYTES # BLD AUTO: 0.01 THOUSAND/UL (ref 0–0.2)
IMM GRANULOCYTES NFR BLD AUTO: 0 % (ref 0–2)
LDLC SERPL CALC-MCNC: 60 MG/DL (ref 0–100)
LYMPHOCYTES # BLD AUTO: 0.43 THOUSANDS/ΜL (ref 0.6–4.47)
LYMPHOCYTES NFR BLD AUTO: 10 % (ref 14–44)
MCH RBC QN AUTO: 31.7 PG (ref 26.8–34.3)
MCHC RBC AUTO-ENTMCNC: 33.4 G/DL (ref 31.4–37.4)
MCV RBC AUTO: 95 FL (ref 82–98)
MONOCYTES # BLD AUTO: 0.54 THOUSAND/ΜL (ref 0.17–1.22)
MONOCYTES NFR BLD AUTO: 12 % (ref 4–12)
NEUTROPHILS # BLD AUTO: 2.89 THOUSANDS/ΜL (ref 1.85–7.62)
NEUTS SEG NFR BLD AUTO: 65 % (ref 43–75)
NRBC BLD AUTO-RTO: 0 /100 WBCS
PLATELET # BLD AUTO: 147 THOUSANDS/UL (ref 149–390)
PMV BLD AUTO: 11.3 FL (ref 8.9–12.7)
POTASSIUM SERPL-SCNC: 3.7 MMOL/L (ref 3.5–5.3)
PROT SERPL-MCNC: 7.2 G/DL (ref 6.4–8.4)
RBC # BLD AUTO: 4.79 MILLION/UL (ref 3.88–5.62)
SODIUM SERPL-SCNC: 142 MMOL/L (ref 135–147)
T3FREE SERPL-MCNC: 3.03 PG/ML (ref 2.3–4.2)
TRIGL SERPL-MCNC: 94 MG/DL
TSH SERPL DL<=0.05 MIU/L-ACNC: 1.69 UIU/ML (ref 0.45–4.5)
WBC # BLD AUTO: 4.42 THOUSAND/UL (ref 4.31–10.16)

## 2022-09-02 PROCEDURE — 84481 FREE ASSAY (FT-3): CPT | Performed by: INTERNAL MEDICINE

## 2022-09-02 PROCEDURE — 80053 COMPREHEN METABOLIC PANEL: CPT | Performed by: INTERNAL MEDICINE

## 2022-09-02 PROCEDURE — 84443 ASSAY THYROID STIM HORMONE: CPT | Performed by: INTERNAL MEDICINE

## 2022-09-02 PROCEDURE — 80061 LIPID PANEL: CPT

## 2022-09-02 PROCEDURE — 83036 HEMOGLOBIN GLYCOSYLATED A1C: CPT

## 2022-09-02 PROCEDURE — 85025 COMPLETE CBC W/AUTO DIFF WBC: CPT | Performed by: INTERNAL MEDICINE

## 2022-09-02 NOTE — PROGRESS NOTES
Labs obtained through [facility] port without difficulty  Next appt scheduled  Left ambulatory with steady agit for d/c

## 2022-09-02 NOTE — PROGRESS NOTES
Hematology/Oncology Outpatient Follow- up Note  Seth Penn 1945, [id number]  2022        Chief Complaint   Patient presents with    Follow-up       HPI:  Seth Penn is a 65year old male who follows with Dr Pinky Garcia for a history of stage IV NSCLC  He was originally diagnosed in  and was started on treatment while living in [address]  He moved to this area in the Spring 2022  Per Dr Aleshia Dewey consult note dated 22: " He has been on pembro since 2019 and is in at least a partial remission  He has also been on zometa ever 6 weeks since diagnosis    His last PET scan was in dec 2021 and there was concern for new COOPER and LLL nodules that looked more like infectious or inflammatory"    Repeat PET CT on 22 showed No focal FDG activity characteristic of hypermetabolic malignancy and a slightly   decreasing in size and FDG avidity 6 mm left upper lobe lung nodule    He is receiving maintenance pembrolizumab every 3 weeks      Previous Hematologic/ Oncologic History:    Oncology History Overview Note   3/2018 - diagnosed with 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, PDL-1 20%    3/2018 - 2018 - carbo/alimta x 6    2018 - 2019 - maintenance alimta    2019 - pembrolizumab every 3 weeks, RT to T11 to L1          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 ([facility] [address] )   3/1/2018 -  Cancer Staged    Staging form: Lung, AJCC 8th Edition  - Clinical stage from 3/1/2018: Stage IVB (cTX, cN2, cM1c) - Signed by George Hare DO on 2022 Initial Diagnosis    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 ([address] )     2022 -  Chemotherapy    pembrolizumab (KEYTRUDA) IVPB, 200 mg, Intravenous, Once, 5 of 8 cycles  Administration: 200 mg (2022), 200 mg (6/15/2022), 200 mg (2022), 200 mg (2022), 200 mg (2022)         Current Hematologic/ Oncologic Treatment:    pembrolizumab every 3 weeks    ECO - Asymptomatic    Interval History:   The patient presents for routine follow up  He was last seen by Dr Iker Melgar on 7/27/22  At this visit, he was referred to dermatology for a growing lesion on his right forearm  He was seen by Dermatology on 08/01 and actually had 2 lesions shaved off  One lesion from his right forearm which pathology did demonstrate a squamous cell carcinoma, keratoacanthoma type  Left forearm also squamous cell carcinoma,  keratoacanthoma type, early  These sites are healing nicely  He is scheduled for maintenance dose of Keytruda today  Most recent blood work completed on 9/2 was reviewed and is in acceptable treatment range  TSH is normal   Overall patient states he is doing fairly well, at his baseline  He does have some generalized itching at times  Otherwise denies any significant side effects from [facility] I[facility] therapy  No nausea/vomiting or diarrhea or constipation  He does have a colostomy that functions well  He denies any cough, hemoptysis, chest pain or shortness of breath  Does have some chronic lower back pain that is stable        Cancer Staging:  Cancer Staging  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 [facility])  Staging form: Lung, AJCC 8th Edition  - Clinical stage from 3/1/2018: Stage IVB (cTX, cN2, cM1c) - Signed by Sherry Rhodes DO on 5/18/2022      Molecular Testing:         Test Results:    Imaging: No results found  Labs:   Lab Results   Component Value Date    WBC 4 42 09/02/2022    HGB 15 2 09/02/2022    HCT 45 5 09/02/2022    MCV 95 09/02/2022     (L) 09/02/2022     Lab Results   Component Value Date    K 3 7 09/02/2022     09/02/2022    CO2 28 09/02/2022    BUN 18 09/02/2022    CREATININE 1 07 09/02/2022    GLUF 121 (H) 09/02/2022    CALCIUM 9 0 09/02/2022    CORRECTEDCA 9 6 09/02/2022    AST 34 09/02/2022    ALT 37 09/02/2022    ALKPHOS 95 09/02/2022    EGFR 66 09/02/2022           Review of Systems   Skin: Positive for wound (Healing lesion on right forearm and left forearm)     All other systems reviewed and are negative  Active Problems:   Patient Active Problem List   Diagnosis    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 ([email or web address] )    Essential hypertension    Hypokalemia    Mixed hyperlipidemia    Glaucoma of both eyes    Type 2 diabetes mellitus with hyperglycemia, without long-term current use of insulin (HCC)    Colostomy in place [facility])    Dysequilibrium       Past Medical History:   Past Medical History:   Diagnosis Date    Diabetes mellitus ([email or web address] )     High blood pressure     High cholesterol        Surgical History:   Past Surgical History:   Procedure Laterality Date    ELBOW SURGERY Left 2004    pinched nerve       Family History:    Family History   Problem Relation Age of Onset    Colon cancer Father        Cancer-related family history includes Colon cancer in his father  Social History:   Social History     Socioeconomic History    Marital status: /Civil Union     Spouse name: Not on file    Number of children: Not on file    Years of education: Not on file    Highest education level: Not on file   Occupational History    Not on file   Tobacco Use    Smoking status: Former Smoker     Packs/day: 1 00     Years: 50 00     Pack years: 50 00     Types: Cigarettes     Start date: 35     Quit date:      Years since quittin 6    Smokeless tobacco: Never Used   Vaping Use    Vaping Use: Never used   Substance and Sexual Activity    Alcohol use:  Yes     Alcohol/week: 21 0 standard drinks     Types: 21 Glasses of wine per week     Comment: social drinker    Drug use: Never    Sexual activity: Not Currently     Partners: Female   Other Topics Concern    Not on file   Social History Narrative    Not on file     Social Determinants of Health     Financial Resource Strain: Not on file   Food Insecurity: Not on file   Transportation Needs: Not on file   Physical Activity: Not on file   Stress: Not on file   Social Connections: Not on file   Intimate Partner Violence: Not on file Housing Stability: Not on file       Current Medications:   Current Outpatient Medications   Medication Sig Dispense Refill    betamethasone, augmented, (DIPROLENE-AF) 0 05 % cream APPLY TOPICALLY TO THE AFFECTED AREA TWICE DAILY FOR USE ON LARGE AREAS OF BODY - CHEST, BACK AND ARMS      Calcium Carb-Cholecalciferol (CALCIUM 1000 + D PO) Take by mouth      Cholecalciferol (Vitamin D3) 1 25 MG (78995 UT) CAPS Take by mouth      Contour Next Test test strip Test blood sugar once daily 100 strip 3    dorzolamide (TRUSOPT) 2 % ophthalmic solution       dorzolamide-timolol (COSOPT) 22 3-6 8 MG/ML ophthalmic solution INSTILL 1 DROP INTO EACH EYE TWICE DAILY      glipiZIDE (GLUCOTROL XL) 2 5 mg 24 hr tablet Take 1 tablet (2 5 mg total) by mouth daily 30 tablet 2    hydrochlorothiazide (HYDRODIURIL) 25 mg tablet Take 25 mg by mouth daily      lidocaine (LIDODERM) 5 % Apply 1 patch topically daily Remove & Discard patch within 12 hours or as directed by MD 30 patch 1    lisinopril (ZESTRIL) 20 mg tablet Take 20 mg by mouth daily      lovastatin (MEVACOR) 20 mg tablet Take 20 mg by mouth daily      methylprednisolone (MEDROL) 4 mg tablet Medrol dose Pack: Take as directed 21 tablet 0    Microlet Lancets MISC Use to test glucose once a day 100 each 3    neomycin-polymyxin-hydrocortisone (CORTISPORIN) otic solution Administer 4 drops to the right ear every 8 (eight) hours 10 mL 0    potassium chloride (K-DUR,KLOR-CON) 20 mEq tablet Take 1 tablet (20 mEq total) by mouth daily 90 tablet 0    timolol (TIMOPTIC) 0 5 % ophthalmic solution INSTILL 1 DROP INTO EACH EYE TWICE DAILY      methocarbamol (ROBAXIN) 500 mg tablet Take 1 tablet (500 mg total) by mouth 3 (three) times a day 20 tablet 0     No current facility-administered medications for this visit  Allergies:    Allergies   Allergen Reactions    Aspirin Hives    Ibuprofen Hives       Physical Exam:  /70 (BP Location: Left arm, Patient Position: Sitting, Cuff Size: Adult)   Pulse 79   Temp 97 6 °F (36 4 °C) (Tympanic)   Resp 16   Ht 6' (1 829 m)   Wt 112 kg (246 lb)   SpO2 91%   BMI 33 36 kg/m²   Body surface area is 2 33 meters squared  Wt Readings from Last 3 Encounters:   09/07/22 112 kg (246 lb)   08/17/22 111 kg (244 lb 11 4 oz)   07/27/22 111 kg (245 lb 6 4 oz)           Physical Exam  Constitutional:       General: He is not in acute distress  Appearance: He is well-developed  He is not diaphoretic  HENT:      Head: Normocephalic and atraumatic  Mouth/Throat:      Pharynx: No oropharyngeal exudate  Eyes:      General: No scleral icterus  Pupils: Pupils are equal, round, and reactive to light  Cardiovascular:      Rate and Rhythm: Normal rate and regular rhythm  Heart sounds: No murmur heard  Pulmonary:      Effort: Pulmonary effort is normal  No respiratory distress  Breath sounds: Normal breath sounds  Abdominal:      General: Bowel sounds are normal  There is no distension  Palpations: Abdomen is soft  There is no mass  Tenderness: There is no abdominal tenderness  Musculoskeletal:         General: Normal range of motion  Cervical back: Normal range of motion and neck supple  Lymphadenopathy:      Cervical: No cervical adenopathy  Skin:     General: Skin is warm and dry  Findings: Bruising and lesion (Noted on right forearm and left forearm  Appear to be healing nicely) present  Neurological:      General: No focal deficit present  Mental Status: He is alert and oriented to person, place, and time  Psychiatric:         Mood and Affect: Mood normal          Behavior: Behavior normal          Thought Content: Thought content normal          Judgment: Judgment normal          Assessment / Plan:    1   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 [facility])      The patient is a very pleasant 39-year-old gentleman with a diagnosis of stage IV non-small cell lung cancer metastatic to the bone   He was started on treatment while living in [place], [place]  He moved to this area in the Spring 2022  Per Dr Sandy Reno consult note dated 5/18/22: " He has been on pembro since July 2019 and is in at least a partial remission  He has also been on zometa ever 6 weeks since diagnosis  His last PET scan was in dec 2021 and there was concern for new COOPER and LLL nodules that looked more like infectious or inflammatory"    He is receiving maintenance pembrolizumab every 3 weeks and tolerating infusions well without any significant treatment related side effects  His blood work has remained in acceptable treatment range  Repeat PET CT on 5/24/22 showed No focal FDG activity characteristic of hypermetabolic malignancy and a slightly   decreasing in size and FDG avidity 6 mm left upper lobe lung nodule  Continued short interval follow-up CT of chest in 3 months was recommended to ensure stability  I have placed orders for CT of chest today      I did spend time reviewing results of his recent dermatologic excisions of 2 skin lesions on his forearms  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  Typically, surgery/resections of lesions as he has had done is treatment of choice  His lesions are healing nicely    Patient will proceed with treatment as planned today  Will have CT of chest completed and return for a follow-up visit with Dr Betsy Jean  to review prior to his next treatment    Patient was in agreement with this plan of care  He is instructed to call at any time with questions or concerns  Goals and Barriers:  Current Goal:  Prolong Survival from stage IV lung cancer  Barriers: None  Patient's Capacity to Self Care:  Patient able to self care  Portions of the record may have been created with voice recognition software  Occasional wrong word or "sound a like" substitutions may have occurred due to the inherent limitations of voice recognition software  Read the chart carefully and recognize, using context, where substitutions have occurred

## 2022-09-07 ENCOUNTER — OFFICE VISIT (OUTPATIENT)
Dept: HEMATOLOGY ONCOLOGY | Facility: HOSPITAL | Age: 77
End: 2022-09-07
Payer: MEDICARE

## 2022-09-07 ENCOUNTER — HOSPITAL ENCOUNTER (OUTPATIENT)
Dept: INFUSION CENTER | Facility: HOSPITAL | Age: 77
Discharge: HOME | End: 2022-09-07
Payer: MEDICARE

## 2022-09-07 VITALS
HEART RATE: 79 BPM | WEIGHT: 246 LBS | TEMPERATURE: 97.6 F | OXYGEN SATURATION: 91 % | SYSTOLIC BLOOD PRESSURE: 148 MMHG | HEIGHT: 72 IN | DIASTOLIC BLOOD PRESSURE: 70 MMHG | RESPIRATION RATE: 16 BRPM | BODY MASS INDEX: 33.32 KG/M2

## 2022-09-07 VITALS
OXYGEN SATURATION: 96 % | HEART RATE: 68 BPM | RESPIRATION RATE: 18 BRPM | DIASTOLIC BLOOD PRESSURE: 65 MMHG | SYSTOLIC BLOOD PRESSURE: 135 MMHG | TEMPERATURE: 98.9 F

## 2022-09-07 DIAGNOSIS — C34.90 NSCLC METASTATIC TO BONE (HCC): Primary | ICD-10-CM

## 2022-09-07 DIAGNOSIS — C79.51 NSCLC METASTATIC TO BONE (HCC): Primary | ICD-10-CM

## 2022-09-07 PROCEDURE — 96413 CHEMO IV INFUSION 1 HR: CPT

## 2022-09-07 PROCEDURE — 99214 OFFICE O/P EST MOD 30 MIN: CPT | Performed by: NURSE PRACTITIONER

## 2022-09-07 RX ORDER — SODIUM CHLORIDE 9 MG/ML
20 INJECTION, SOLUTION INTRAVENOUS ONCE
Status: COMPLETED | OUTPATIENT
Start: 2022-09-07 | End: 2022-09-07

## 2022-09-07 RX ADMIN — SODIUM CHLORIDE 200 MG: 9 INJECTION, SOLUTION INTRAVENOUS at 11:16

## 2022-09-07 RX ADMIN — SODIUM CHLORIDE 20 ML/HR: 9 INJECTION, SOLUTION INTRAVENOUS at 11:15

## 2022-09-07 NOTE — PROGRESS NOTES
Pt tolerated chemo treatment with no adverse reaction  Pt left unit ambulatory with steady gait    Refused AVS

## 2022-09-16 ENCOUNTER — HOSPITAL ENCOUNTER (OUTPATIENT)
Dept: CT IMAGING | Facility: HOSPITAL | Age: 77
Discharge: HOME | End: 2022-09-16
Payer: MEDICARE

## 2022-09-16 DIAGNOSIS — C34.90 NSCLC METASTATIC TO BONE (HCC): ICD-10-CM

## 2022-09-16 DIAGNOSIS — C79.51 NSCLC METASTATIC TO BONE (HCC): ICD-10-CM

## 2022-09-16 PROCEDURE — 71260 CT THORAX DX C+: CPT

## 2022-09-16 RX ADMIN — IOHEXOL 85 ML: 350 INJECTION, SOLUTION INTRAVENOUS at 07:33

## 2022-09-26 ENCOUNTER — HOSPITAL ENCOUNTER (OUTPATIENT)
Dept: INFUSION CENTER | Facility: HOSPITAL | Age: 77
Discharge: HOME | End: 2022-09-26
Payer: MEDICARE

## 2022-09-26 DIAGNOSIS — C34.90 NSCLC METASTATIC TO BONE (HCC): Primary | ICD-10-CM

## 2022-09-26 DIAGNOSIS — C79.51 NSCLC METASTATIC TO BONE (HCC): Primary | ICD-10-CM

## 2022-09-26 LAB
ALBUMIN SERPL BCP-MCNC: 3.2 G/DL (ref 3.5–5)
ALP SERPL-CCNC: 103 U/L (ref 46–116)
ALT SERPL W P-5'-P-CCNC: 34 U/L (ref 12–78)
ANION GAP SERPL CALCULATED.3IONS-SCNC: 9 MMOL/L (ref 4–13)
AST SERPL W P-5'-P-CCNC: 27 U/L (ref 5–45)
BASOPHILS # BLD AUTO: 0.04 THOUSANDS/ΜL (ref 0–0.1)
BASOPHILS NFR BLD AUTO: 1 % (ref 0–1)
BILIRUB SERPL-MCNC: 0.8 MG/DL (ref 0.2–1)
BUN SERPL-MCNC: 20 MG/DL (ref 5–25)
CALCIUM ALBUM COR SERPL-MCNC: 9.2 MG/DL (ref 8.3–10.1)
CALCIUM SERPL-MCNC: 8.6 MG/DL (ref 8.3–10.1)
CHLORIDE SERPL-SCNC: 106 MMOL/L (ref 96–108)
CO2 SERPL-SCNC: 29 MMOL/L (ref 21–32)
CREAT SERPL-MCNC: 0.94 MG/DL (ref 0.6–1.3)
EOSINOPHIL # BLD AUTO: 0.64 THOUSAND/ΜL (ref 0–0.61)
EOSINOPHIL NFR BLD AUTO: 18 % (ref 0–6)
ERYTHROCYTE [DISTWIDTH] IN BLOOD BY AUTOMATED COUNT: 14.5 % (ref 11.6–15.1)
GFR SERPL CREATININE-BSD FRML MDRD: 77 ML/MIN/1.73SQ M
GLUCOSE SERPL-MCNC: 198 MG/DL (ref 65–140)
HCT VFR BLD AUTO: 44.5 % (ref 36.5–49.3)
HGB BLD-MCNC: 14.9 G/DL (ref 12–17)
IMM GRANULOCYTES # BLD AUTO: 0.01 THOUSAND/UL (ref 0–0.2)
IMM GRANULOCYTES NFR BLD AUTO: 0 % (ref 0–2)
LYMPHOCYTES # BLD AUTO: 0.4 THOUSANDS/ΜL (ref 0.6–4.47)
LYMPHOCYTES NFR BLD AUTO: 11 % (ref 14–44)
MCH RBC QN AUTO: 31.9 PG (ref 26.8–34.3)
MCHC RBC AUTO-ENTMCNC: 33.5 G/DL (ref 31.4–37.4)
MCV RBC AUTO: 95 FL (ref 82–98)
MONOCYTES # BLD AUTO: 0.32 THOUSAND/ΜL (ref 0.17–1.22)
MONOCYTES NFR BLD AUTO: 9 % (ref 4–12)
NEUTROPHILS # BLD AUTO: 2.19 THOUSANDS/ΜL (ref 1.85–7.62)
NEUTS SEG NFR BLD AUTO: 61 % (ref 43–75)
NRBC BLD AUTO-RTO: 0 /100 WBCS
PLATELET # BLD AUTO: 176 THOUSANDS/UL (ref 149–390)
PMV BLD AUTO: 11.4 FL (ref 8.9–12.7)
POTASSIUM SERPL-SCNC: 3.7 MMOL/L (ref 3.5–5.3)
PROT SERPL-MCNC: 7.2 G/DL (ref 6.4–8.4)
RBC # BLD AUTO: 4.67 MILLION/UL (ref 3.88–5.62)
SODIUM SERPL-SCNC: 144 MMOL/L (ref 135–147)
T3FREE SERPL-MCNC: 2.59 PG/ML (ref 2.3–4.2)
TSH SERPL DL<=0.05 MIU/L-ACNC: 1.24 UIU/ML (ref 0.45–4.5)
WBC # BLD AUTO: 3.6 THOUSAND/UL (ref 4.31–10.16)

## 2022-09-26 PROCEDURE — 80053 COMPREHEN METABOLIC PANEL: CPT | Performed by: INTERNAL MEDICINE

## 2022-09-26 PROCEDURE — 84481 FREE ASSAY (FT-3): CPT | Performed by: INTERNAL MEDICINE

## 2022-09-26 PROCEDURE — 84443 ASSAY THYROID STIM HORMONE: CPT | Performed by: INTERNAL MEDICINE

## 2022-09-26 PROCEDURE — 85025 COMPLETE CBC W/AUTO DIFF WBC: CPT | Performed by: INTERNAL MEDICINE

## 2022-09-26 NOTE — PROGRESS NOTES
Pt here for port labs  Accessed without difficulty  Labs drawn and sent  Pt tolerated procedure well  Pt left unit ambulatory with steady gait    Refused AVS

## 2022-09-28 ENCOUNTER — OFFICE VISIT (OUTPATIENT)
Dept: HEMATOLOGY ONCOLOGY | Facility: HOSPITAL | Age: 77
End: 2022-09-28
Payer: MEDICARE

## 2022-09-28 ENCOUNTER — TELEPHONE (OUTPATIENT)
Dept: HEMATOLOGY ONCOLOGY | Facility: HOSPITAL | Age: 77
End: 2022-09-28

## 2022-09-28 ENCOUNTER — HOSPITAL ENCOUNTER (OUTPATIENT)
Dept: INFUSION CENTER | Facility: HOSPITAL | Age: 77
Discharge: HOME | End: 2022-09-28
Payer: MEDICARE

## 2022-09-28 VITALS
HEART RATE: 64 BPM | SYSTOLIC BLOOD PRESSURE: 152 MMHG | WEIGHT: 245.15 LBS | RESPIRATION RATE: 16 BRPM | OXYGEN SATURATION: 96 % | DIASTOLIC BLOOD PRESSURE: 71 MMHG | BODY MASS INDEX: 33.2 KG/M2 | HEIGHT: 72 IN | TEMPERATURE: 97.3 F

## 2022-09-28 DIAGNOSIS — C34.90 NSCLC METASTATIC TO BONE (HCC): Primary | ICD-10-CM

## 2022-09-28 DIAGNOSIS — C79.51 NSCLC METASTATIC TO BONE (HCC): Primary | ICD-10-CM

## 2022-09-28 PROCEDURE — 99213 OFFICE O/P EST LOW 20 MIN: CPT | Performed by: INTERNAL MEDICINE

## 2022-09-28 PROCEDURE — 96413 CHEMO IV INFUSION 1 HR: CPT

## 2022-09-28 RX ORDER — SODIUM CHLORIDE 9 MG/ML
20 INJECTION, SOLUTION INTRAVENOUS ONCE
Status: COMPLETED | OUTPATIENT
Start: 2022-09-28 | End: 2022-09-28

## 2022-09-28 RX ORDER — SODIUM CHLORIDE 9 MG/ML
20 INJECTION, SOLUTION INTRAVENOUS ONCE
OUTPATIENT
Start: 2022-11-09

## 2022-09-28 RX ORDER — SODIUM CHLORIDE 9 MG/ML
20 INJECTION, SOLUTION INTRAVENOUS ONCE
Status: CANCELLED | OUTPATIENT
Start: 2022-10-19

## 2022-09-28 RX ADMIN — SODIUM CHLORIDE 200 MG: 9 INJECTION, SOLUTION INTRAVENOUS at 10:40

## 2022-09-28 RX ADMIN — SODIUM CHLORIDE 20 ML/HR: 9 INJECTION, SOLUTION INTRAVENOUS at 09:55

## 2022-09-28 NOTE — PROGRESS NOTES
Pt here for Keytruda infusion, tolerated well via port access  NSS flush done, port de-accessed, dsd applied  Disch amb with cane to home, steady gait

## 2022-09-28 NOTE — TELEPHONE ENCOUNTER
Left message notifying patient that more appointments were added on to his schedule  He can view them on his MyChart and I gave my Teams # for any questions

## 2022-09-28 NOTE — TELEPHONE ENCOUNTER
Please schedule patient's labs on Mondays, no time preference  Keytruda on Wednesdays, no time preference  Thank you!

## 2022-10-03 NOTE — PROGRESS NOTES
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, PDL-1 20%    3/2018 - 2018 - carbo/alimta x 6    2018 - 2019 - maintenance alimta    2019 - pembrolizumab every 3 weeks, RT to T11 to L1          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 ([facility] [address] )   3/1/2018 -  Cancer Staged    Staging form: Lung, AJCC 8th Edition  - Clinical stage from 3/1/2018: Stage IVB (cTX, cN2, cM1c) - Signed by Madhu Boateng DO on 2022 Initial Diagnosis    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 ([facility] [address] )     2022 -  Chemotherapy    pembrolizumab (KEYTRUDA) IVPB, 200 mg, Intravenous, Once, 7 of 13 cycles  Administration: 200 mg (2022), 200 mg (6/15/2022), 200 mg (2022), 200 mg (2022), 200 mg (2022), 200 mg (2022), 200 mg (2022)         Interval History: patient presents for follow up of his Stage IV adenocarcinoma of the lung  He is on maintenance pembro  CT scan prior to this visit shows ongoing remission  He has an occasional watery stool on pembro  It usually occurs when he eats fatty  Foods  He is currently getting over a URI  No fevers  Labs prior to this visit are normal   He has a rash that is relieved with curell anti-itch lotion  He is trying to use the steroid cream more sparingly  REVIEW OF SYSTEMS:  Please note that a 14-point review of systems was performed to include Constitutional, HEENT, Respiratory, CVS, GI, , Musculoskeletal, Integumentary, Neurologic, Rheumatologic, Endocrinologic, Psychiatric, Lymphatic, and Hematologic/Oncologic systems were reviewed and are negative unless otherwise stated in HPI   Positive and negative findings pertinent to this evaluation are incorporated into the history of present illness  ECOG PS: 0    PROBLEM LIST:  Patient Active Problem List   Diagnosis    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 ([facility] [address] )    Essential hypertension    Hypokalemia    Mixed hyperlipidemia    Glaucoma of both eyes    Type 2 diabetes mellitus with hyperglycemia, without long-term current use of insulin (HCC)    Colostomy in place [facility])    Dysequilibrium       Assessment / Plan: we will continue with pembro every 3 weeks  Patient remains in remission without significant autoimmune side effects  We have been doing visit with every other dose  he knows to call in the interim with any questions or concerns  I spent 20 minutes on chart review, face to face counseling time, coordination of care and documentation  Past Medical History:   has a past medical history of Diabetes mellitus ([facility] [address] ), High blood pressure, and High cholesterol  PAST SURGICAL HISTORY:   has a past surgical history that includes Elbow surgery (Left, 2004)      CURRENT MEDICATIONS  Current Outpatient Medications   Medication Sig Dispense Refill    betamethasone, augmented, (DIPROLENE-AF) 0 05 % cream APPLY TOPICALLY TO THE AFFECTED AREA TWICE DAILY FOR USE ON LARGE AREAS OF BODY - CHEST, BACK AND ARMS      Calcium Carb-Cholecalciferol (CALCIUM 1000 + D PO) Take by mouth      Cholecalciferol (Vitamin D3) 1 25 MG (35961 UT) CAPS Take by mouth      Contour Next Test test strip Test blood sugar once daily 100 strip 3    dorzolamide (TRUSOPT) 2 % ophthalmic solution       dorzolamide-timolol (COSOPT) 22 3-6 8 MG/ML ophthalmic solution INSTILL 1 DROP INTO EACH EYE TWICE DAILY      glipiZIDE (GLUCOTROL XL) 2 5 mg 24 hr tablet Take 1 tablet (2 5 mg total) by mouth daily 30 tablet 2    hydrochlorothiazide (HYDRODIURIL) 25 mg tablet Take 25 mg by mouth daily      lidocaine (LIDODERM) 5 % Apply 1 patch topically daily Remove & Discard patch within 12 hours or as directed by MD 30 patch 1    lisinopril (ZESTRIL) 20 mg tablet Take 20 mg by mouth daily      lovastatin (MEVACOR) 20 mg tablet Take 20 mg by mouth daily      methocarbamol (ROBAXIN) 500 mg tablet Take 1 tablet (500 mg total) by mouth 3 (three) times a day 20 tablet 0    methylprednisolone (MEDROL) 4 mg tablet Medrol dose Pack: Take as directed 21 tablet 0    Microlet Lancets MISC Use to test glucose once a day 100 each 3    neomycin-polymyxin-hydrocortisone (CORTISPORIN) otic solution Administer 4 drops to the right ear every 8 (eight) hours 10 mL 0    potassium chloride (K-DUR,KLOR-CON) 20 mEq tablet Take 1 tablet (20 mEq total) by mouth daily 90 tablet 0    timolol (TIMOPTIC) 0 5 % ophthalmic solution INSTILL 1 DROP INTO EACH EYE TWICE DAILY       No current facility-administered medications for this visit  [unfilled]    SOCIAL HISTORY:   reports that he quit smoking about 16 years ago  His smoking use included cigarettes  He started smoking about 65 years ago  He has a 50 00 pack-year smoking history  He has never used smokeless tobacco  He reports current alcohol use of about 21 0 standard drinks of alcohol per week  He reports that he does not use drugs  FAMILY HISTORY:  family history includes Colon cancer in his father  ALLERGIES:  is allergic to aspirin and ibuprofen  Physical Exam:  Vital Signs:   Visit Vitals  Smoking Status Former Smoker     There is no height or weight on file to calculate BMI  There is no height or weight on file to calculate BSA  GEN: Alert, awake oriented x3, in no acute distress  HEENT- No pallor, icterus, cyanosis, no oral mucosal lesions,   LAD - no palpable cervical, clavicle, axillary, inguinal LAD  Heart- normal S1 S2, regular rate and rhythm, No murmur, rubs     Lungs- clear breathing sound bilateral    Abdomen- soft, Non tender, bowel sounds present  Extremities- No cyanosis, clubbing, edema  Neuro- No focal neurological deficit    Labs:  Lab Results   Component Value Date    WBC 3 60 (L) 09/26/2022    HGB 14 9 09/26/2022    HCT 44 5 09/26/2022    MCV 95 09/26/2022     09/26/2022     Lab Results   Component Value Date    SODIUM 144 09/26/2022    K 3 7 09/26/2022     09/26/2022    CO2 29 09/26/2022    AGAP 9 09/26/2022    BUN 20 09/26/2022    CREATININE 0 94 09/26/2022    GLUC 198 (H) 09/26/2022    GLUF 121 (H) 09/02/2022    CALCIUM 8 6 09/26/2022    AST 27 09/26/2022    ALT 34 09/26/2022    ALKPHOS 103 09/26/2022    TP 7 2 09/26/2022    TBILI 0 80 09/26/2022    EGFR 77 09/26/2022

## 2022-10-11 ENCOUNTER — PATIENT OUTREACH (OUTPATIENT)
Dept: HEMATOLOGY ONCOLOGY | Facility: CLINIC | Age: 77
End: 2022-10-11

## 2022-10-11 NOTE — PROGRESS NOTES
Received VM from Octavio today  He stated that he received a call from someone yesterday regarding an appointment scheduled on "the 19th" and was requested to move appnt time from 12 to 2  I reviewed his schedule and noted he is scheduled on 10/19/22 at the [address] [place] [address] [address] infusion center at 12noon  I contacted the infusion center and provided them with the information  They will contact him directly to confirm any changes

## 2022-10-17 ENCOUNTER — HOSPITAL ENCOUNTER (OUTPATIENT)
Dept: INFUSION CENTER | Facility: HOSPITAL | Age: 77
Discharge: HOME | End: 2022-10-17
Payer: MEDICARE

## 2022-10-17 ENCOUNTER — TELEPHONE (OUTPATIENT)
Dept: ADMINISTRATIVE | Facility: OTHER | Age: 77
End: 2022-10-17

## 2022-10-17 ENCOUNTER — OFFICE VISIT (OUTPATIENT)
Dept: FAMILY MEDICINE CLINIC | Facility: HOSPITAL | Age: 77
End: 2022-10-17
Payer: MEDICARE

## 2022-10-17 VITALS
HEART RATE: 73 BPM | HEIGHT: 72 IN | WEIGHT: 244.4 LBS | SYSTOLIC BLOOD PRESSURE: 114 MMHG | BODY MASS INDEX: 33.1 KG/M2 | OXYGEN SATURATION: 96 % | TEMPERATURE: 97.9 F | DIASTOLIC BLOOD PRESSURE: 69 MMHG

## 2022-10-17 DIAGNOSIS — C34.90 NSCLC METASTATIC TO BONE (HCC): Primary | ICD-10-CM

## 2022-10-17 DIAGNOSIS — C79.51 NSCLC METASTATIC TO BONE (HCC): Primary | ICD-10-CM

## 2022-10-17 DIAGNOSIS — I10 ESSENTIAL HYPERTENSION: ICD-10-CM

## 2022-10-17 DIAGNOSIS — E87.6 HYPOKALEMIA: ICD-10-CM

## 2022-10-17 DIAGNOSIS — Z00.00 MEDICARE ANNUAL WELLNESS VISIT, SUBSEQUENT: Primary | ICD-10-CM

## 2022-10-17 DIAGNOSIS — C79.51 NSCLC METASTATIC TO BONE (HCC): ICD-10-CM

## 2022-10-17 DIAGNOSIS — C34.90 NSCLC METASTATIC TO BONE (HCC): ICD-10-CM

## 2022-10-17 DIAGNOSIS — E11.65 TYPE 2 DIABETES MELLITUS WITH HYPERGLYCEMIA, WITHOUT LONG-TERM CURRENT USE OF INSULIN (HCC): ICD-10-CM

## 2022-10-17 DIAGNOSIS — E78.2 MIXED HYPERLIPIDEMIA: ICD-10-CM

## 2022-10-17 DIAGNOSIS — Z23 ENCOUNTER FOR IMMUNIZATION: ICD-10-CM

## 2022-10-17 LAB
ALBUMIN SERPL BCP-MCNC: 3.4 G/DL (ref 3.5–5)
ALP SERPL-CCNC: 96 U/L (ref 46–116)
ALT SERPL W P-5'-P-CCNC: 40 U/L (ref 12–78)
ANION GAP SERPL CALCULATED.3IONS-SCNC: 6 MMOL/L (ref 4–13)
AST SERPL W P-5'-P-CCNC: 33 U/L (ref 5–45)
BASOPHILS # BLD AUTO: 0.05 THOUSANDS/ΜL (ref 0–0.1)
BASOPHILS NFR BLD AUTO: 1 % (ref 0–1)
BILIRUB SERPL-MCNC: 0.6 MG/DL (ref 0.2–1)
BUN SERPL-MCNC: 18 MG/DL (ref 5–25)
CALCIUM ALBUM COR SERPL-MCNC: 9.5 MG/DL (ref 8.3–10.1)
CALCIUM SERPL-MCNC: 9 MG/DL (ref 8.3–10.1)
CHLORIDE SERPL-SCNC: 105 MMOL/L (ref 96–108)
CO2 SERPL-SCNC: 30 MMOL/L (ref 21–32)
CREAT SERPL-MCNC: 0.96 MG/DL (ref 0.6–1.3)
EOSINOPHIL # BLD AUTO: 0.4 THOUSAND/ΜL (ref 0–0.61)
EOSINOPHIL NFR BLD AUTO: 9 % (ref 0–6)
ERYTHROCYTE [DISTWIDTH] IN BLOOD BY AUTOMATED COUNT: 14.3 % (ref 11.6–15.1)
GFR SERPL CREATININE-BSD FRML MDRD: 75 ML/MIN/1.73SQ M
GLUCOSE SERPL-MCNC: 129 MG/DL (ref 65–140)
HCT VFR BLD AUTO: 45.3 % (ref 36.5–49.3)
HGB BLD-MCNC: 14.8 G/DL (ref 12–17)
IMM GRANULOCYTES # BLD AUTO: 0.01 THOUSAND/UL (ref 0–0.2)
IMM GRANULOCYTES NFR BLD AUTO: 0 % (ref 0–2)
LYMPHOCYTES # BLD AUTO: 0.47 THOUSANDS/ΜL (ref 0.6–4.47)
LYMPHOCYTES NFR BLD AUTO: 10 % (ref 14–44)
MCH RBC QN AUTO: 31 PG (ref 26.8–34.3)
MCHC RBC AUTO-ENTMCNC: 32.7 G/DL (ref 31.4–37.4)
MCV RBC AUTO: 95 FL (ref 82–98)
MONOCYTES # BLD AUTO: 0.48 THOUSAND/ΜL (ref 0.17–1.22)
MONOCYTES NFR BLD AUTO: 10 % (ref 4–12)
NEUTROPHILS # BLD AUTO: 3.29 THOUSANDS/ΜL (ref 1.85–7.62)
NEUTS SEG NFR BLD AUTO: 70 % (ref 43–75)
NRBC BLD AUTO-RTO: 0 /100 WBCS
PLATELET # BLD AUTO: 162 THOUSANDS/UL (ref 149–390)
PMV BLD AUTO: 11.2 FL (ref 8.9–12.7)
POTASSIUM SERPL-SCNC: 3.6 MMOL/L (ref 3.5–5.3)
PROT SERPL-MCNC: 7.3 G/DL (ref 6.4–8.4)
RBC # BLD AUTO: 4.78 MILLION/UL (ref 3.88–5.62)
SODIUM SERPL-SCNC: 141 MMOL/L (ref 135–147)
T3FREE SERPL-MCNC: 2.92 PG/ML (ref 2.3–4.2)
TSH SERPL DL<=0.05 MIU/L-ACNC: 1.67 UIU/ML (ref 0.45–4.5)
WBC # BLD AUTO: 4.7 THOUSAND/UL (ref 4.31–10.16)

## 2022-10-17 PROCEDURE — G0008 ADMIN INFLUENZA VIRUS VAC: HCPCS

## 2022-10-17 PROCEDURE — 80053 COMPREHEN METABOLIC PANEL: CPT | Performed by: INTERNAL MEDICINE

## 2022-10-17 PROCEDURE — 99214 OFFICE O/P EST MOD 30 MIN: CPT | Performed by: FAMILY MEDICINE

## 2022-10-17 PROCEDURE — 85025 COMPLETE CBC W/AUTO DIFF WBC: CPT | Performed by: INTERNAL MEDICINE

## 2022-10-17 PROCEDURE — G0439 PPPS, SUBSEQ VISIT: HCPCS | Performed by: FAMILY MEDICINE

## 2022-10-17 PROCEDURE — 90662 IIV NO PRSV INCREASED AG IM: CPT

## 2022-10-17 PROCEDURE — 84481 FREE ASSAY (FT-3): CPT | Performed by: INTERNAL MEDICINE

## 2022-10-17 PROCEDURE — 84443 ASSAY THYROID STIM HORMONE: CPT | Performed by: INTERNAL MEDICINE

## 2022-10-17 RX ORDER — POTASSIUM CHLORIDE 20 MEQ/1
TABLET, EXTENDED RELEASE ORAL
Qty: 90 TABLET | Refills: 0 | Status: SHIPPED | OUTPATIENT
Start: 2022-10-17

## 2022-10-17 NOTE — LETTER
Diabetic Eye Exam Form    Date Requested: 10/17/22  Patient: Cat Membreno  Patient : 1945   Referring Provider: Guilherme Hines MD    DIABETIC Eye Exam Date _______________________________    Type of Exam MUST be documented for Diabetic Eye Exams  Please CHECK ONE  Retinal Exam       Dilated Retinal Exam       OCT       Optomap-Iris Exam      Fundus Photography     Left Eye - Please check Retinopathy AND Type or No Retinopathy      Exam did show retinopathy    Exam did not show retinopathy         Mild     Proliferative           Moderate    Severe            None         Right Eye - Please check Retinopathy AND Type or No Retinopathy     Exam did show retinopathy    Exam did not show retinopathy         Mild     Proliferative        Moderate    Severe        None       Comments __________________________________________________________    Practice Providing Exam ______________________________________________    Exam Performed By (print name) _______________________________________      Provider Signature ___________________________________________________    These reports are needed for  compliance  Please fax this completed form and a copy of the Diabetic Eye Exam report to our office located at [place] [address] as soon as possible via [phone] attention iPyush Hernandez: Phone [phone]  We thank you for your assistance in treating our mutual patient

## 2022-10-17 NOTE — PROGRESS NOTES
Assessment and Plan:     Problem List Items Addressed This Visit        Endocrine    Type 2 diabetes mellitus with hyperglycemia, without long-term current use of insulin (HCC)     Excellent stability  Lab Results   Component Value Date    HGBA1C 5 9 (H) 09/02/2022            Relevant Orders    Comprehensive metabolic panel    HEMOGLOBIN A1C W/ EAG ESTIMATION       Respiratory    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 [facility])       Cardiovascular and Mediastinum    Essential hypertension       Other    Mixed hyperlipidemia    Relevant Orders    Lipid Panel with Direct LDL reflex    Medicare annual wellness visit, subsequent - Primary      Other Visit Diagnoses     Encounter for immunization        Relevant Orders    influenza vaccine, high-dose, PF 0 7 mL (FLUZONE HIGH-DOSE) (Completed)        BMI Counseling: Body mass index is 33 15 kg/m²  The BMI is above normal  Nutrition recommendations include decreasing portion sizes, limiting drinks that contain sugar, moderation in carbohydrate intake and reducing intake of cholesterol  Exercise recommendations include exercising 3-5 times per week  No pharmacotherapy was ordered  Rationale for BMI follow-up plan is due to patient being overweight or obese  Depression Screening and Follow-up Plan: Patient was screened for depression during today's encounter  They screened negative with a PHQ-2 score of 0  Preventive health issues were discussed with patient, and age appropriate screening tests were ordered as noted in patient's After Visit Summary  Personalized health advice and appropriate referrals for health education or preventive services given if needed, as noted in patient's After Visit Summary  History of Present Illness:     Patient presents for a Medicare Wellness Visit    6 month follow up    Feeling well overall  Using cane for his balance gait difficulty  No falls  Requesting handicapped   Had a head cold a few weeks ago, now resolved      Glucometers are stable control  Last A1c 5 9     Patient Care Team:  Tamy Garcia MD as PCP - General (Family Medicine)     Review of Systems:     Review of Systems   Constitutional: Negative for unexpected weight change  HENT: Negative  Respiratory: Negative  Gastrointestinal: Negative  Genitourinary: Negative  Musculoskeletal: Negative  Hematological: Negative  Psychiatric/Behavioral: Negative  All other systems reviewed and are negative  Problem List:     Patient Active Problem List   Diagnosis   • 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 [facility])   • Essential hypertension   • Hypokalemia   • Mixed hyperlipidemia   • Glaucoma of both eyes   • Type 2 diabetes mellitus with hyperglycemia, without long-term current use of insulin (HCC)   • Colostomy in place [facility])   • Dysequilibrium   • Medicare annual wellness visit, subsequent      Past Medical and Surgical History:     Past Medical History:   Diagnosis Date   • Diabetes mellitus ([facility] [address] )    • High blood pressure    • High cholesterol      Past Surgical History:   Procedure Laterality Date   • ELBOW SURGERY Left 2004    pinched nerve      Family History:     Family History   Problem Relation Age of Onset   • Colon cancer Father       Social History:     Social History     Socioeconomic History   • Marital status: /Civil Union     Spouse name: None   • Number of children: None   • Years of education: None   • Highest education level: None   Occupational History   • None   Tobacco Use   • Smoking status: Former Smoker     Packs/day: 1 00     Years: 50 00     Pack years: 50 00     Types: Cigarettes     Start date: 35     Quit date:      Years since quittin 8   • Smokeless tobacco: Never Used   Vaping Use   • Vaping Use: Never used   Substance and Sexual Activity   • Alcohol use:  Yes     Alcohol/week: 21 0 standard drinks     Types: 21 Glasses of wine per week     Comment: social drinker   • Drug use: Never   • Sexual activity: Not Currently     Partners: Female   Other Topics Concern   • None   Social History Narrative   • None     Social Determinants of Health     Financial Resource Strain: Low Risk    • Difficulty of Paying Living Expenses: Not hard at all   Food Insecurity: Not on file   Transportation Needs: No Transportation Needs   • Lack of Transportation (Medical): No   • Lack of Transportation (Non-Medical): No   Physical Activity: Not on file   Stress: Not on file   Social Connections: Not on file   Intimate Partner Violence: Not on file   Housing Stability: Not on file      Medications and Allergies:     Current Outpatient Medications   Medication Sig Dispense Refill   • Calcium Carb-Cholecalciferol (CALCIUM 1000 + D PO) Take by mouth     • Cholecalciferol (Vitamin D3) 1 25 MG (25326 UT) CAPS Take by mouth     • Contour Next Test test strip Test blood sugar once daily 100 strip 3   • dorzolamide (TRUSOPT) 2 % ophthalmic solution      • dorzolamide-timolol (COSOPT) 22 3-6 8 MG/ML ophthalmic solution INSTILL 1 DROP INTO EACH EYE TWICE DAILY     • glipiZIDE (GLUCOTROL XL) 2 5 mg 24 hr tablet Take 1 tablet (2 5 mg total) by mouth daily 30 tablet 2   • hydrochlorothiazide (HYDRODIURIL) 25 mg tablet Take 25 mg by mouth daily     • lisinopril (ZESTRIL) 20 mg tablet Take 20 mg by mouth daily     • lovastatin (MEVACOR) 20 mg tablet Take 20 mg by mouth daily     • methocarbamol (ROBAXIN) 500 mg tablet Take 1 tablet (500 mg total) by mouth 3 (three) times a day 20 tablet 0   • Microlet Lancets MISC Use to test glucose once a day 100 each 3   • neomycin-polymyxin-hydrocortisone (CORTISPORIN) otic solution Administer 4 drops to the right ear every 8 (eight) hours 10 mL 0   • potassium chloride (K-DUR,KLOR-CON) 20 mEq tablet Take 1 tablet by mouth once daily 90 tablet 0     No current facility-administered medications for this visit       Allergies   Allergen Reactions   • Aspirin Hives   • Ibuprofen Hives      Immunizations:     Immunization History   Administered Date(s) Administered   • COVID-19 MODERNA VACC 0 5 ML IM 01/30/2021, 02/27/2021, 01/04/2022   • Influenza, high dose seasonal 0 7 mL 10/17/2022      Health Maintenance:         Topic Date Due   • Hepatitis C Screening  Never done         Topic Date Due   • Pneumococcal Vaccine: 65+ Years (1 - PCV) Never done   • COVID-19 Vaccine (4 - Booster for Lars Pheasant series) 04/04/2022      Medicare Screening Tests and Risk Assessments:     Linsey Rosado is here for his Subsequent Wellness visit  Health Risk Assessment:   Patient rates overall health as good  Patient feels that their physical health rating is slightly better  Patient is very satisfied with their life  Eyesight was rated as same  Hearing was rated as same  Patient feels that their emotional and mental health rating is same  Patients states they are sometimes angry  Patient states they are never, rarely unusually tired/fatigued  Pain experienced in the last 7 days has been none  Patient states that he has experienced no weight loss or gain in last 6 months  Depression Screening:   PHQ-2 Score: 0      Fall Risk Screening: In the past year, patient has experienced: no history of falling in past year      Home Safety:  Patient does not have trouble with stairs inside or outside of their home  Patient has working smoke alarms and has working carbon monoxide detector  Home safety hazards include: none  Nutrition:   Current diet is Regular  Medications:   Patient is not currently taking any over-the-counter supplements  Patient is able to manage medications  Activities of Daily Living (ADLs)/Instrumental Activities of Daily Living (IADLs):   Walk and transfer into and out of bed and chair?: Yes  Dress and groom yourself?: Yes    Bathe or shower yourself?: Yes    Feed yourself?  Yes  Do your laundry/housekeeping?: Yes  Manage your money, pay your bills and track your expenses?: Yes  Make your own meals?: Yes    Do your own shopping?: Yes    Previous Hospitalizations:   Any hospitalizations or ED visits within the last 12 months?: No      Advance Care Planning:   Living will: No    Durable POA for healthcare: No    Advanced directive: No    Advanced directive counseling given: Yes    Five wishes given: Yes    Patient declined ACP directive: No    End of Life Decisions reviewed with patient: Yes    Provider agrees with end of life decisions: Yes      Cognitive Screening:   Provider or family/friend/caregiver concerned regarding cognition?: No    PREVENTIVE SCREENINGS      Cardiovascular Screening:    General: Screening Not Indicated and History Lipid Disorder      Diabetes Screening:     General: Screening Not Indicated and History Diabetes      Colorectal Cancer Screening:     General: Risks and Benefits Discussed      Prostate Cancer Screening:    General: Screening Not Indicated      Osteoporosis Screening:    General: Screening Not Indicated      Abdominal Aortic Aneurysm (AAA) Screening:    Risk factors include: tobacco use        General: Screening Not Indicated      Lung Cancer Screening:     General: Screening Not Indicated and History Lung Cancer      Hepatitis C Screening:    General: Risks and Benefits Discussed    Hep C Screening Accepted: Yes      Screening, Brief Intervention, and Referral to Treatment (SBIRT)    Screening  Typical number of drinks in a day: 0  Typical number of drinks in a week: 0  Interpretation: Low risk drinking behavior      Single Item Drug Screening:  How often have you used an illegal drug (including marijuana) or a prescription medication for non-medical reasons in the past year? never    Single Item Drug Screen Score: 0  Interpretation: Negative screen for possible drug use disorder     Visual Acuity Screening    Right eye Left eye Both eyes   Without correction:      With correction:  20/25 20/20        Physical Exam:     /69 (BP Location: Left arm, Patient Position: Sitting, Cuff Size: Large)   Pulse 73   Temp 97 9 °F (36 6 °C) (Tympanic)   Ht 6' (1 829 m)   Wt 111 kg (244 lb 6 4 oz)   SpO2 96%   BMI 33 15 kg/m²     Physical Exam  Vitals and nursing note reviewed  Constitutional:       Appearance: Normal appearance  Neck:      Vascular: No carotid bruit  Cardiovascular:      Rate and Rhythm: Normal rate and regular rhythm  Pulses: no weak pulses          Dorsalis pedis pulses are 2+ on the right side and 2+ on the left side  Posterior tibial pulses are 2+ on the right side and 2+ on the left side  Heart sounds: Normal heart sounds  Pulmonary:      Breath sounds: Normal breath sounds  Musculoskeletal:      Right lower leg: No edema  Left lower leg: No edema  Feet:      Right foot:      Skin integrity: No ulcer, skin breakdown, erythema, warmth, callus or dry skin  Left foot:      Skin integrity: No ulcer, skin breakdown, erythema, warmth, callus or dry skin  Neurological:      Mental Status: He is alert and oriented to person, place, and time  Psychiatric:         Mood and Affect: Mood normal           Patient's shoes and socks removed  Right Foot/Ankle   Right Foot Inspection  Skin Exam: skin normal and skin intact  No dry skin, no warmth, no callus, no erythema, no maceration, no abnormal color, no pre-ulcer, no ulcer and no callus  Toe Exam: ROM and strength within normal limits  Sensory   Vibration: intact  Proprioception: intact  Monofilament testing: intact    Vascular  Capillary refills: < 3 seconds  The right DP pulse is 2+  The right PT pulse is 2+  Left Foot/Ankle  Left Foot Inspection  Skin Exam: skin normal and skin intact  No dry skin, no warmth, no erythema, no maceration, normal color, no pre-ulcer, no ulcer and no callus  Toe Exam: ROM and strength within normal limits  Sensory   Vibration: intact  Proprioception: intact  Monofilament testing: intact    Vascular  Capillary refills: < 3 seconds  The left DP pulse is 2+   The left PT pulse is 2+      Assign Risk Category  No deformity present  No loss of protective sensation  No weak pulses  Risk: 0      Rina Nash MD

## 2022-10-17 NOTE — PROGRESS NOTES
Patient had labs drawn with no difficulty, returns Wednesday for treatment  Dressing CD&I, declined AVS, left unit ambulatory with steady gait

## 2022-10-17 NOTE — PATIENT INSTRUCTIONS
Medicare Preventive Visit Patient Instructions  Thank you for completing your Welcome to Medicare Visit or Medicare Annual Wellness Visit today  Your next wellness visit will be due in one year (10/18/2023)  The screening/preventive services that you may require over the next 5-10 years are detailed below  Some tests may not apply to you based off risk factors and/or age  Screening tests ordered at today's visit but not completed yet may show as past due  Also, please note that scanned in results may not display below  Preventive Screenings:  Service Recommendations Previous Testing/Comments   Colorectal Cancer Screening  · Colonoscopy    · Fecal Occult Blood Test (FOBT)/Fecal Immunochemical Test (FIT)  · Fecal DNA/Cologuard Test  · Flexible Sigmoidoscopy Age: 39-70 years old   Colonoscopy: every 10 years (May be performed more frequently if at higher risk)  OR  FOBT/FIT: every 1 year  OR  Cologuard: every 3 years  OR  Sigmoidoscopy: every 5 years  Screening may be recommended earlier than age 39 if at higher risk for colorectal cancer  Also, an individualized decision between you and your healthcare provider will decide whether screening between the ages of 74-80 would be appropriate   Colonoscopy: Not on file  FOBT/FIT: Not on file  Cologuard: Not on file  Sigmoidoscopy: Not on file          Prostate Cancer Screening Individualized decision between patient and health care provider in men between ages of 53-78   Medicare will cover every 12 months beginning on the day after your 50th birthday PSA: No results in last 5 years     Screening Not Indicated     Hepatitis C Screening Once for adults born between 1945 and 1965  More frequently in patients at high risk for Hepatitis C Hep C Antibody: Not on file        Diabetes Screening 1-2 times per year if you're at risk for diabetes or have pre-diabetes Fasting glucose: 121 mg/dL (9/2/2022)  A1C: 5 9 % (9/2/2022)  Screening Not Indicated  History Diabetes Cholesterol Screening Once every 5 years if you don't have a lipid disorder  May order more often based on risk factors  Lipid panel: 09/02/2022  Screening Not Indicated  History Lipid Disorder      Other Preventive Screenings Covered by Medicare:  1  Abdominal Aortic Aneurysm (AAA) Screening: covered once if your at risk  You're considered to be at risk if you have a family history of AAA or a male between the age of 73-68 who smoking at least 100 cigarettes in your lifetime  2  Lung Cancer Screening: covers low dose CT scan once per year if you meet all of the following conditions: (1) Age 50-69; (2) No signs or symptoms of lung cancer; (3) Current smoker or have quit smoking within the last 15 years; (4) You have a tobacco smoking history of at least 20 pack years (packs per day x number of years you smoked); (5) You get a written order from a healthcare provider  3  Glaucoma Screening: covered annually if you're considered high risk: (1) You have diabetes OR (2) Family history of glaucoma OR (3)  aged 48 and older OR (3)  American aged 72 and older  3  Osteoporosis Screening: covered every 2 years if you meet one of the following conditions: (1) Have a vertebral abnormality; (2) On glucocorticoid therapy for more than 3 months; (3) Have primary hyperparathyroidism; (4) On osteoporosis medications and need to assess response to drug therapy  5  HIV Screening: covered annually if you're between the age of 12-76  Also covered annually if you are younger than 13 and older than 72 with risk factors for HIV infection  For pregnant patients, it is covered up to 3 times per pregnancy      Immunizations:  Immunization Recommendations   Influenza Vaccine Annual influenza vaccination during flu season is recommended for all persons aged >= 6 months who do not have contraindications   Pneumococcal Vaccine   * Pneumococcal conjugate vaccine = PCV13 (Prevnar 13), PCV15 (Vaxneuvance), PCV20 (Prevnar 20)  * Pneumococcal polysaccharide vaccine = PPSV23 (Pneumovax) Adults 2364 years old: 1-3 doses may be recommended based on certain risk factors  Adults 72 years old: 1-2 doses may be recommended based off what pneumonia vaccine you previously received   Hepatitis B Vaccine 3 dose series if at intermediate or high risk (ex: diabetes, end stage renal disease, liver disease)   Tetanus (Td) Vaccine - COST NOT COVERED BY MEDICARE PART B Following completion of primary series, a booster dose should be given every 10 years to maintain immunity against tetanus  Td may also be given as tetanus wound prophylaxis  Tdap Vaccine - COST NOT COVERED BY MEDICARE PART B Recommended at least once for all adults  For pregnant patients, recommended with each pregnancy  Shingles Vaccine (Shingrix) - COST NOT COVERED BY MEDICARE PART B  2 shot series recommended in those aged 48 and above     Health Maintenance Due:      Topic Date Due   • Hepatitis C Screening  Never done     Immunizations Due:      Topic Date Due   • Pneumococcal Vaccine: 65+ Years (1 - PCV) Never done   • COVID-19 Vaccine (4 - Booster for Moderna series) 04/04/2022   • Influenza Vaccine (1) Never done     Advance Directives   What are advance directives? Advance directives are legal documents that state your wishes and plans for medical care  These plans are made ahead of time in case you lose your ability to make decisions for yourself  Advance directives can apply to any medical decision, such as the treatments you want, and if you want to donate organs  What are the types of advance directives? There are many types of advance directives, and each state has rules about how to use them  You may choose a combination of any of the following:  · Living will: This is a written record of the treatment you want  You can also choose which treatments you do not want, which to limit, and which to stop at a certain time   This includes surgery, medicine, IV fluid, and tube feedings  · Durable power of  for healthcare [facility]): This is a written record that states who you want to make healthcare choices for you when you are unable to make them for yourself  This person, called a proxy, is usually a family member or a friend  You may choose more than 1 proxy  · Do not resuscitate (DNR) order:  A DNR order is used in case your heart stops beating or you stop breathing  It is a request not to have certain forms of treatment, such as CPR  A DNR order may be included in other types of advance directives  · Medical directive: This covers the care that you want if you are in a coma, near death, or unable to make decisions for yourself  You can list the treatments you want for each condition  Treatment may include pain medicine, surgery, blood transfusions, dialysis, IV or tube feedings, and a ventilator (breathing machine)  · Values history: This document has questions about your views, beliefs, and how you feel and think about life  This information can help others choose the care that you would choose  Why are advance directives important? An advance directive helps you control your care  Although spoken wishes may be used, it is better to have your wishes written down  Spoken wishes can be misunderstood, or not followed  Treatments may be given even if you do not want them  An advance directive may make it easier for your family to make difficult choices about your care  Weight Management   Why it is important to manage your weight:  Being overweight increases your risk of health conditions such as heart disease, high blood pressure, type 2 diabetes, and certain types of cancer  It can also increase your risk for osteoarthritis, sleep apnea, and other respiratory problems  Aim for a slow, steady weight loss  Even a small amount of weight loss can lower your risk of health problems    How to lose weight safely:  A safe and healthy way to lose weight is to eat fewer calories and get regular exercise  You can lose up about 1 pound a week by decreasing the number of calories you eat by 500 calories each day  Healthy meal plan for weight management:  A healthy meal plan includes a variety of foods, contains fewer calories, and helps you stay healthy  A healthy meal plan includes the following:  · Eat whole-grain foods more often  A healthy meal plan should contain fiber  Fiber is the part of grains, fruits, and vegetables that is not broken down by your body  Whole-grain foods are healthy and provide extra fiber in your diet  Some examples of whole-grain foods are whole-wheat breads and pastas, oatmeal, brown rice, and bulgur  · Eat a variety of vegetables every day  Include dark, leafy greens such as spinach, kale, ortiz greens, and mustard greens  Eat yellow and orange vegetables such as carrots, sweet potatoes, and winter squash  · Eat a variety of fruits every day  Choose fresh or canned fruit (canned in its own juice or light syrup) instead of juice  Fruit juice has very little or no fiber  · Eat low-fat dairy foods  Drink fat-free (skim) milk or 1% milk  Eat fat-free yogurt and low-fat cottage cheese  Try low-fat cheeses such as mozzarella and other reduced-fat cheeses  · Choose meat and other protein foods that are low in fat  Choose beans or other legumes such as split peas or lentils  Choose fish, skinless poultry (chicken or turkey), or lean cuts of red meat (beef or pork)  Before you cook meat or poultry, cut off any visible fat  · Use less fat and oil  Try baking foods instead of frying them  Add less fat, such as margarine, sour cream, regular salad dressing and mayonnaise to foods  Eat fewer high-fat foods  Some examples of high-fat foods include french fries, doughnuts, ice cream, and cakes  · Eat fewer sweets  Limit foods and drinks that are high in sugar  This includes candy, cookies, regular soda, and sweetened drinks    Exercise: Exercise at least 30 minutes per day on most days of the week  Some examples of exercise include walking, biking, dancing, and swimming  You can also fit in more physical activity by taking the stairs instead of the elevator or parking farther away from stores  Ask your healthcare provider about the best exercise plan for you  © Copyright [employer] 2018 Information is for End User's use only and may not be sold, redistributed or otherwise used for commercial purposes  All illustrations and images included in CareNotes® are the copyrighted property of [employer] M [employer]  or [facility] Keshav Gonzalez  What to Do if Your Blood Sugar is Low   AMBULATORY CARE:   Low blood sugar levels  (hypoglycemia) can happen with Type 1 and Type 2 diabetes  Low levels are more likely to happen if you use insulin  Hypoglycemia can cause you to have falls, accidents, and injuries  A blood sugar level that gets too low can lead to seizures, coma, and death  Learn to recognize the symptoms early so you can get treatment quickly  When your blood sugar is low you may feel:  · Sweaty    · Nervous or shaky    · Anxious or irritable    · Confused    · A fast, pounding heartbeat    · Extremely hungry    Have someone call your local emergency number (911 in the [address]) if:   · You cannot be woken  · You have a seizure  Call your doctor if:   · You have symptoms of a low blood sugar level, such as trouble thinking, sweating, or a pounding heartbeat  · Your blood sugar level is lower than normal and it does not improve with treatment  · You often have lower blood sugar levels than your target goals  · You have trouble coping with your illness, or you feel anxious or depressed  · You have questions or concerns about your condition or care  What to do if you have symptoms of low blood sugar:   · Check your blood sugar level, if possible  Your blood sugar level is too low if it is at or below 70 mg/dL       · Eat or drink 15 grams of fast-acting carbohydrate  Fast-acting carbohydrates will raise your blood sugar level quickly  Examples of 15 grams of fast-acting carbohydrates:     ? 4 ounces (½ cup) of fruit juice     ? 4 ounces of regular soda    ? 2 tablespoons of raisins     ? 1 tube of glucose gel or 3 to 4 glucose tablets       · Check your blood sugar level 15 minutes later  If the level is still low (less than 100 mg/dL), eat another 15 grams of carbohydrate  When the level returns to 100 mg/dL, eat a snack or meal that contains carbohydrates  This will help prevent another drop in blood sugar  · Teach people close to you how to use your glucagon kit  Your blood sugar may be too low for you to be awake  People need to know when and how to use your kit  Prevent low blood sugar levels:  Prevent low blood sugar by knowing what increases your risk  Ask your healthcare provider for ways to prevent low blood sugar levels  Any of the following can increase your risk of low blood sugar:  · Fasting for tests or procedures    · During or after intense exercise    · Late or postponed meals    · Sleeping (you may need a bedtime snack)     · Drinking alcohol if you use insulin or insulin releasing pills    Follow up with your doctor as directed:  Write down your questions so you remember to ask them during your visits  © Copyright [employer] 2022 Information is for End User's use only and may not be sold, redistributed or otherwise used for commercial purposes  All illustrations and images included in CareNotes® are the copyrighted property of A D A M [employer]  or Bradly Ballesteros   The above information is an  only  It is not intended as medical advice for individual conditions or treatments  Talk to your doctor, nurse or pharmacist before following any medical regimen to see if it is safe and effective for you  Patient/Caregiver provided printed discharge information.

## 2022-10-17 NOTE — LETTER
Diabetic Eye Exam Form    Date Requested: 10/24/22  Patient: Lisa Acevedo  Patient : 1945   Referring Provider: Shahab Myers MD    DIABETIC Eye Exam Date _______________________________    Type of Exam MUST be documented for Diabetic Eye Exams  Please CHECK ONE  Retinal Exam       Dilated Retinal Exam       OCT       Optomap-Iris Exam      Fundus Photography     Left Eye - Please check Retinopathy AND Type or No Retinopathy      Exam did show retinopathy    Exam did not show retinopathy         Mild     Proliferative           Moderate    Severe            None         Right Eye - Please check Retinopathy AND Type or No Retinopathy     Exam did show retinopathy    Exam did not show retinopathy         Mild     Proliferative        Moderate    Severe        None       Comments __________________________________________________________    Practice Providing Exam ______________________________________________    Exam Performed By (print name) _______________________________________      Provider Signature ___________________________________________________    These reports are needed for  compliance  Please fax this completed form and a copy of the Diabetic Eye Exam report to our office located at [place] [address] as soon as possible via [phone] davina Rodriguez Mention: Phone [phone]  We thank you for your assistance in treating our mutual patient

## 2022-10-17 NOTE — TELEPHONE ENCOUNTER
Upon review of the In Basket request and the patient's chart, initial outreach has been made via fax, please see Contacts section for details       Thank you  Breanna Winchester

## 2022-10-17 NOTE — TELEPHONE ENCOUNTER
----- Message from Scott Archer sent at 10/17/2022  8:23 AM EDT -----  Regarding: DM eye exam; [facility]  10/17/22 8:23 AM    Hello, our patient Momo Herman has had Diabetic Eye Exam completed/performed  Please assist in updating the patient chart by making an External outreach to [facility] facility [facility]  The date of service is past 3-4 months      Thank you,  Scott Archer [facility]

## 2022-10-19 ENCOUNTER — HOSPITAL ENCOUNTER (OUTPATIENT)
Dept: INFUSION CENTER | Facility: HOSPITAL | Age: 77
Discharge: HOME | End: 2022-10-19
Payer: MEDICARE

## 2022-10-19 VITALS
TEMPERATURE: 97.3 F | DIASTOLIC BLOOD PRESSURE: 66 MMHG | SYSTOLIC BLOOD PRESSURE: 147 MMHG | BODY MASS INDEX: 33.21 KG/M2 | WEIGHT: 245.2 LBS | HEIGHT: 72 IN | OXYGEN SATURATION: 97 % | RESPIRATION RATE: 14 BRPM | HEART RATE: 70 BPM

## 2022-10-19 DIAGNOSIS — C34.90 NSCLC METASTATIC TO BONE (HCC): Primary | ICD-10-CM

## 2022-10-19 DIAGNOSIS — C79.51 NSCLC METASTATIC TO BONE (HCC): Primary | ICD-10-CM

## 2022-10-19 PROCEDURE — 96413 CHEMO IV INFUSION 1 HR: CPT

## 2022-10-19 RX ORDER — SODIUM CHLORIDE 9 MG/ML
20 INJECTION, SOLUTION INTRAVENOUS ONCE
Status: COMPLETED | OUTPATIENT
Start: 2022-10-19 | End: 2022-10-19

## 2022-10-19 RX ADMIN — SODIUM CHLORIDE 200 MG: 9 INJECTION, SOLUTION INTRAVENOUS at 14:33

## 2022-10-19 RX ADMIN — SODIUM CHLORIDE 20 ML/HR: 9 INJECTION, SOLUTION INTRAVENOUS at 14:33

## 2022-10-19 NOTE — PROGRESS NOTES
Pt here today for keytruda tolerated well no adverse reactions declined AVS and left ambulatory with steady gait utilizing a cane

## 2022-10-19 NOTE — PLAN OF CARE
Problem: Potential for Falls  Goal: Patient will remain free of falls  Description: INTERVENTIONS:  - Educate patient/family on patient safety including physical limitations  - Instruct patient to call for assistance with activity   - Keep Call bell within reach  - Keep care items and personal belongings within reach  - Initiate and maintain comfort rounds  - Make Fall Risk Sign visible to staff  - Consider moving patient to room near nurses station  Outcome: Progressing     Problem: Knowledge Deficit  Goal: Patient/family/caregiver demonstrates understanding of disease process, treatment plan, medications, and discharge instructions  Description: Complete learning assessment and assess knowledge base    Interventions:  - Provide teaching at level of understanding  - Provide teaching via preferred learning methods  Outcome: Progressing

## 2022-10-24 NOTE — TELEPHONE ENCOUNTER
As a follow-up, a second attempt has been made for outreach via fax to facility  , please see Contacts section for details       [employer] has informed our dept that they are upgrading computer system and may take a bit longer to send out requests    Thank you  Robert Harper

## 2022-10-27 NOTE — TELEPHONE ENCOUNTER
Upon review of the In Basket request we were able to locate, review, and update the patient chart as requested for Diabetic Eye Exam     Any additional questions or concerns should be emailed to the Practice Liaisons via the appropriate education email address, please do not reply via In Basket      Thank you  Tanna Wynn

## 2022-10-31 DIAGNOSIS — I10 ESSENTIAL HYPERTENSION: Primary | ICD-10-CM

## 2022-10-31 RX ORDER — HYDROCHLOROTHIAZIDE 25 MG/1
25 TABLET ORAL DAILY
Qty: 90 TABLET | Refills: 3 | Status: SHIPPED | OUTPATIENT
Start: 2022-10-31

## 2022-10-31 RX ORDER — HYDROCHLOROTHIAZIDE 25 MG/1
25 TABLET ORAL DAILY
Qty: 30 TABLET | Status: CANCELLED | OUTPATIENT
Start: 2022-10-31

## 2022-10-31 RX ORDER — LISINOPRIL 20 MG/1
20 TABLET ORAL DAILY
Qty: 30 TABLET | Status: CANCELLED | OUTPATIENT
Start: 2022-10-31

## 2022-10-31 RX ORDER — LISINOPRIL 20 MG/1
20 TABLET ORAL DAILY
Qty: 90 TABLET | Refills: 3 | Status: SHIPPED | OUTPATIENT
Start: 2022-10-31

## 2022-11-07 ENCOUNTER — HOSPITAL ENCOUNTER (OUTPATIENT)
Dept: INFUSION CENTER | Facility: HOSPITAL | Age: 77
Discharge: HOME | End: 2022-11-07

## 2022-11-07 VITALS
OXYGEN SATURATION: 95 % | TEMPERATURE: 97.7 F | HEART RATE: 62 BPM | RESPIRATION RATE: 12 BRPM | DIASTOLIC BLOOD PRESSURE: 66 MMHG | SYSTOLIC BLOOD PRESSURE: 139 MMHG

## 2022-11-07 DIAGNOSIS — C34.90 NSCLC METASTATIC TO BONE (HCC): Primary | ICD-10-CM

## 2022-11-07 DIAGNOSIS — C79.51 NSCLC METASTATIC TO BONE (HCC): Primary | ICD-10-CM

## 2022-11-07 DIAGNOSIS — E11.65 TYPE 2 DIABETES MELLITUS WITH HYPERGLYCEMIA, WITHOUT LONG-TERM CURRENT USE OF INSULIN (HCC): ICD-10-CM

## 2022-11-07 DIAGNOSIS — E78.2 MIXED HYPERLIPIDEMIA: Primary | ICD-10-CM

## 2022-11-07 LAB
ALBUMIN SERPL BCP-MCNC: 3.4 G/DL (ref 3.5–5)
ALP SERPL-CCNC: 88 U/L (ref 46–116)
ALT SERPL W P-5'-P-CCNC: 35 U/L (ref 12–78)
ANION GAP SERPL CALCULATED.3IONS-SCNC: 3 MMOL/L (ref 4–13)
AST SERPL W P-5'-P-CCNC: 31 U/L (ref 5–45)
BASOPHILS # BLD AUTO: 0.04 THOUSANDS/ÂΜL (ref 0–0.1)
BASOPHILS NFR BLD AUTO: 1 % (ref 0–1)
BILIRUB SERPL-MCNC: 0.62 MG/DL (ref 0.2–1)
BUN SERPL-MCNC: 24 MG/DL (ref 5–25)
CALCIUM ALBUM COR SERPL-MCNC: 9.5 MG/DL (ref 8.3–10.1)
CALCIUM SERPL-MCNC: 9 MG/DL (ref 8.3–10.1)
CHLORIDE SERPL-SCNC: 109 MMOL/L (ref 96–108)
CO2 SERPL-SCNC: 27 MMOL/L (ref 21–32)
CREAT SERPL-MCNC: 0.94 MG/DL (ref 0.6–1.3)
EOSINOPHIL # BLD AUTO: 0.18 THOUSAND/ÂΜL (ref 0–0.61)
EOSINOPHIL NFR BLD AUTO: 5 % (ref 0–6)
ERYTHROCYTE [DISTWIDTH] IN BLOOD BY AUTOMATED COUNT: 14.1 % (ref 11.6–15.1)
GFR SERPL CREATININE-BSD FRML MDRD: 77 ML/MIN/1.73SQ M
GLUCOSE P FAST SERPL-MCNC: 116 MG/DL (ref 65–99)
GLUCOSE SERPL-MCNC: 116 MG/DL (ref 65–140)
HCT VFR BLD AUTO: 45.7 % (ref 36.5–49.3)
HGB BLD-MCNC: 15 G/DL (ref 12–17)
IMM GRANULOCYTES # BLD AUTO: 0.02 THOUSAND/UL (ref 0–0.2)
IMM GRANULOCYTES NFR BLD AUTO: 1 % (ref 0–2)
LYMPHOCYTES # BLD AUTO: 0.44 THOUSANDS/ÂΜL (ref 0.6–4.47)
LYMPHOCYTES NFR BLD AUTO: 12 % (ref 14–44)
MCH RBC QN AUTO: 31.6 PG (ref 26.8–34.3)
MCHC RBC AUTO-ENTMCNC: 32.8 G/DL (ref 31.4–37.4)
MCV RBC AUTO: 96 FL (ref 82–98)
MONOCYTES # BLD AUTO: 0.41 THOUSAND/ÂΜL (ref 0.17–1.22)
MONOCYTES NFR BLD AUTO: 12 % (ref 4–12)
NEUTROPHILS # BLD AUTO: 2.47 THOUSANDS/ÂΜL (ref 1.85–7.62)
NEUTS SEG NFR BLD AUTO: 69 % (ref 43–75)
NRBC BLD AUTO-RTO: 0 /100 WBCS
PLATELET # BLD AUTO: 164 THOUSANDS/UL (ref 149–390)
PMV BLD AUTO: 11.6 FL (ref 8.9–12.7)
POTASSIUM SERPL-SCNC: 3.6 MMOL/L (ref 3.5–5.3)
PROT SERPL-MCNC: 7.1 G/DL (ref 6.4–8.4)
RBC # BLD AUTO: 4.75 MILLION/UL (ref 3.88–5.62)
SODIUM SERPL-SCNC: 139 MMOL/L (ref 135–147)
T3FREE SERPL-MCNC: 2.38 PG/ML (ref 2.3–4.2)
TSH SERPL DL<=0.05 MIU/L-ACNC: 1.34 UIU/ML (ref 0.45–4.5)
WBC # BLD AUTO: 3.56 THOUSAND/UL (ref 4.31–10.16)

## 2022-11-07 RX ORDER — GLIPIZIDE 2.5 MG/1
2.5 TABLET, EXTENDED RELEASE ORAL DAILY
Qty: 90 TABLET | Refills: 1 | Status: SHIPPED | OUTPATIENT
Start: 2022-11-07

## 2022-11-07 RX ORDER — LOVASTATIN 20 MG/1
20 TABLET ORAL DAILY
Qty: 90 TABLET | Refills: 1 | Status: SHIPPED | OUTPATIENT
Start: 2022-11-07

## 2022-11-07 NOTE — PROGRESS NOTES
Pt had labs drawn without difficulty  Port de-accessed and bandage applied  Next appt scheduled  Pt left ambulatory with steady gait for d/c

## 2022-11-09 ENCOUNTER — HOSPITAL ENCOUNTER (OUTPATIENT)
Dept: INFUSION CENTER | Facility: HOSPITAL | Age: 77
Discharge: HOME | End: 2022-11-09

## 2022-11-09 ENCOUNTER — OFFICE VISIT (OUTPATIENT)
Dept: HEMATOLOGY ONCOLOGY | Facility: HOSPITAL | Age: 77
End: 2022-11-09

## 2022-11-09 VITALS
WEIGHT: 245 LBS | SYSTOLIC BLOOD PRESSURE: 128 MMHG | HEIGHT: 72 IN | RESPIRATION RATE: 16 BRPM | OXYGEN SATURATION: 96 % | HEART RATE: 80 BPM | BODY MASS INDEX: 33.18 KG/M2 | TEMPERATURE: 96.9 F | DIASTOLIC BLOOD PRESSURE: 60 MMHG

## 2022-11-09 VITALS
TEMPERATURE: 98.1 F | HEIGHT: 72 IN | BODY MASS INDEX: 33.18 KG/M2 | DIASTOLIC BLOOD PRESSURE: 65 MMHG | HEART RATE: 71 BPM | RESPIRATION RATE: 16 BRPM | SYSTOLIC BLOOD PRESSURE: 136 MMHG | OXYGEN SATURATION: 97 % | WEIGHT: 244.93 LBS

## 2022-11-09 DIAGNOSIS — C34.90 NSCLC METASTATIC TO BONE (HCC): Primary | ICD-10-CM

## 2022-11-09 DIAGNOSIS — C79.51 NSCLC METASTATIC TO BONE (HCC): Primary | ICD-10-CM

## 2022-11-09 RX ORDER — SODIUM CHLORIDE 9 MG/ML
20 INJECTION, SOLUTION INTRAVENOUS ONCE
OUTPATIENT
Start: 2022-11-30

## 2022-11-09 RX ORDER — SODIUM CHLORIDE 9 MG/ML
20 INJECTION, SOLUTION INTRAVENOUS ONCE
Status: COMPLETED | OUTPATIENT
Start: 2022-11-09 | End: 2022-11-09

## 2022-11-09 RX ORDER — SODIUM CHLORIDE 9 MG/ML
20 INJECTION, SOLUTION INTRAVENOUS ONCE
OUTPATIENT
Start: 2022-12-21

## 2022-11-09 RX ADMIN — SODIUM CHLORIDE 20 ML/HR: 9 INJECTION, SOLUTION INTRAVENOUS at 10:17

## 2022-11-09 RX ADMIN — SODIUM CHLORIDE 200 MG: 9 INJECTION, SOLUTION INTRAVENOUS at 10:17

## 2022-11-09 NOTE — PROGRESS NOTES
Pt tolerated Keytruda infusion without complication  Port de-accessed and bandage applied  Next appt scheduled  Pt left ambulatory with steady gait for d/c

## 2022-11-13 NOTE — PROGRESS NOTES
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, PDL-1 20%    3/2018 - 2018 - carbo/alimta x 6    2018 - 2019 - maintenance alimta    2019 - pembrolizumab every 3 weeks, RT to T11 to L1          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 ([facility] [address] )   3/1/2018 -  Cancer Staged    Staging form: Lung, AJCC 8th Edition  - Clinical stage from 3/1/2018: Stage IVB (cTX, cN2, cM1c) - Signed by Alfredo Randle DO on 2022 Initial Diagnosis    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 ([facility] [address] )     2022 -  Chemotherapy    pembrolizumab (KEYTRUDA) IVPB, 200 mg, Intravenous, Once, 9 of 13 cycles  Administration: 200 mg (2022), 200 mg (6/15/2022), 200 mg (2022), 200 mg (2022), 200 mg (2022), 200 mg (2022), 200 mg (2022), 200 mg (10/19/2022), 200 mg (2022)         Interval History: patient presents for follow up of his metastatic lung cancer  He is on pembro every 3 weeks  Labs prior to this visit are normal   He has had diarrhea but he is able to associated it with dairy consumption  He denies any HA  No new pain  No dry cough  He has a mild rash that Is stable  He is having some stress in the family right now because his MIL fell and sustained a C6 vertebral fracture  REVIEW OF SYSTEMS:  Please note that a 14-point review of systems was performed to include Constitutional, HEENT, Respiratory, CVS, GI, , Musculoskeletal, Integumentary, Neurologic, Rheumatologic, Endocrinologic, Psychiatric, Lymphatic, and Hematologic/Oncologic systems were reviewed and are negative unless otherwise stated in HPI   Positive and negative findings pertinent to this evaluation are incorporated into the history of present illness  ECOG PS: 1    PROBLEM LIST:  Patient Active Problem List   Diagnosis   • 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 [facility])   • Essential hypertension   • Hypokalemia   • Mixed hyperlipidemia   • Glaucoma of both eyes   • Type 2 diabetes mellitus with hyperglycemia, without long-term current use of insulin (HCC)   • Colostomy in place [facility])   • Dysequilibrium   • Medicare annual wellness visit, subsequent       Assessment / Plan: we will continue with radha every 3 weeks  His last scans were in sept and they were stable  I will see him back in 6 weeks  I offered emotional support as well about what is going on with his MIL  he knows to call in the interim with any questions or concerns  I spent 30 minutes on chart review, face to face counseling time, coordination of care and documentation  Past Medical History:   has a past medical history of Diabetes mellitus ([address] ), High blood pressure, and High cholesterol  PAST SURGICAL HISTORY:   has a past surgical history that includes Elbow surgery (Left, 2004)      CURRENT MEDICATIONS  Current Outpatient Medications   Medication Sig Dispense Refill   • Calcium Carb-Cholecalciferol (CALCIUM 1000 + D PO) Take by mouth     • Cholecalciferol (Vitamin D3) 1 25 MG (23380 UT) CAPS Take by mouth     • Contour Next Test test strip Test blood sugar once daily 100 strip 3   • dorzolamide (TRUSOPT) 2 % ophthalmic solution      • dorzolamide-timolol (COSOPT) 22 3-6 8 MG/ML ophthalmic solution INSTILL 1 DROP INTO EACH EYE TWICE DAILY     • glipiZIDE (GLUCOTROL XL) 2 5 mg 24 hr tablet Take 1 tablet (2 5 mg total) by mouth daily 90 tablet 1   • hydrochlorothiazide (HYDRODIURIL) 25 mg tablet Take 1 tablet (25 mg total) by mouth daily 90 tablet 3   • lisinopril (ZESTRIL) 20 mg tablet Take 1 tablet (20 mg total) by mouth daily 90 tablet 3   • lovastatin (MEVACOR) 20 mg tablet Take 1 tablet (20 mg total) by mouth daily 90 tablet 1   • methocarbamol (ROBAXIN) 500 mg tablet Take 1 tablet (500 mg total) by mouth 3 (three) times a day 20 tablet 0   • Microlet Lancets MISC Use to test glucose once a day 100 each 3   • neomycin-polymyxin-hydrocortisone (CORTISPORIN) otic solution Administer 4 drops to the right ear every 8 (eight) hours 10 mL 0   • potassium chloride (K-DUR,KLOR-CON) 20 mEq tablet Take 1 tablet by mouth once daily 90 tablet 0     No current facility-administered medications for this visit  [unfilled]    SOCIAL HISTORY:   reports that he quit smoking about 16 years ago  His smoking use included cigarettes  He started smoking about 65 years ago  He has a 50 00 pack-year smoking history  He has never used smokeless tobacco  He reports current alcohol use of about 21 0 standard drinks of alcohol per week  He reports that he does not use drugs  FAMILY HISTORY:  family history includes Colon cancer in his father  ALLERGIES:  is allergic to aspirin and ibuprofen  Physical Exam:  Vital Signs:   Visit Vitals  /60 (BP Location: Left arm, Patient Position: Sitting, Cuff Size: Adult)   Pulse 80   Temp (!) 96 9 °F (36 1 °C) (Temporal)   Resp 16   Ht 6' (1 829 m)   Wt 111 kg (245 lb)   SpO2 96%   BMI 33 23 kg/m²   Smoking Status Former Smoker   BSA 2 32 m²     Body mass index is 33 23 kg/m²  Body surface area is 2 32 meters squared  GEN: Alert, awake oriented x3, in no acute distress  HEENT- No pallor, icterus, cyanosis, no oral mucosal lesions,   LAD - no palpable cervical, clavicle, axillary, inguinal LAD  Heart- normal S1 S2, regular rate and rhythm, No murmur, rubs     Lungs- clear breathing sound bilateral    Abdomen- soft, Non tender, bowel sounds present  Extremities- No cyanosis, clubbing, edema  Neuro- No focal neurological deficit    Labs:  Lab Results   Component Value Date    WBC 3 56 (L) 11/07/2022    HGB 15 0 11/07/2022    HCT 45 7 11/07/2022    MCV 96 11/07/2022     11/07/2022     Lab Results   Component Value Date    SODIUM 139 11/07/2022    K 3 6 11/07/2022     (H) 11/07/2022    CO2 27 11/07/2022    AGAP 3 (L) 11/07/2022    BUN 24 11/07/2022    CREATININE 0 94 11/07/2022    GLUC 116 11/07/2022    GLUF 116 (H) 11/07/2022    CALCIUM 9 0 11/07/2022    AST 31 11/07/2022    ALT 35 11/07/2022    ALKPHOS 88 11/07/2022    TP 7 1 11/07/2022    TBILI 0 62 11/07/2022    EGFR 77 11/07/2022

## 2022-11-28 ENCOUNTER — HOSPITAL ENCOUNTER (OUTPATIENT)
Dept: INFUSION CENTER | Facility: HOSPITAL | Age: 77
Discharge: HOME | End: 2022-11-28

## 2022-11-28 DIAGNOSIS — C79.51 NSCLC METASTATIC TO BONE (HCC): Primary | ICD-10-CM

## 2022-11-28 DIAGNOSIS — C34.90 NSCLC METASTATIC TO BONE (HCC): Primary | ICD-10-CM

## 2022-11-28 LAB
ALBUMIN SERPL BCP-MCNC: 3.4 G/DL (ref 3.5–5)
ALP SERPL-CCNC: 88 U/L (ref 46–116)
ALT SERPL W P-5'-P-CCNC: 40 U/L (ref 12–78)
ANION GAP SERPL CALCULATED.3IONS-SCNC: 10 MMOL/L (ref 4–13)
AST SERPL W P-5'-P-CCNC: 34 U/L (ref 5–45)
BASOPHILS # BLD AUTO: 0.03 THOUSANDS/ÂΜL (ref 0–0.1)
BASOPHILS NFR BLD AUTO: 1 % (ref 0–1)
BILIRUB SERPL-MCNC: 0.6 MG/DL (ref 0.2–1)
BUN SERPL-MCNC: 20 MG/DL (ref 5–25)
CALCIUM ALBUM COR SERPL-MCNC: 9.3 MG/DL (ref 8.3–10.1)
CALCIUM SERPL-MCNC: 8.8 MG/DL (ref 8.3–10.1)
CHLORIDE SERPL-SCNC: 109 MMOL/L (ref 96–108)
CO2 SERPL-SCNC: 25 MMOL/L (ref 21–32)
CREAT SERPL-MCNC: 0.97 MG/DL (ref 0.6–1.3)
EOSINOPHIL # BLD AUTO: 0.24 THOUSAND/ÂΜL (ref 0–0.61)
EOSINOPHIL NFR BLD AUTO: 6 % (ref 0–6)
ERYTHROCYTE [DISTWIDTH] IN BLOOD BY AUTOMATED COUNT: 14 % (ref 11.6–15.1)
GFR SERPL CREATININE-BSD FRML MDRD: 74 ML/MIN/1.73SQ M
GLUCOSE SERPL-MCNC: 150 MG/DL (ref 65–140)
HCT VFR BLD AUTO: 45.9 % (ref 36.5–49.3)
HGB BLD-MCNC: 15.3 G/DL (ref 12–17)
IMM GRANULOCYTES # BLD AUTO: 0.02 THOUSAND/UL (ref 0–0.2)
IMM GRANULOCYTES NFR BLD AUTO: 1 % (ref 0–2)
LYMPHOCYTES # BLD AUTO: 0.45 THOUSANDS/ÂΜL (ref 0.6–4.47)
LYMPHOCYTES NFR BLD AUTO: 10 % (ref 14–44)
MCH RBC QN AUTO: 31.8 PG (ref 26.8–34.3)
MCHC RBC AUTO-ENTMCNC: 33.3 G/DL (ref 31.4–37.4)
MCV RBC AUTO: 95 FL (ref 82–98)
MONOCYTES # BLD AUTO: 0.45 THOUSAND/ÂΜL (ref 0.17–1.22)
MONOCYTES NFR BLD AUTO: 10 % (ref 4–12)
NEUTROPHILS # BLD AUTO: 3.16 THOUSANDS/ÂΜL (ref 1.85–7.62)
NEUTS SEG NFR BLD AUTO: 72 % (ref 43–75)
NRBC BLD AUTO-RTO: 0 /100 WBCS
PLATELET # BLD AUTO: 151 THOUSANDS/UL (ref 149–390)
PMV BLD AUTO: 11.4 FL (ref 8.9–12.7)
POTASSIUM SERPL-SCNC: 3.6 MMOL/L (ref 3.5–5.3)
PROT SERPL-MCNC: 7.5 G/DL (ref 6.4–8.4)
RBC # BLD AUTO: 4.81 MILLION/UL (ref 3.88–5.62)
SODIUM SERPL-SCNC: 144 MMOL/L (ref 135–147)
T3FREE SERPL-MCNC: 2.71 PG/ML (ref 2.3–4.2)
TSH SERPL DL<=0.05 MIU/L-ACNC: 1.43 UIU/ML (ref 0.45–4.5)
WBC # BLD AUTO: 4.35 THOUSAND/UL (ref 4.31–10.16)

## 2022-11-30 ENCOUNTER — HOSPITAL ENCOUNTER (OUTPATIENT)
Dept: INFUSION CENTER | Facility: HOSPITAL | Age: 77
Discharge: HOME | End: 2022-11-30

## 2022-11-30 VITALS
DIASTOLIC BLOOD PRESSURE: 68 MMHG | HEIGHT: 72 IN | TEMPERATURE: 98.6 F | WEIGHT: 246.03 LBS | RESPIRATION RATE: 16 BRPM | OXYGEN SATURATION: 96 % | BODY MASS INDEX: 33.32 KG/M2 | HEART RATE: 69 BPM | SYSTOLIC BLOOD PRESSURE: 142 MMHG

## 2022-11-30 DIAGNOSIS — C34.90 NSCLC METASTATIC TO BONE (HCC): Primary | ICD-10-CM

## 2022-11-30 DIAGNOSIS — C79.51 NSCLC METASTATIC TO BONE (HCC): Primary | ICD-10-CM

## 2022-11-30 RX ORDER — SODIUM CHLORIDE 9 MG/ML
20 INJECTION, SOLUTION INTRAVENOUS ONCE
Status: COMPLETED | OUTPATIENT
Start: 2022-11-30 | End: 2022-11-30

## 2022-11-30 RX ADMIN — SODIUM CHLORIDE 200 MG: 9 INJECTION, SOLUTION INTRAVENOUS at 12:34

## 2022-11-30 RX ADMIN — SODIUM CHLORIDE 20 ML/HR: 9 INJECTION, SOLUTION INTRAVENOUS at 12:34

## 2022-11-30 NOTE — PROGRESS NOTES
Patient in infusion center today for chemotherapy infusion  VSS  Labs within range  Patient tolerated treatment today with no adverse reactions  Pt educated to notify MD with s/s of reactions including SOB, Rash, and hives Patient gave verbal understanding  Patient then d/cd home ambulatory with steady gait

## 2022-12-16 PROBLEM — Z00.00 MEDICARE ANNUAL WELLNESS VISIT, SUBSEQUENT: Status: RESOLVED | Noted: 2022-10-17 | Resolved: 2022-12-16

## 2022-12-19 ENCOUNTER — HOSPITAL ENCOUNTER (OUTPATIENT)
Dept: INFUSION CENTER | Facility: HOSPITAL | Age: 77
Discharge: HOME | End: 2022-12-19

## 2022-12-19 DIAGNOSIS — C79.51 NSCLC METASTATIC TO BONE (HCC): Primary | ICD-10-CM

## 2022-12-19 DIAGNOSIS — C34.90 NSCLC METASTATIC TO BONE (HCC): Primary | ICD-10-CM

## 2022-12-19 LAB
ALBUMIN SERPL BCP-MCNC: 3.3 G/DL (ref 3.5–5)
ALP SERPL-CCNC: 87 U/L (ref 46–116)
ALT SERPL W P-5'-P-CCNC: 35 U/L (ref 12–78)
ANION GAP SERPL CALCULATED.3IONS-SCNC: 10 MMOL/L (ref 4–13)
AST SERPL W P-5'-P-CCNC: 30 U/L (ref 5–45)
BASOPHILS # BLD AUTO: 0.05 THOUSANDS/ÂΜL (ref 0–0.1)
BASOPHILS NFR BLD AUTO: 1 % (ref 0–1)
BILIRUB SERPL-MCNC: 0.8 MG/DL (ref 0.2–1)
BUN SERPL-MCNC: 20 MG/DL (ref 5–25)
CALCIUM ALBUM COR SERPL-MCNC: 9.7 MG/DL (ref 8.3–10.1)
CALCIUM SERPL-MCNC: 9.1 MG/DL (ref 8.3–10.1)
CHLORIDE SERPL-SCNC: 106 MMOL/L (ref 96–108)
CO2 SERPL-SCNC: 27 MMOL/L (ref 21–32)
CREAT SERPL-MCNC: 0.99 MG/DL (ref 0.6–1.3)
EOSINOPHIL # BLD AUTO: 0.18 THOUSAND/ÂΜL (ref 0–0.61)
EOSINOPHIL NFR BLD AUTO: 4 % (ref 0–6)
ERYTHROCYTE [DISTWIDTH] IN BLOOD BY AUTOMATED COUNT: 14.2 % (ref 11.6–15.1)
GFR SERPL CREATININE-BSD FRML MDRD: 72 ML/MIN/1.73SQ M
GLUCOSE SERPL-MCNC: 247 MG/DL (ref 65–140)
HCT VFR BLD AUTO: 47.9 % (ref 36.5–49.3)
HGB BLD-MCNC: 15.4 G/DL (ref 12–17)
IMM GRANULOCYTES # BLD AUTO: 0.03 THOUSAND/UL (ref 0–0.2)
IMM GRANULOCYTES NFR BLD AUTO: 1 % (ref 0–2)
LYMPHOCYTES # BLD AUTO: 0.45 THOUSANDS/ÂΜL (ref 0.6–4.47)
LYMPHOCYTES NFR BLD AUTO: 11 % (ref 14–44)
MCH RBC QN AUTO: 30.9 PG (ref 26.8–34.3)
MCHC RBC AUTO-ENTMCNC: 32.2 G/DL (ref 31.4–37.4)
MCV RBC AUTO: 96 FL (ref 82–98)
MONOCYTES # BLD AUTO: 0.34 THOUSAND/ÂΜL (ref 0.17–1.22)
MONOCYTES NFR BLD AUTO: 8 % (ref 4–12)
NEUTROPHILS # BLD AUTO: 3.16 THOUSANDS/ÂΜL (ref 1.85–7.62)
NEUTS SEG NFR BLD AUTO: 75 % (ref 43–75)
NRBC BLD AUTO-RTO: 0 /100 WBCS
PLATELET # BLD AUTO: 158 THOUSANDS/UL (ref 149–390)
PMV BLD AUTO: 12.1 FL (ref 8.9–12.7)
POTASSIUM SERPL-SCNC: 3.3 MMOL/L (ref 3.5–5.3)
PROT SERPL-MCNC: 7.4 G/DL (ref 6.4–8.4)
RBC # BLD AUTO: 4.98 MILLION/UL (ref 3.88–5.62)
SODIUM SERPL-SCNC: 143 MMOL/L (ref 135–147)
T3FREE SERPL-MCNC: 2.75 PG/ML (ref 2.3–4.2)
TSH SERPL DL<=0.05 MIU/L-ACNC: 1.35 UIU/ML (ref 0.45–4.5)
WBC # BLD AUTO: 4.21 THOUSAND/UL (ref 4.31–10.16)

## 2022-12-19 NOTE — PROGRESS NOTES
Pt arrived to unit for port flush and labs  Port accessed and labs obtained without difficulty  Pt aware of next appointment, declined AVS  Left unit ambulatory with cane and steady gait

## 2022-12-21 ENCOUNTER — TELEPHONE (OUTPATIENT)
Dept: HEMATOLOGY ONCOLOGY | Facility: HOSPITAL | Age: 77
End: 2022-12-21

## 2022-12-21 ENCOUNTER — OFFICE VISIT (OUTPATIENT)
Dept: HEMATOLOGY ONCOLOGY | Facility: HOSPITAL | Age: 77
End: 2022-12-21

## 2022-12-21 ENCOUNTER — HOSPITAL ENCOUNTER (OUTPATIENT)
Dept: INFUSION CENTER | Facility: HOSPITAL | Age: 77
Discharge: HOME | End: 2022-12-21

## 2022-12-21 VITALS
TEMPERATURE: 97.6 F | SYSTOLIC BLOOD PRESSURE: 128 MMHG | BODY MASS INDEX: 33.09 KG/M2 | RESPIRATION RATE: 18 BRPM | HEART RATE: 86 BPM | WEIGHT: 244.27 LBS | DIASTOLIC BLOOD PRESSURE: 62 MMHG | OXYGEN SATURATION: 94 % | HEIGHT: 72 IN

## 2022-12-21 VITALS
BODY MASS INDEX: 33.59 KG/M2 | WEIGHT: 248 LBS | HEIGHT: 72 IN | SYSTOLIC BLOOD PRESSURE: 102 MMHG | OXYGEN SATURATION: 96 % | HEART RATE: 95 BPM | DIASTOLIC BLOOD PRESSURE: 56 MMHG | TEMPERATURE: 96.6 F | RESPIRATION RATE: 16 BRPM

## 2022-12-21 DIAGNOSIS — C34.90 NSCLC METASTATIC TO BONE (HCC): Primary | ICD-10-CM

## 2022-12-21 DIAGNOSIS — C79.51 NSCLC METASTATIC TO BONE (HCC): Primary | ICD-10-CM

## 2022-12-21 RX ORDER — SODIUM CHLORIDE 9 MG/ML
20 INJECTION, SOLUTION INTRAVENOUS ONCE
OUTPATIENT
Start: 2023-01-11

## 2022-12-21 RX ORDER — SODIUM CHLORIDE 9 MG/ML
20 INJECTION, SOLUTION INTRAVENOUS ONCE
Status: CANCELLED | OUTPATIENT
Start: 2022-12-21

## 2022-12-21 RX ORDER — SODIUM CHLORIDE 9 MG/ML
20 INJECTION, SOLUTION INTRAVENOUS ONCE
Status: DISCONTINUED | OUTPATIENT
Start: 2022-12-21 | End: 2022-12-21

## 2022-12-21 RX ORDER — SODIUM CHLORIDE 9 MG/ML
20 INJECTION, SOLUTION INTRAVENOUS ONCE
Status: COMPLETED | OUTPATIENT
Start: 2022-12-21 | End: 2022-12-21

## 2022-12-21 RX ADMIN — SODIUM CHLORIDE 200 MG: 9 INJECTION, SOLUTION INTRAVENOUS at 14:28

## 2022-12-21 RX ADMIN — SODIUM CHLORIDE 20 ML/HR: 0.9 INJECTION, SOLUTION INTRAVENOUS at 11:55

## 2022-12-21 NOTE — PROGRESS NOTES
Pt  Tolerated Keytruda 200 mg without adverse event  Future appointments reviewed as scheduled for 3 weeks including lab appts via port a cath  AVS declined

## 2022-12-21 NOTE — PROGRESS NOTES
TIME OUT: Received call from [facility] AND [facility]  Per Dr Laine Guillaume, [address]  Will continue with Keytruda q 6 weeks with dose changed to 400mg  This change will begin today

## 2022-12-21 NOTE — TELEPHONE ENCOUNTER
Title: Medication time out/change to orders    Date patient scheduled: 12/21/22    Spoke with Shasha Cotton, RN at [address]  Keytruda changed back to 200mg q 3 weeks due to patient stating that he developed a severe rash when on Keytruda 400mg q 6 weeks  Pharmacy made aware of the change  Office RN notified patient? ? Yes, patient notified     Is the patient scheduled within 24 hours? ? If yes, follow up with verbal telephone call  Office RN to route to [facility]  Infusion  pool routes to [facility]  Infusion tech to receive message, confirm scheduled treatment duration matches ordered treatment duration or adjust accordingly, and re-link appointment request orders  Infusion tech to notify pharmacy and finance

## 2022-12-21 NOTE — PROGRESS NOTES
Pt  Denies new symptoms or concerns today  Labs reviewed  K+ 3 3  Per Pt  He was instructed by Dr Lydia Hartman to double dose ((2) 20 meq ) for next couple of days  Keytruda ordered for infusion today with a changed dose of 400mg q 6 weeks

## 2022-12-21 NOTE — PROGRESS NOTES
TIME OUT: Received call from [address] stating Grayce Jean Claude order will return to 200mg q 3 weeks  Pharmacy notified per [facility]

## 2022-12-21 NOTE — TELEPHONE ENCOUNTER
Title: Medication time out/change to orders    Date patient scheduled: 12/21/22    Spoke with Roberto Lane, RN at [address] regarding patient' keytruda  I informed her that his Harmon Claudy is going to be q6 weeks now at 400mg  Office RN notified patient? ? Yes, patient present in office     Is the patient scheduled within 24 hours? ? If yes, follow up with verbal telephone call  Office RN to route to [facility]  Infusion  pool routes to [facility] [place]  Infusion tech to receive message, confirm scheduled treatment duration matches ordered treatment duration or adjust accordingly, and re-link appointment request orders  Infusion tech to notify pharmacy and finance

## 2022-12-21 NOTE — PROGRESS NOTES
Pt verbalized receiving a text message from his spouse stating he had a severe rash when Izabella Maya increased 2 years ago in [place]  Pt  Stated he did not remember this, but his wife felt Dr Taisha Felder should be made aware  RN notified physician

## 2022-12-22 ENCOUNTER — TELEPHONE (OUTPATIENT)
Dept: HEMATOLOGY ONCOLOGY | Facility: HOSPITAL | Age: 77
End: 2022-12-22

## 2022-12-23 DIAGNOSIS — C34.90 NSCLC METASTATIC TO BONE (HCC): Primary | ICD-10-CM

## 2022-12-23 DIAGNOSIS — C79.51 NSCLC METASTATIC TO BONE (HCC): Primary | ICD-10-CM

## 2022-12-23 NOTE — TELEPHONE ENCOUNTER
Spoke with patient  He is aware of schedule update  He will get an updated print out when he comes in for his next tx

## 2022-12-28 NOTE — PROGRESS NOTES
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, PDL-1 20%    3/2018 - 2018 - carbo/alimta x 6    2018 - 2019 - maintenance alimta    2019 - pembrolizumab every 3 weeks, RT to T11 to L1          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 ([facility] [address] )   3/1/2018 -  Cancer Staged    Staging form: Lung, AJCC 8th Edition  - Clinical stage from 3/1/2018: Stage IVB (cTX, cN2, cM1c) - Signed by [facility] DO Robert on 2022 Initial Diagnosis    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 ([facility] [address] )     2022 - 2022 Chemotherapy    pembrolizumab (KEYTRUDA) IVPB, 200 mg, Intravenous, Once, 10 of 13 cycles  Administration: 200 mg (2022), 200 mg (6/15/2022), 200 mg (2022), 200 mg (2022), 200 mg (2022), 200 mg (2022), 200 mg (2022), 200 mg (10/19/2022), 200 mg (2022), 200 mg (2022)     2022 - 2022 Chemotherapy    pembrolizumab (KEYTRUDA) IVPB, 400 mg, Intravenous, Once, 1 of 6 cycles     2022 -  Chemotherapy    pembrolizumab (KEYTRUDA) IVPB, 200 mg, Intravenous, Once, 1 of 6 cycles  Administration: 200 mg (2022)         Interval History: patient presents for follow up of his stage IV NSCLC on maintenance pembro q 3 weeks  Labs prior to this visit were normal with the exception of a k=3 3  His main side effect from pembro is itching at night that wakes him from sleep  He denies any diarrhea or joint pain            REVIEW OF SYSTEMS:  Please note that a 14-point review of systems was performed to include Constitutional, HEENT, Respiratory, CVS, GI, , Musculoskeletal, Integumentary, Neurologic, Rheumatologic, Endocrinologic, Psychiatric, Lymphatic, and Hematologic/Oncologic systems were reviewed and are negative unless otherwise stated in HPI  Positive and negative findings pertinent to this evaluation are incorporated into the history of present illness  ECOG PS: 1    PROBLEM LIST:  Patient Active Problem List   Diagnosis   • 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 [facility])   • Essential hypertension   • Hypokalemia   • Mixed hyperlipidemia   • Glaucoma of both eyes   • Type 2 diabetes mellitus with hyperglycemia, without long-term current use of insulin (HCC)   • Colostomy in place [facility])   • Dysequilibrium       Assessment / Plan: we discussed changing his pembro to every 6 week dosing  We were going to make the change, but then his wife reminded him that he had a severe rash with that dosing in the past   We will continue with 200 mg every 3 weeks  I advised him to take 50 mg of benadryl before bed to help with the itching  I will see him back in 6 weeks in follow up  I also asked him to increase his kdur to BID for the next 5 days for the slightly low potassium level  I spent 30 minutes on chart review, face to face counseling time, coordination of care and documentation  Past Medical History:   has a past medical history of Diabetes mellitus ([facility] [address] ), High blood pressure, and High cholesterol  PAST SURGICAL HISTORY:   has a past surgical history that includes Elbow surgery (Left, 2004)      CURRENT MEDICATIONS  Current Outpatient Medications   Medication Sig Dispense Refill   • Calcium Carb-Cholecalciferol (CALCIUM 1000 + D PO) Take by mouth     • Cholecalciferol (Vitamin D3) 1 25 MG (69692 UT) CAPS Take by mouth     • Contour Next Test test strip Test blood sugar once daily 100 strip 3   • dorzolamide (TRUSOPT) 2 % ophthalmic solution      • dorzolamide-timolol (COSOPT) 22 3-6 8 MG/ML ophthalmic solution INSTILL 1 DROP INTO EACH EYE TWICE DAILY     • glipiZIDE (GLUCOTROL XL) 2 5 mg 24 hr tablet Take 1 tablet (2 5 mg total) by mouth daily 90 tablet 1   • hydrochlorothiazide (HYDRODIURIL) 25 mg tablet Take 1 tablet (25 mg total) by mouth daily 90 tablet 3   • lisinopril (ZESTRIL) 20 mg tablet Take 1 tablet (20 mg total) by mouth daily 90 tablet 3   • lovastatin (MEVACOR) 20 mg tablet Take 1 tablet (20 mg total) by mouth daily 90 tablet 1   • methocarbamol (ROBAXIN) 500 mg tablet Take 1 tablet (500 mg total) by mouth 3 (three) times a day 20 tablet 0   • Microlet Lancets MISC Use to test glucose once a day 100 each 3   • neomycin-polymyxin-hydrocortisone (CORTISPORIN) otic solution Administer 4 drops to the right ear every 8 (eight) hours 10 mL 0   • potassium chloride (K-DUR,KLOR-CON) 20 mEq tablet Take 1 tablet by mouth once daily 90 tablet 0     No current facility-administered medications for this visit  [unfilled]    SOCIAL HISTORY:   reports that he quit smoking about 17 years ago  His smoking use included cigarettes  He started smoking about 66 years ago  He has a 50 00 pack-year smoking history  He has never used smokeless tobacco  He reports current alcohol use of about 21 0 standard drinks per week  He reports that he does not use drugs  FAMILY HISTORY:  family history includes Colon cancer in his father  ALLERGIES:  is allergic to aspirin and ibuprofen  Physical Exam:  Vital Signs:   Visit Vitals  /56 (BP Location: Left arm, Patient Position: Sitting, Cuff Size: Adult)   Pulse 95   Temp (!) 96 6 °F (35 9 °C) (Temporal)   Resp 16   Ht 6' (1 829 m)   Wt 112 kg (248 lb)   SpO2 96%   BMI 33 63 kg/m²   Smoking Status Former   BSA 2 33 m²     Body mass index is 33 63 kg/m²  Body surface area is 2 33 meters squared  GEN: Alert, awake oriented x3, in no acute distress  HEENT- No pallor, icterus, cyanosis, no oral mucosal lesions,   LAD - no palpable cervical, clavicle, axillary, inguinal LAD  Heart- normal S1 S2, regular rate and rhythm, No murmur, rubs     Lungs- clear breathing sound bilateral    Abdomen- soft, Non tender, bowel sounds present  Extremities- No cyanosis, clubbing, edema  Neuro- No focal neurological deficit    Labs:  Lab Results   Component Value Date    WBC 4 21 (L) 12/19/2022    HGB 15 4 12/19/2022    HCT 47 9 12/19/2022    MCV 96 12/19/2022     12/19/2022     Lab Results   Component Value Date    SODIUM 143 12/19/2022    K 3 3 (L) 12/19/2022     12/19/2022    CO2 27 12/19/2022    AGAP 10 12/19/2022    BUN 20 12/19/2022    CREATININE 0 99 12/19/2022    GLUC 247 (H) 12/19/2022    GLUF 116 (H) 11/07/2022    CALCIUM 9 1 12/19/2022    AST 30 12/19/2022    ALT 35 12/19/2022    ALKPHOS 87 12/19/2022    TP 7 4 12/19/2022    TBILI 0 80 12/19/2022    EGFR 72 12/19/2022

## 2023-01-05 DIAGNOSIS — C34.90 NSCLC METASTATIC TO BONE (HCC): Primary | ICD-10-CM

## 2023-01-05 DIAGNOSIS — C79.51 NSCLC METASTATIC TO BONE (HCC): Primary | ICD-10-CM

## 2023-01-09 ENCOUNTER — TELEPHONE (OUTPATIENT)
Dept: HEMATOLOGY ONCOLOGY | Facility: HOSPITAL | Age: 78
End: 2023-01-09

## 2023-01-09 ENCOUNTER — HOSPITAL ENCOUNTER (OUTPATIENT)
Dept: INFUSION CENTER | Facility: HOSPITAL | Age: 78
Discharge: HOME | End: 2023-01-09

## 2023-01-09 DIAGNOSIS — C34.90 NSCLC METASTATIC TO BONE (HCC): ICD-10-CM

## 2023-01-09 DIAGNOSIS — R21 RASH: Primary | ICD-10-CM

## 2023-01-09 DIAGNOSIS — C79.51 NSCLC METASTATIC TO BONE (HCC): ICD-10-CM

## 2023-01-09 LAB
ALBUMIN SERPL BCP-MCNC: 3.1 G/DL (ref 3.5–5)
ALP SERPL-CCNC: 89 U/L (ref 46–116)
ALT SERPL W P-5'-P-CCNC: 32 U/L (ref 12–78)
ANION GAP SERPL CALCULATED.3IONS-SCNC: 3 MMOL/L (ref 4–13)
AST SERPL W P-5'-P-CCNC: 30 U/L (ref 5–45)
BASOPHILS # BLD AUTO: 0.03 THOUSANDS/ÂΜL (ref 0–0.1)
BASOPHILS NFR BLD AUTO: 1 % (ref 0–1)
BILIRUB SERPL-MCNC: 0.6 MG/DL (ref 0.2–1)
BUN SERPL-MCNC: 20 MG/DL (ref 5–25)
CALCIUM ALBUM COR SERPL-MCNC: 9.5 MG/DL (ref 8.3–10.1)
CALCIUM SERPL-MCNC: 8.8 MG/DL (ref 8.3–10.1)
CHLORIDE SERPL-SCNC: 111 MMOL/L (ref 96–108)
CO2 SERPL-SCNC: 28 MMOL/L (ref 21–32)
CREAT SERPL-MCNC: 0.87 MG/DL (ref 0.6–1.3)
EOSINOPHIL # BLD AUTO: 0.26 THOUSAND/ÂΜL (ref 0–0.61)
EOSINOPHIL NFR BLD AUTO: 7 % (ref 0–6)
ERYTHROCYTE [DISTWIDTH] IN BLOOD BY AUTOMATED COUNT: 14.1 % (ref 11.6–15.1)
GFR SERPL CREATININE-BSD FRML MDRD: 82 ML/MIN/1.73SQ M
GLUCOSE SERPL-MCNC: 126 MG/DL (ref 65–140)
HCT VFR BLD AUTO: 45.6 % (ref 36.5–49.3)
HGB BLD-MCNC: 14.9 G/DL (ref 12–17)
IMM GRANULOCYTES # BLD AUTO: 0.02 THOUSAND/UL (ref 0–0.2)
IMM GRANULOCYTES NFR BLD AUTO: 1 % (ref 0–2)
LYMPHOCYTES # BLD AUTO: 0.39 THOUSANDS/ÂΜL (ref 0.6–4.47)
LYMPHOCYTES NFR BLD AUTO: 10 % (ref 14–44)
MCH RBC QN AUTO: 31 PG (ref 26.8–34.3)
MCHC RBC AUTO-ENTMCNC: 32.7 G/DL (ref 31.4–37.4)
MCV RBC AUTO: 95 FL (ref 82–98)
MONOCYTES # BLD AUTO: 0.46 THOUSAND/ÂΜL (ref 0.17–1.22)
MONOCYTES NFR BLD AUTO: 12 % (ref 4–12)
NEUTROPHILS # BLD AUTO: 2.72 THOUSANDS/ÂΜL (ref 1.85–7.62)
NEUTS SEG NFR BLD AUTO: 69 % (ref 43–75)
NRBC BLD AUTO-RTO: 0 /100 WBCS
PLATELET # BLD AUTO: 166 THOUSANDS/UL (ref 149–390)
PMV BLD AUTO: 11.1 FL (ref 8.9–12.7)
POTASSIUM SERPL-SCNC: 3.5 MMOL/L (ref 3.5–5.3)
PROT SERPL-MCNC: 6.9 G/DL (ref 6.4–8.4)
RBC # BLD AUTO: 4.8 MILLION/UL (ref 3.88–5.62)
SODIUM SERPL-SCNC: 142 MMOL/L (ref 135–147)
T3FREE SERPL-MCNC: 2.83 PG/ML (ref 2.3–4.2)
TSH SERPL DL<=0.05 MIU/L-ACNC: 1.78 UIU/ML (ref 0.45–4.5)
WBC # BLD AUTO: 3.88 THOUSAND/UL (ref 4.31–10.16)

## 2023-01-09 RX ORDER — METHYLPREDNISOLONE 4 MG/1
TABLET ORAL
Qty: 1 EACH | Refills: 0 | Status: SHIPPED | OUTPATIENT
Start: 2023-01-09

## 2023-01-09 NOTE — TELEPHONE ENCOUNTER
Spoke with patient to let him know that I did receive his photos of his rash and his symptoms with Dr Sharri Chavse and she is prescribing him a medrol dose pack  I told the patient to call if there are any issues with this or if the rash gets worse  I told him that Dr Sharri Chaves states he can still get treatment on 1/11  Patient verbalized understanding 
Malignant neoplasm of lung, unspecified laterality, unspecified part of lung

## 2023-01-09 NOTE — PROGRESS NOTES
Ort labs drawn per protocol, no problems encountered  Pt discharged to home with steady gait aware of next appt on Wednesday

## 2023-01-11 ENCOUNTER — HOSPITAL ENCOUNTER (OUTPATIENT)
Dept: INFUSION CENTER | Facility: HOSPITAL | Age: 78
Discharge: HOME | End: 2023-01-11

## 2023-01-11 VITALS
DIASTOLIC BLOOD PRESSURE: 65 MMHG | OXYGEN SATURATION: 95 % | RESPIRATION RATE: 16 BRPM | SYSTOLIC BLOOD PRESSURE: 137 MMHG | TEMPERATURE: 98.7 F | HEART RATE: 63 BPM

## 2023-01-11 DIAGNOSIS — C79.51 NSCLC METASTATIC TO BONE (HCC): Primary | ICD-10-CM

## 2023-01-11 DIAGNOSIS — C34.90 NSCLC METASTATIC TO BONE (HCC): Primary | ICD-10-CM

## 2023-01-11 RX ORDER — SODIUM CHLORIDE 9 MG/ML
20 INJECTION, SOLUTION INTRAVENOUS ONCE
Status: COMPLETED | OUTPATIENT
Start: 2023-01-11 | End: 2023-01-11

## 2023-01-11 RX ADMIN — SODIUM CHLORIDE 200 MG: 9 INJECTION, SOLUTION INTRAVENOUS at 12:57

## 2023-01-11 RX ADMIN — SODIUM CHLORIDE 20 ML/HR: 9 INJECTION, SOLUTION INTRAVENOUS at 12:57

## 2023-01-11 NOTE — PROGRESS NOTES
Patient completed [facility] with no adverse reactions  Dressing CD&I  Schedule provided, left unit ambulatory with steady gait

## 2023-01-16 DIAGNOSIS — C34.90 NSCLC METASTATIC TO BONE (HCC): ICD-10-CM

## 2023-01-16 DIAGNOSIS — C79.51 NSCLC METASTATIC TO BONE (HCC): ICD-10-CM

## 2023-01-16 DIAGNOSIS — E87.6 HYPOKALEMIA: ICD-10-CM

## 2023-01-16 RX ORDER — POTASSIUM CHLORIDE 20 MEQ/1
TABLET, EXTENDED RELEASE ORAL
Qty: 90 TABLET | Refills: 0 | Status: SHIPPED | OUTPATIENT
Start: 2023-01-16

## 2023-01-25 ENCOUNTER — DOCUMENTATION (OUTPATIENT)
Dept: HEMATOLOGY ONCOLOGY | Facility: CLINIC | Age: 78
End: 2023-01-25

## 2023-01-25 RX ORDER — SODIUM CHLORIDE 9 MG/ML
20 INJECTION, SOLUTION INTRAVENOUS ONCE
Status: CANCELLED | OUTPATIENT
Start: 2023-02-01

## 2023-01-25 NOTE — PROGRESS NOTES
Oncology Finance Advocacy Intake and Intervention  Oncology Finance Counselor/Advocate placed call to patient  This writer informed patient that this writer is here to assist patient with billing questions, financial assistance, payment/payment plans, quotes, copayment assistance, insurance optimization, and insurance navigation  This writer conducted a thorough benefit review of copayment, deductible, and out of pocket cost  This information is documented below and has been reviewed with patient  Copayment: n/a  Deductible:$226 medicare  Out of Pocket Cost:   Insurance optimization (Limited benefit vs self-pay):  Patient assistance status:  Interventions:  Pt has active medicare A & B  Pt also has an active supplemental plan A thru [place] [employer]  Called pt so go over the benefits got his voicemail left a message for pt to call me back        Information above was review thoroughly with patient and patient was advise of possible assistance programs/interventions  If any question arise patient can contact this writer at below information  This information was given to patient at time of contact  Lasha Molina  Phone:[phone]  Email: [email or web address]  [email or web address]

## 2023-01-26 ENCOUNTER — DOCUMENTATION (OUTPATIENT)
Dept: HEMATOLOGY ONCOLOGY | Facility: CLINIC | Age: 78
End: 2023-01-26

## 2023-01-27 ENCOUNTER — DOCUMENTATION (OUTPATIENT)
Dept: HEMATOLOGY ONCOLOGY | Facility: CLINIC | Age: 78
End: 2023-01-27

## 2023-01-27 NOTE — PROGRESS NOTES
3RD ATTEMPT  Called pt to go over his benefits   got his voicemail left a message for pt to call me back    Pt called me back & I went over his benefits & he thanked me for the info   If he has any questions or concerns he will call me back

## 2023-01-30 ENCOUNTER — HOSPITAL ENCOUNTER (OUTPATIENT)
Dept: INFUSION CENTER | Facility: HOSPITAL | Age: 78
Discharge: HOME | End: 2023-01-30

## 2023-01-30 DIAGNOSIS — C34.90 NSCLC METASTATIC TO BONE (HCC): Primary | ICD-10-CM

## 2023-01-30 DIAGNOSIS — C79.51 NSCLC METASTATIC TO BONE (HCC): Primary | ICD-10-CM

## 2023-01-30 LAB
ALBUMIN SERPL BCP-MCNC: 3.2 G/DL (ref 3.5–5)
ALP SERPL-CCNC: 93 U/L (ref 46–116)
ALT SERPL W P-5'-P-CCNC: 32 U/L (ref 12–78)
ANION GAP SERPL CALCULATED.3IONS-SCNC: 8 MMOL/L (ref 4–13)
AST SERPL W P-5'-P-CCNC: 30 U/L (ref 5–45)
BASOPHILS # BLD AUTO: 0.02 THOUSANDS/ÂΜL (ref 0–0.1)
BASOPHILS NFR BLD AUTO: 0 % (ref 0–1)
BILIRUB SERPL-MCNC: 0.4 MG/DL (ref 0.2–1)
BUN SERPL-MCNC: 22 MG/DL (ref 5–25)
CALCIUM ALBUM COR SERPL-MCNC: 9.2 MG/DL (ref 8.3–10.1)
CALCIUM SERPL-MCNC: 8.6 MG/DL (ref 8.3–10.1)
CHLORIDE SERPL-SCNC: 108 MMOL/L (ref 96–108)
CO2 SERPL-SCNC: 28 MMOL/L (ref 21–32)
CREAT SERPL-MCNC: 0.94 MG/DL (ref 0.6–1.3)
EOSINOPHIL # BLD AUTO: 0.17 THOUSAND/ÂΜL (ref 0–0.61)
EOSINOPHIL NFR BLD AUTO: 4 % (ref 0–6)
ERYTHROCYTE [DISTWIDTH] IN BLOOD BY AUTOMATED COUNT: 14.4 % (ref 11.6–15.1)
GFR SERPL CREATININE-BSD FRML MDRD: 77 ML/MIN/1.73SQ M
GLUCOSE P FAST SERPL-MCNC: 188 MG/DL (ref 65–99)
GLUCOSE SERPL-MCNC: 188 MG/DL (ref 65–140)
HCT VFR BLD AUTO: 45.1 % (ref 36.5–49.3)
HGB BLD-MCNC: 14.9 G/DL (ref 12–17)
IMM GRANULOCYTES # BLD AUTO: 0.02 THOUSAND/UL (ref 0–0.2)
IMM GRANULOCYTES NFR BLD AUTO: 0 % (ref 0–2)
LYMPHOCYTES # BLD AUTO: 0.37 THOUSANDS/ÂΜL (ref 0.6–4.47)
LYMPHOCYTES NFR BLD AUTO: 8 % (ref 14–44)
MCH RBC QN AUTO: 31 PG (ref 26.8–34.3)
MCHC RBC AUTO-ENTMCNC: 33 G/DL (ref 31.4–37.4)
MCV RBC AUTO: 94 FL (ref 82–98)
MONOCYTES # BLD AUTO: 0.43 THOUSAND/ÂΜL (ref 0.17–1.22)
MONOCYTES NFR BLD AUTO: 9 % (ref 4–12)
NEUTROPHILS # BLD AUTO: 3.55 THOUSANDS/ÂΜL (ref 1.85–7.62)
NEUTS SEG NFR BLD AUTO: 79 % (ref 43–75)
NRBC BLD AUTO-RTO: 0 /100 WBCS
PLATELET # BLD AUTO: 138 THOUSANDS/UL (ref 149–390)
PMV BLD AUTO: 11 FL (ref 8.9–12.7)
POTASSIUM SERPL-SCNC: 3.4 MMOL/L (ref 3.5–5.3)
PROT SERPL-MCNC: 7 G/DL (ref 6.4–8.4)
RBC # BLD AUTO: 4.81 MILLION/UL (ref 3.88–5.62)
SODIUM SERPL-SCNC: 144 MMOL/L (ref 135–147)
T3FREE SERPL-MCNC: 2.84 PG/ML (ref 2.3–4.2)
TSH SERPL DL<=0.05 MIU/L-ACNC: 1.43 UIU/ML (ref 0.45–4.5)
WBC # BLD AUTO: 4.56 THOUSAND/UL (ref 4.31–10.16)

## 2023-02-01 ENCOUNTER — HOSPITAL ENCOUNTER (OUTPATIENT)
Dept: INFUSION CENTER | Facility: HOSPITAL | Age: 78
Discharge: HOME | End: 2023-02-01

## 2023-02-01 ENCOUNTER — TELEPHONE (OUTPATIENT)
Dept: HEMATOLOGY ONCOLOGY | Facility: HOSPITAL | Age: 78
End: 2023-02-01

## 2023-02-01 ENCOUNTER — OFFICE VISIT (OUTPATIENT)
Dept: HEMATOLOGY ONCOLOGY | Facility: HOSPITAL | Age: 78
End: 2023-02-01

## 2023-02-01 VITALS
SYSTOLIC BLOOD PRESSURE: 138 MMHG | HEART RATE: 94 BPM | TEMPERATURE: 96.2 F | RESPIRATION RATE: 16 BRPM | BODY MASS INDEX: 33.32 KG/M2 | WEIGHT: 246 LBS | HEIGHT: 72 IN | OXYGEN SATURATION: 99 % | DIASTOLIC BLOOD PRESSURE: 70 MMHG

## 2023-02-01 VITALS
DIASTOLIC BLOOD PRESSURE: 58 MMHG | BODY MASS INDEX: 33.29 KG/M2 | WEIGHT: 245.81 LBS | OXYGEN SATURATION: 97 % | RESPIRATION RATE: 16 BRPM | TEMPERATURE: 98.5 F | SYSTOLIC BLOOD PRESSURE: 128 MMHG | HEIGHT: 72 IN | HEART RATE: 70 BPM

## 2023-02-01 DIAGNOSIS — C79.51 NSCLC METASTATIC TO BONE (HCC): Primary | ICD-10-CM

## 2023-02-01 DIAGNOSIS — C34.90 NSCLC METASTATIC TO BONE (HCC): Primary | ICD-10-CM

## 2023-02-01 RX ORDER — SODIUM CHLORIDE 9 MG/ML
20 INJECTION, SOLUTION INTRAVENOUS ONCE
Status: COMPLETED | OUTPATIENT
Start: 2023-02-01 | End: 2023-02-01

## 2023-02-01 RX ORDER — SODIUM CHLORIDE 9 MG/ML
20 INJECTION, SOLUTION INTRAVENOUS ONCE
OUTPATIENT
Start: 2023-04-05

## 2023-02-01 RX ORDER — SODIUM CHLORIDE 9 MG/ML
20 INJECTION, SOLUTION INTRAVENOUS ONCE
OUTPATIENT
Start: 2023-04-26

## 2023-02-01 RX ORDER — SODIUM CHLORIDE 9 MG/ML
20 INJECTION, SOLUTION INTRAVENOUS ONCE
OUTPATIENT
Start: 2023-03-15

## 2023-02-01 RX ORDER — SODIUM CHLORIDE 9 MG/ML
20 INJECTION, SOLUTION INTRAVENOUS ONCE
OUTPATIENT
Start: 2023-02-22

## 2023-02-01 RX ADMIN — SODIUM CHLORIDE 200 MG: 9 INJECTION, SOLUTION INTRAVENOUS at 12:52

## 2023-02-01 RX ADMIN — SODIUM CHLORIDE 20 ML/HR: 9 INJECTION, SOLUTION INTRAVENOUS at 12:52

## 2023-02-01 NOTE — PROGRESS NOTES
Pt here for chemo tolerated well no adverse reactions declined AVS and left ambulatory with steady gait utilizing a cane

## 2023-02-01 NOTE — PROGRESS NOTES
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, PDL-1 20%    3/2018 - 2018 - carbo/alimta x 6    2018 - 2019 - maintenance alimta    2019 - pembrolizumab every 3 weeks, RT to T11 to L1          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 ([facility] [address] )   3/1/2018 -  Cancer Staged    Staging form: Lung, AJCC 8th Edition  - Clinical stage from 3/1/2018: Stage IVB (cTX, cN2, cM1c) - Signed by Emily Riley DO on 2022 Initial Diagnosis    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 ([facility] [address] )     2022 - 2022 Chemotherapy    pembrolizumab (KEYTRUDA) IVPB, 200 mg, Intravenous, Once, 10 of 13 cycles  Administration: 200 mg (2022), 200 mg (6/15/2022), 200 mg (2022), 200 mg (2022), 200 mg (2022), 200 mg (2022), 200 mg (2022), 200 mg (10/19/2022), 200 mg (2022), 200 mg (2022)     2022 - 2022 Chemotherapy    pembrolizumab (KEYTRUDA) IVPB, 400 mg, Intravenous, Once, 1 of 6 cycles     2022 -  Chemotherapy    pembrolizumab (KEYTRUDA) IVPB, 200 mg, Intravenous, Once, 2 of 10 cycles  Administration: 200 mg (2022), 200 mg (2023)         Interval History:  Liv Gurrola presents for a follow up for his stage IV NSCLC  Patient reports day after his last treatment, a rash developed in two areas, right upper arm and right thigh  The rash was raised, slightly painful and itchy  Patient states it started subsiding this week, upon assessment, those areas are slightly pink but not itchy  Denies abdominal pain or diarrhea           REVIEW OF SYSTEMS:  Please note that a 14-point review of systems was performed to include Constitutional, HEENT, Respiratory, CVS, GI, , Musculoskeletal, Integumentary, Neurologic, Rheumatologic, Endocrinologic, Psychiatric, Lymphatic, and Hematologic/Oncologic systems were reviewed and are negative unless otherwise stated in HPI  Positive and negative findings pertinent to this evaluation are incorporated into the history of present illness  ECOG PS:    PROBLEM LIST:  Patient Active Problem List   Diagnosis   • 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 [facility])   • Essential hypertension   • Hypokalemia   • Mixed hyperlipidemia   • Glaucoma of both eyes   • Type 2 diabetes mellitus with hyperglycemia, without long-term current use of insulin (HCC)   • Colostomy in place [facility])   • Dysequilibrium       Assessment / Plan:  Ruba Kent wanted to confirm we are going to continue treatment every 3 weeks as there was discussion to changing it to every 6 weeks  Due to the rashes and itchiness, which wakes him up at night, suggested we can either take a break from treatment, add Benadryl IV prior to treatment and take atarax before bedtime  Patient declined any additional supportive medications and would like to continue with treatment as ordered  Therefore, we will continue with pembrolizumab every 3 weeks  Plan is to repeat PET/CT in May  Ruba Kent will be seen back in the office in six weeks  I spent 20 minutes on chart review, face to face counseling time, coordination of care and documentation  Past Medical History:   has a past medical history of Diabetes mellitus ([address] ), High blood pressure, and High cholesterol  PAST SURGICAL HISTORY:   has a past surgical history that includes Elbow surgery (Left, 2004)      CURRENT MEDICATIONS  Current Outpatient Medications   Medication Sig Dispense Refill   • Calcium Carb-Cholecalciferol (CALCIUM 1000 + D PO) Take by mouth     • Cholecalciferol (Vitamin D3) 1 25 MG (19528 UT) CAPS Take by mouth     • Contour Next Test test strip Test blood sugar once daily 100 strip 3   • dorzolamide (TRUSOPT) 2 % ophthalmic solution      • dorzolamide-timolol (COSOPT) 22 3-6 8 MG/ML ophthalmic solution INSTILL 1 DROP INTO EACH EYE TWICE DAILY     • glipiZIDE (GLUCOTROL XL) 2 5 mg 24 hr tablet Take 1 tablet (2 5 mg total) by mouth daily 90 tablet 1   • hydrochlorothiazide (HYDRODIURIL) 25 mg tablet Take 1 tablet (25 mg total) by mouth daily 90 tablet 3   • lisinopril (ZESTRIL) 20 mg tablet Take 1 tablet (20 mg total) by mouth daily 90 tablet 3   • lovastatin (MEVACOR) 20 mg tablet Take 1 tablet (20 mg total) by mouth daily 90 tablet 1   • methocarbamol (ROBAXIN) 500 mg tablet Take 1 tablet (500 mg total) by mouth 3 (three) times a day 20 tablet 0   • methylPREDNISolone 4 MG tablet therapy pack Use as directed on package 1 each 0   • Microlet Lancets MISC Use to test glucose once a day 100 each 3   • neomycin-polymyxin-hydrocortisone (CORTISPORIN) otic solution Administer 4 drops to the right ear every 8 (eight) hours 10 mL 0   • potassium chloride (K-DUR,KLOR-CON) 20 mEq tablet Take 1 tablet by mouth once daily 90 tablet 0     No current facility-administered medications for this visit  Facility-Administered Medications Ordered in Other Visits   Medication Dose Route Frequency Provider Last Rate Last Admin   • alteplase (CATHFLO) injection 2 mg  2 mg Intracatheter Q2H PRN Abdoul Self, DO         [unfilled]    SOCIAL HISTORY:   reports that he quit smoking about 17 years ago  His smoking use included cigarettes  He started smoking about 66 years ago  He has a 50 00 pack-year smoking history  He has never used smokeless tobacco  He reports current alcohol use of about 21 0 standard drinks per week  He reports that he does not use drugs  FAMILY HISTORY:  family history includes Colon cancer in his father  ALLERGIES:  is allergic to aspirin and ibuprofen        Physical Exam:  Vital Signs:   Visit Vitals  /70 (BP Location: Left arm, Patient Position: Sitting, Cuff Size: Adult)   Pulse 94   Temp (!) 96 2 °F (35 7 °C) (Temporal)   Resp 16   Ht 6' (1 829 m)   Wt 112 kg (246 lb)   SpO2 99%   BMI 33 36 kg/m²   Smoking Status Former   BSA 2 33 m²     Body mass index is 33 36 kg/m²  Body surface area is 2 33 meters squared  GEN: Alert, awake oriented x3, in no acute distress  HEENT- No pallor, icterus, cyanosis, no oral mucosal lesions,   LAD - no palpable cervical, clavicle, axillary, inguinal LAD  Heart- normal S1 S2, regular rate and rhythm, No murmur, rubs     Lungs- clear breathing sound bilateral    Abdomen- soft, Non tender, bowel sounds present  Extremities- No cyanosis, clubbing, edema  Neuro- No focal neurological deficit    Labs:  Lab Results   Component Value Date    WBC 4 56 01/30/2023    HGB 14 9 01/30/2023    HCT 45 1 01/30/2023    MCV 94 01/30/2023     (L) 01/30/2023     Lab Results   Component Value Date    SODIUM 144 01/30/2023    K 3 4 (L) 01/30/2023     01/30/2023    CO2 28 01/30/2023    AGAP 8 01/30/2023    BUN 22 01/30/2023    CREATININE 0 94 01/30/2023    GLUC 188 (H) 01/30/2023    GLUF 188 (H) 01/30/2023    CALCIUM 8 6 01/30/2023    AST 30 01/30/2023    ALT 32 01/30/2023    ALKPHOS 93 01/30/2023    TP 7 0 01/30/2023    TBILI 0 40 01/30/2023    EGFR 77 01/30/2023

## 2023-02-01 NOTE — TELEPHONE ENCOUNTER
Patient seen in office today with Dr Becky Peters and Wilmer Null, [address]  No changes to treatment plan

## 2023-02-20 ENCOUNTER — HOSPITAL ENCOUNTER (OUTPATIENT)
Dept: INFUSION CENTER | Facility: HOSPITAL | Age: 78
Discharge: HOME | End: 2023-02-20

## 2023-02-20 DIAGNOSIS — C79.51 NSCLC METASTATIC TO BONE (HCC): ICD-10-CM

## 2023-02-20 DIAGNOSIS — C34.90 NSCLC METASTATIC TO BONE (HCC): ICD-10-CM

## 2023-02-20 LAB
ALBUMIN SERPL BCP-MCNC: 3.5 G/DL (ref 3.5–5)
ALP SERPL-CCNC: 82 U/L (ref 34–104)
ALT SERPL W P-5'-P-CCNC: 17 U/L (ref 7–52)
ANION GAP SERPL CALCULATED.3IONS-SCNC: 6 MMOL/L (ref 4–13)
AST SERPL W P-5'-P-CCNC: 20 U/L (ref 13–39)
BASOPHILS # BLD AUTO: 0.06 THOUSANDS/ÂΜL (ref 0–0.1)
BASOPHILS NFR BLD AUTO: 1 % (ref 0–1)
BILIRUB SERPL-MCNC: 0.8 MG/DL (ref 0.2–1)
BUN SERPL-MCNC: 18 MG/DL (ref 5–25)
CALCIUM SERPL-MCNC: 8.7 MG/DL (ref 8.4–10.2)
CHLORIDE SERPL-SCNC: 106 MMOL/L (ref 96–108)
CO2 SERPL-SCNC: 29 MMOL/L (ref 21–32)
CREAT SERPL-MCNC: 0.84 MG/DL (ref 0.6–1.3)
EOSINOPHIL # BLD AUTO: 0.15 THOUSAND/ÂΜL (ref 0–0.61)
EOSINOPHIL NFR BLD AUTO: 2 % (ref 0–6)
ERYTHROCYTE [DISTWIDTH] IN BLOOD BY AUTOMATED COUNT: 14.1 % (ref 11.6–15.1)
GFR SERPL CREATININE-BSD FRML MDRD: 83 ML/MIN/1.73SQ M
GLUCOSE SERPL-MCNC: 123 MG/DL (ref 65–140)
HCT VFR BLD AUTO: 45.2 % (ref 36.5–49.3)
HGB BLD-MCNC: 15.3 G/DL (ref 12–17)
IMM GRANULOCYTES # BLD AUTO: 0.02 THOUSAND/UL (ref 0–0.2)
IMM GRANULOCYTES NFR BLD AUTO: 0 % (ref 0–2)
LYMPHOCYTES # BLD AUTO: 0.56 THOUSANDS/ÂΜL (ref 0.6–4.47)
LYMPHOCYTES NFR BLD AUTO: 9 % (ref 14–44)
MCH RBC QN AUTO: 32.3 PG (ref 26.8–34.3)
MCHC RBC AUTO-ENTMCNC: 33.8 G/DL (ref 31.4–37.4)
MCV RBC AUTO: 95 FL (ref 82–98)
MONOCYTES # BLD AUTO: 0.72 THOUSAND/ÂΜL (ref 0.17–1.22)
MONOCYTES NFR BLD AUTO: 12 % (ref 4–12)
NEUTROPHILS # BLD AUTO: 4.67 THOUSANDS/ÂΜL (ref 1.85–7.62)
NEUTS SEG NFR BLD AUTO: 76 % (ref 43–75)
NRBC BLD AUTO-RTO: 0 /100 WBCS
PLATELET # BLD AUTO: 209 THOUSANDS/UL (ref 149–390)
PMV BLD AUTO: 11.2 FL (ref 8.9–12.7)
POTASSIUM SERPL-SCNC: 3.8 MMOL/L (ref 3.5–5.3)
PROT SERPL-MCNC: 7 G/DL (ref 6.4–8.4)
RBC # BLD AUTO: 4.74 MILLION/UL (ref 3.88–5.62)
SODIUM SERPL-SCNC: 141 MMOL/L (ref 135–147)
T3FREE SERPL-MCNC: 2.43 PG/ML (ref 2.3–4.2)
TSH SERPL DL<=0.05 MIU/L-ACNC: 1.39 UIU/ML (ref 0.45–4.5)
WBC # BLD AUTO: 6.18 THOUSAND/UL (ref 4.31–10.16)

## 2023-02-22 ENCOUNTER — OFFICE VISIT (OUTPATIENT)
Dept: FAMILY MEDICINE CLINIC | Facility: HOSPITAL | Age: 78
End: 2023-02-22

## 2023-02-22 ENCOUNTER — HOSPITAL ENCOUNTER (OUTPATIENT)
Dept: INFUSION CENTER | Facility: HOSPITAL | Age: 78
Discharge: HOME | End: 2023-02-22

## 2023-02-22 ENCOUNTER — TELEPHONE (OUTPATIENT)
Dept: HEMATOLOGY ONCOLOGY | Facility: HOSPITAL | Age: 78
End: 2023-02-22

## 2023-02-22 VITALS
DIASTOLIC BLOOD PRESSURE: 65 MMHG | HEIGHT: 72 IN | TEMPERATURE: 97.2 F | OXYGEN SATURATION: 97 % | BODY MASS INDEX: 33.18 KG/M2 | SYSTOLIC BLOOD PRESSURE: 134 MMHG | WEIGHT: 245 LBS | RESPIRATION RATE: 18 BRPM | HEART RATE: 77 BPM

## 2023-02-22 VITALS
HEART RATE: 86 BPM | OXYGEN SATURATION: 96 % | WEIGHT: 244 LBS | DIASTOLIC BLOOD PRESSURE: 70 MMHG | HEIGHT: 72 IN | BODY MASS INDEX: 33.05 KG/M2 | SYSTOLIC BLOOD PRESSURE: 122 MMHG | TEMPERATURE: 97 F

## 2023-02-22 DIAGNOSIS — C34.90 NSCLC METASTATIC TO BONE (HCC): ICD-10-CM

## 2023-02-22 DIAGNOSIS — I10 ESSENTIAL HYPERTENSION: ICD-10-CM

## 2023-02-22 DIAGNOSIS — E11.65 TYPE 2 DIABETES MELLITUS WITH HYPERGLYCEMIA, WITHOUT LONG-TERM CURRENT USE OF INSULIN (HCC): ICD-10-CM

## 2023-02-22 DIAGNOSIS — C79.51 NSCLC METASTATIC TO BONE (HCC): Primary | ICD-10-CM

## 2023-02-22 DIAGNOSIS — C34.90 NSCLC METASTATIC TO BONE (HCC): Primary | ICD-10-CM

## 2023-02-22 DIAGNOSIS — C79.51 NSCLC METASTATIC TO BONE (HCC): ICD-10-CM

## 2023-02-22 DIAGNOSIS — R60.0 UNILATERAL EDEMA OF LOWER EXTREMITY: Primary | ICD-10-CM

## 2023-02-22 RX ORDER — BUMETANIDE 1 MG/1
1 TABLET ORAL DAILY PRN
Qty: 30 TABLET | Refills: 1 | Status: SHIPPED | OUTPATIENT
Start: 2023-02-22

## 2023-02-22 RX ORDER — SODIUM CHLORIDE 9 MG/ML
20 INJECTION, SOLUTION INTRAVENOUS ONCE
Status: COMPLETED | OUTPATIENT
Start: 2023-02-22 | End: 2023-02-22

## 2023-02-22 RX ADMIN — SODIUM CHLORIDE 20 ML/HR: 9 INJECTION, SOLUTION INTRAVENOUS at 13:15

## 2023-02-22 RX ADMIN — SODIUM CHLORIDE 200 MG: 9 INJECTION, SOLUTION INTRAVENOUS at 13:42

## 2023-02-22 NOTE — ASSESSMENT & PLAN NOTE
Main concerns are for DVT with ca history or cellulitis    Will hold HCTZ and take Bumetanide daily til DVT ruled out

## 2023-02-22 NOTE — PROGRESS NOTES
Name: Rogerio Rascon      : 1945      MRN: [id number]  Encounter Provider: Esteban Cameron MD  Encounter Date: 2023   Encounter department: [facility] [address]  Unilateral edema of lower extremity  Assessment & Plan:  Main concerns are for DVT with ca history or cellulitis  Will hold HCTZ and take Bumetanide daily til DVT ruled out    Orders:  -     VAS lower limb venous duplex study, unilateral/limited; Future; Expected date: 2023  -     bumetanide (BUMEX) 1 mg tablet; Take 1 tablet (1 mg total) by mouth daily as needed (edema)    2  Type 2 diabetes mellitus with hyperglycemia, without long-term current use of insulin (HCC)    3  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 [facility])  Assessment & Plan:  Continue infusions      4   Essential hypertension  Assessment & Plan:  Excellent BP control           Subjective      Acute visit for swelling and redness of foot    Has swelling of L foot for past 10 days    Not painful, no history of injury    Hx of RLE edema did better with compression stockings    LLE doesn't reduce in size overnight    Is currently getting infusions for lung ca        Review of Systems    Current Outpatient Medications on File Prior to Visit   Medication Sig   • Calcium Carb-Cholecalciferol (CALCIUM 1000 + D PO) Take by mouth   • Cholecalciferol (Vitamin D3) 1 25 MG (72519 UT) CAPS Take by mouth   • Contour Next Test test strip Test blood sugar once daily   • dorzolamide (TRUSOPT) 2 % ophthalmic solution    • dorzolamide-timolol (COSOPT) 22 3-6 8 MG/ML ophthalmic solution INSTILL 1 DROP INTO EACH EYE TWICE DAILY   • glipiZIDE (GLUCOTROL XL) 2 5 mg 24 hr tablet Take 1 tablet (2 5 mg total) by mouth daily   • hydrochlorothiazide (HYDRODIURIL) 25 mg tablet Take 1 tablet (25 mg total) by mouth daily   • lisinopril (ZESTRIL) 20 mg tablet Take 1 tablet (20 mg total) by mouth daily   • lovastatin (MEVACOR) 20 mg tablet Take 1 tablet (20 mg total) by mouth daily   • methocarbamol (ROBAXIN) 500 mg tablet Take 1 tablet (500 mg total) by mouth 3 (three) times a day   • Microlet Lancets MISC Use to test glucose once a day   • potassium chloride (K-DUR,KLOR-CON) 20 mEq tablet Take 1 tablet by mouth once daily   • methylPREDNISolone 4 MG tablet therapy pack Use as directed on package (Patient not taking: Reported on 2023)   • neomycin-polymyxin-hydrocortisone (CORTISPORIN) otic solution Administer 4 drops to the right ear every 8 (eight) hours (Patient not taking: Reported on 2023)       Objective     /70 (BP Location: Left arm, Patient Position: Sitting, Cuff Size: Large)   Pulse 86   Temp (!) 97 °F (36 1 °C) (Tympanic)   Ht 6' (1 829 m)   Wt 111 kg (244 lb)   SpO2 96%   BMI 33 09 kg/m²     Physical Exam  Vitals and nursing note reviewed  Constitutional:       Appearance: Normal appearance  Musculoskeletal:      Right lower le+ Edema present  Left lower leg: Swelling present  No tenderness  2+ Edema present        Comments: Swelling to upper calf L with mild increase in warmth       Candace Yancey MD

## 2023-02-22 NOTE — TELEPHONE ENCOUNTER
Ketty Noyola has treatment scheduled for today 2/22 and per Dr Tamiko Thomas, is ok to treat while being ruled out for DVT

## 2023-02-23 ENCOUNTER — HOSPITAL ENCOUNTER (OUTPATIENT)
Dept: NON INVASIVE DIAGNOSTICS | Facility: HOSPITAL | Age: 78
Discharge: HOME | End: 2023-02-23

## 2023-02-23 DIAGNOSIS — I82.412 ACUTE DEEP VEIN THROMBOSIS (DVT) OF FEMORAL VEIN OF LEFT LOWER EXTREMITY (HCC): Primary | ICD-10-CM

## 2023-02-23 DIAGNOSIS — R60.0 UNILATERAL EDEMA OF LOWER EXTREMITY: ICD-10-CM

## 2023-02-24 ENCOUNTER — TELEPHONE (OUTPATIENT)
Dept: HEMATOLOGY ONCOLOGY | Facility: CLINIC | Age: 78
End: 2023-02-24

## 2023-02-24 DIAGNOSIS — C79.51 NSCLC METASTATIC TO BONE (HCC): Primary | ICD-10-CM

## 2023-02-24 DIAGNOSIS — C34.90 NSCLC METASTATIC TO BONE (HCC): Primary | ICD-10-CM

## 2023-02-24 NOTE — TELEPHONE ENCOUNTER
Spoke to Octavio to schedule a PET scan to make sure that the reason for his blood clot isnt because the cancer is spreading and getting worse  Patient agreeable  PET scheduled for 3/9 at 1130 am at [facility] [address]  Let him know nothing to eat/drink for 6 hours before the test  Patient verbalized understanding

## 2023-03-09 ENCOUNTER — HOSPITAL ENCOUNTER (OUTPATIENT)
Dept: RADIOLOGY | Age: 78
Discharge: HOME | End: 2023-03-09

## 2023-03-09 DIAGNOSIS — C34.90 NSCLC METASTATIC TO BONE (HCC): ICD-10-CM

## 2023-03-09 DIAGNOSIS — C79.51 NSCLC METASTATIC TO BONE (HCC): ICD-10-CM

## 2023-03-09 LAB — GLUCOSE SERPL-MCNC: 106 MG/DL (ref 65–140)

## 2023-03-13 ENCOUNTER — HOSPITAL ENCOUNTER (OUTPATIENT)
Dept: INFUSION CENTER | Facility: HOSPITAL | Age: 78
Discharge: HOME | End: 2023-03-13

## 2023-03-13 DIAGNOSIS — C34.90 NSCLC METASTATIC TO BONE (HCC): Primary | ICD-10-CM

## 2023-03-13 DIAGNOSIS — C79.51 NSCLC METASTATIC TO BONE (HCC): Primary | ICD-10-CM

## 2023-03-13 LAB
ALBUMIN SERPL BCP-MCNC: 3.6 G/DL (ref 3.5–5)
ALP SERPL-CCNC: 89 U/L (ref 34–104)
ALT SERPL W P-5'-P-CCNC: 24 U/L (ref 7–52)
ANION GAP SERPL CALCULATED.3IONS-SCNC: 5 MMOL/L (ref 4–13)
AST SERPL W P-5'-P-CCNC: 22 U/L (ref 13–39)
BASOPHILS # BLD AUTO: 0.03 THOUSANDS/ÂΜL (ref 0–0.1)
BASOPHILS NFR BLD AUTO: 1 % (ref 0–1)
BILIRUB SERPL-MCNC: 0.74 MG/DL (ref 0.2–1)
BUN SERPL-MCNC: 16 MG/DL (ref 5–25)
CALCIUM SERPL-MCNC: 8.8 MG/DL (ref 8.4–10.2)
CHLORIDE SERPL-SCNC: 104 MMOL/L (ref 96–108)
CO2 SERPL-SCNC: 30 MMOL/L (ref 21–32)
CREAT SERPL-MCNC: 0.81 MG/DL (ref 0.6–1.3)
EOSINOPHIL # BLD AUTO: 0.18 THOUSAND/ÂΜL (ref 0–0.61)
EOSINOPHIL NFR BLD AUTO: 4 % (ref 0–6)
ERYTHROCYTE [DISTWIDTH] IN BLOOD BY AUTOMATED COUNT: 14.3 % (ref 11.6–15.1)
GFR SERPL CREATININE-BSD FRML MDRD: 85 ML/MIN/1.73SQ M
GLUCOSE SERPL-MCNC: 154 MG/DL (ref 65–140)
HCT VFR BLD AUTO: 45.5 % (ref 36.5–49.3)
HGB BLD-MCNC: 15 G/DL (ref 12–17)
IMM GRANULOCYTES # BLD AUTO: 0.01 THOUSAND/UL (ref 0–0.2)
IMM GRANULOCYTES NFR BLD AUTO: 0 % (ref 0–2)
LYMPHOCYTES # BLD AUTO: 0.56 THOUSANDS/ÂΜL (ref 0.6–4.47)
LYMPHOCYTES NFR BLD AUTO: 12 % (ref 14–44)
MCH RBC QN AUTO: 31.2 PG (ref 26.8–34.3)
MCHC RBC AUTO-ENTMCNC: 33 G/DL (ref 31.4–37.4)
MCV RBC AUTO: 95 FL (ref 82–98)
MONOCYTES # BLD AUTO: 0.59 THOUSAND/ÂΜL (ref 0.17–1.22)
MONOCYTES NFR BLD AUTO: 13 % (ref 4–12)
NEUTROPHILS # BLD AUTO: 3.26 THOUSANDS/ÂΜL (ref 1.85–7.62)
NEUTS SEG NFR BLD AUTO: 70 % (ref 43–75)
NRBC BLD AUTO-RTO: 0 /100 WBCS
PLATELET # BLD AUTO: 184 THOUSANDS/UL (ref 149–390)
PMV BLD AUTO: 10.9 FL (ref 8.9–12.7)
POTASSIUM SERPL-SCNC: 3.6 MMOL/L (ref 3.5–5.3)
PROT SERPL-MCNC: 6.8 G/DL (ref 6.4–8.4)
RBC # BLD AUTO: 4.81 MILLION/UL (ref 3.88–5.62)
SODIUM SERPL-SCNC: 139 MMOL/L (ref 135–147)
T3FREE SERPL-MCNC: 2.67 PG/ML (ref 2.3–4.2)
TSH SERPL DL<=0.05 MIU/L-ACNC: 0.99 UIU/ML (ref 0.45–4.5)
WBC # BLD AUTO: 4.63 THOUSAND/UL (ref 4.31–10.16)

## 2023-03-15 ENCOUNTER — TELEPHONE (OUTPATIENT)
Age: 78
End: 2023-03-15

## 2023-03-15 ENCOUNTER — OFFICE VISIT (OUTPATIENT)
Age: 78
End: 2023-03-15

## 2023-03-15 ENCOUNTER — DOCUMENTATION (OUTPATIENT)
Dept: HEMATOLOGY ONCOLOGY | Facility: CLINIC | Age: 78
End: 2023-03-15

## 2023-03-15 ENCOUNTER — HOSPITAL ENCOUNTER (OUTPATIENT)
Dept: INFUSION CENTER | Facility: HOSPITAL | Age: 78
Discharge: HOME | End: 2023-03-15
Attending: INTERNAL MEDICINE

## 2023-03-15 VITALS
OXYGEN SATURATION: 96 % | BODY MASS INDEX: 33.22 KG/M2 | DIASTOLIC BLOOD PRESSURE: 76 MMHG | TEMPERATURE: 97.4 F | RESPIRATION RATE: 16 BRPM | SYSTOLIC BLOOD PRESSURE: 140 MMHG | HEART RATE: 64 BPM | HEIGHT: 72 IN

## 2023-03-15 VITALS
WEIGHT: 244 LBS | OXYGEN SATURATION: 92 % | HEART RATE: 67 BPM | BODY MASS INDEX: 33.05 KG/M2 | HEIGHT: 72 IN | TEMPERATURE: 97.8 F | RESPIRATION RATE: 14 BRPM | SYSTOLIC BLOOD PRESSURE: 106 MMHG | DIASTOLIC BLOOD PRESSURE: 66 MMHG

## 2023-03-15 DIAGNOSIS — C34.90 NSCLC METASTATIC TO BONE (HCC): Primary | ICD-10-CM

## 2023-03-15 DIAGNOSIS — C79.51 NSCLC METASTATIC TO BONE (HCC): Primary | ICD-10-CM

## 2023-03-15 DIAGNOSIS — I82.4Y2 ACUTE DEEP VEIN THROMBOSIS (DVT) OF PROXIMAL VEIN OF LEFT LOWER EXTREMITY (HCC): Primary | ICD-10-CM

## 2023-03-15 RX ORDER — SODIUM CHLORIDE 9 MG/ML
20 INJECTION, SOLUTION INTRAVENOUS ONCE
Status: COMPLETED | OUTPATIENT
Start: 2023-03-15 | End: 2023-03-15

## 2023-03-15 RX ADMIN — SODIUM CHLORIDE 20 ML/HR: 9 INJECTION, SOLUTION INTRAVENOUS at 12:41

## 2023-03-15 RX ADMIN — SODIUM CHLORIDE 200 MG: 9 INJECTION, SOLUTION INTRAVENOUS at 12:41

## 2023-03-15 NOTE — TELEPHONE ENCOUNTER
Please schedule labs on Mondays & treatments on Wednesdays 4/26 to be scheduled after follow up at 2pm  Thanks!

## 2023-03-15 NOTE — TELEPHONE ENCOUNTER
Patient left a message requesting a call back  I spoke to the wife  She said the copay card did not work for patient's eliquis because he has medicare  He has 28 days worth of samples  I let her know that we have reached out to our finance department for assistance and if he would need any samples in the meantime, to let us know  Wife verbalized understanding

## 2023-03-15 NOTE — PROGRESS NOTES
Pt tolerated chemotherapy infusion without any adverse reactions  Port flushed and de-accessed  Pt ambulated out of unit with a steady gait  AVS printed and given to pt

## 2023-03-16 ENCOUNTER — TELEPHONE (OUTPATIENT)
Age: 78
End: 2023-03-16

## 2023-03-16 DIAGNOSIS — C34.90 NSCLC METASTATIC TO BONE (HCC): Primary | ICD-10-CM

## 2023-03-16 DIAGNOSIS — C79.51 NSCLC METASTATIC TO BONE (HCC): Primary | ICD-10-CM

## 2023-03-16 NOTE — TELEPHONE ENCOUNTER
Called and left message on patient's voicemail to let him know that we moved his appt on 4/24 to 1240 (from 2pm) to allow time for him to get his infusion right after       Per [facility] infusion center, he needs to be at the infusion center by 230pm

## 2023-03-16 NOTE — PROGRESS NOTES
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 ([facility] [address] )   3/1/2018 -  Cancer Staged    Staging form: Lung, AJCC 8th Edition  - Clinical stage from 3/1/2018: Stage IVB (cTX, cN2, cM1c) - Signed by Brenda Rodriguez DO on 2022 Initial Diagnosis    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 ([facility] [address] )     2022 - 2022 Chemotherapy    pembrolizumab (KEYTRUDA) IVPB, 200 mg, Intravenous, Once, 10 of 13 cycles  Administration: 200 mg (2022), 200 mg (6/15/2022), 200 mg (2022), 200 mg (2022), 200 mg (2022), 200 mg (2022), 200 mg (2022), 200 mg (10/19/2022), 200 mg (2022), 200 mg (2022)     2022 - 2022 Chemotherapy    pembrolizumab (KEYTRUDA) IVPB, 400 mg, Intravenous, Once, 1 of 6 cycles     2022 -  Chemotherapy    pembrolizumab (KEYTRUDA) IVPB, 200 mg, Intravenous, Once, 5 of 10 cycles  Administration: 200 mg (2022), 200 mg (2023), 200 mg (2023), 200 mg (2023), 200 mg (3/15/2023)         Interval History:         REVIEW OF SYSTEMS:  Please note that a 14-point review of systems was performed to include Constitutional, HEENT, Respiratory, CVS, GI, , Musculoskeletal, Integumentary, Neurologic, Rheumatologic, Endocrinologic, Psychiatric, Lymphatic, and Hematologic/Oncologic systems were reviewed and are negative unless otherwise stated in HPI  Positive and negative findings pertinent to this evaluation are incorporated into the history of present illness        ECOG PS: 1    PROBLEM LIST:  Patient Active Problem List   Diagnosis   • 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 [facility])   • Essential hypertension   • Hypokalemia   • Mixed hyperlipidemia   • Glaucoma of both eyes   • Type 2 diabetes mellitus with hyperglycemia, without long-term current use of insulin (HCC)   • Colostomy in place [facility])   • Dysequilibrium   • Unilateral edema of lower extremity       Assessment / Plan:        I spent 40 minutes on chart review, face to face counseling time, coordination of care and documentation  Past Medical History:   has a past medical history of Diabetes mellitus ([address] ), High blood pressure, and High cholesterol  PAST SURGICAL HISTORY:   has a past surgical history that includes Elbow surgery (Left, 2004)      CURRENT MEDICATIONS  Current Outpatient Medications   Medication Sig Dispense Refill   • apixaban (Eliquis) 5 mg Take 1 tablet (5 mg total) by mouth 2 (two) times a day 60 tablet 11   • Calcium Carb-Cholecalciferol (CALCIUM 1000 + D PO) Take by mouth     • Cholecalciferol (Vitamin D3) 1 25 MG (58297 UT) CAPS Take by mouth     • Contour Next Test test strip Test blood sugar once daily 100 strip 3   • dorzolamide (TRUSOPT) 2 % ophthalmic solution      • dorzolamide-timolol (COSOPT) 22 3-6 8 MG/ML ophthalmic solution INSTILL 1 DROP INTO EACH EYE TWICE DAILY     • glipiZIDE (GLUCOTROL XL) 2 5 mg 24 hr tablet Take 1 tablet (2 5 mg total) by mouth daily 90 tablet 1   • hydrochlorothiazide (HYDRODIURIL) 25 mg tablet Take 1 tablet (25 mg total) by mouth daily 90 tablet 3   • lisinopril (ZESTRIL) 20 mg tablet Take 1 tablet (20 mg total) by mouth daily 90 tablet 3   • lovastatin (MEVACOR) 20 mg tablet Take 1 tablet (20 mg total) by mouth daily 90 tablet 1   • methocarbamol (ROBAXIN) 500 mg tablet Take 1 tablet (500 mg total) by mouth 3 (three) times a day 20 tablet 0   • Microlet Lancets MISC Use to test glucose once a day 100 each 3   • potassium chloride (K-DUR,KLOR-CON) 20 mEq tablet Take 1 tablet by mouth once daily 90 tablet 0   • bumetanide (BUMEX) 1 mg tablet Take 1 tablet (1 mg total) by mouth daily as needed (edema) (Patient not taking: Reported on 3/15/2023) 30 tablet 1   • methylPREDNISolone 4 MG tablet therapy pack Use as directed on package (Patient not taking: Reported on 2/22/2023) 1 each 0   • neomycin-polymyxin-hydrocortisone (CORTISPORIN) otic solution Administer 4 drops to the right ear every 8 (eight) hours (Patient not taking: Reported on 2/22/2023) 10 mL 0     No current facility-administered medications for this visit  Facility-Administered Medications Ordered in Other Visits   Medication Dose Route Frequency Provider Last Rate Last Admin   • alteplase (CATHFLO) injection 2 mg  2 mg Intracatheter Q2H PRN Thierry Garsia DO         [unfilled]    SOCIAL HISTORY:   reports that he quit smoking about 17 years ago  His smoking use included cigarettes  He started smoking about 66 years ago  He has a 50 00 pack-year smoking history  He has never used smokeless tobacco  He reports current alcohol use of about 21 0 standard drinks per week  He reports that he does not use drugs  FAMILY HISTORY:  family history includes Colon cancer in his father  ALLERGIES:  is allergic to aspirin and ibuprofen  Physical Exam:  Vital Signs:   Visit Vitals  /66 (BP Location: Right arm, Patient Position: Sitting, Cuff Size: Adult)   Pulse 67   Temp 97 8 °F (36 6 °C) (Temporal)   Resp 14   Ht 6' (1 829 m)   Wt 111 kg (244 lb)   SpO2 92%   BMI 33 09 kg/m²   Smoking Status Former   BSA 2 32 m²     Body mass index is 33 09 kg/m²  Body surface area is 2 32 meters squared  GEN: Alert, awake oriented x3, in no acute distress  HEENT- No pallor, icterus, cyanosis, no oral mucosal lesions,   LAD - no palpable cervical, clavicle, axillary, inguinal LAD  Heart- normal S1 S2, regular rate and rhythm, No murmur, rubs     Lungs- clear breathing sound bilateral    Abdomen- soft, Non tender, bowel sounds present  Extremities- No cyanosis, clubbing, edema  Neuro- No focal neurological deficit    Labs:  Lab Results   Component Value Date    WBC 4 63 03/13/2023    HGB 15 0 03/13/2023    HCT 45 5 03/13/2023    MCV 95 03/13/2023     03/13/2023     Lab Results   Component Value Date    SODIUM 139 03/13/2023    K 3 6 03/13/2023     03/13/2023    CO2 30 03/13/2023    AGAP 5 03/13/2023    BUN 16 03/13/2023    CREATININE 0 81 03/13/2023    GLUC 154 (H) 03/13/2023    GLUF 188 (H) 01/30/2023    CALCIUM 8 8 03/13/2023    AST 22 03/13/2023    ALT 24 03/13/2023    ALKPHOS 89 03/13/2023    TP 6 8 03/13/2023    TBILI 0 74 03/13/2023    EGFR 85 03/13/2023

## 2023-03-17 NOTE — TELEPHONE ENCOUNTER
Patient called back and left a message on the voicemail letting us know that this new appt time is fine

## 2023-03-22 NOTE — PROGRESS NOTES
Rcvd email from providers office regarding patients concerns with cost of Eliquis  Obtained BMS Free trial card call to [facility] [address]LEEN LAM to provide information  ID# [id number]  BIN# [id number]  [facility]# [id number]  [id number]  FREE    Completed BMS free drug application forwarded to providers office for signature  Call to patient to discuss free drug application process/ timeframe for processing/ required documents    3-23  Spoke with Mr /Mrs Funmilayo Landis they are working on getting documents together and will forward to me via email  Mr Funmilayo Landis was able to get the first 30 days for free    3-28  Patient documents received // All signatures & Documents on file application faxed    6-65  Patient is Christy Meza  [id number]     Rcvd notice from [facility] PAP Patient has been approved for free drug program  Eff 3-31-23 thru 12-31-23     Ary:   Nicole Calle  will dispense medication  Please send script to them with refills       Please notify patient of approval

## 2023-03-31 DIAGNOSIS — I82.4Y2 ACUTE DEEP VEIN THROMBOSIS (DVT) OF PROXIMAL VEIN OF LEFT LOWER EXTREMITY (HCC): ICD-10-CM

## 2023-03-31 NOTE — TELEPHONE ENCOUNTER
Ramin approved for free drug program for eliquis through 12/31/23  Left detailed message on patient's voicemail letting him know he was approved and he will be getting it by mail

## 2023-04-03 ENCOUNTER — HOSPITAL ENCOUNTER (OUTPATIENT)
Dept: INFUSION CENTER | Facility: HOSPITAL | Age: 78
Discharge: HOME | End: 2023-04-03

## 2023-04-03 DIAGNOSIS — C34.90 NSCLC METASTATIC TO BONE (HCC): Primary | ICD-10-CM

## 2023-04-03 DIAGNOSIS — C79.51 NSCLC METASTATIC TO BONE (HCC): Primary | ICD-10-CM

## 2023-04-03 LAB
ALBUMIN SERPL BCP-MCNC: 3.5 G/DL (ref 3.5–5)
ALP SERPL-CCNC: 77 U/L (ref 34–104)
ALT SERPL W P-5'-P-CCNC: 18 U/L (ref 7–52)
ANION GAP SERPL CALCULATED.3IONS-SCNC: 10 MMOL/L (ref 4–13)
AST SERPL W P-5'-P-CCNC: 22 U/L (ref 13–39)
BASOPHILS # BLD AUTO: 0.03 THOUSANDS/ÂΜL (ref 0–0.1)
BASOPHILS NFR BLD AUTO: 1 % (ref 0–1)
BILIRUB SERPL-MCNC: 0.9 MG/DL (ref 0.2–1)
BUN SERPL-MCNC: 17 MG/DL (ref 5–25)
CALCIUM SERPL-MCNC: 9.2 MG/DL (ref 8.4–10.2)
CHLORIDE SERPL-SCNC: 103 MMOL/L (ref 96–108)
CO2 SERPL-SCNC: 25 MMOL/L (ref 21–32)
CREAT SERPL-MCNC: 0.9 MG/DL (ref 0.6–1.3)
EOSINOPHIL # BLD AUTO: 0.23 THOUSAND/ÂΜL (ref 0–0.61)
EOSINOPHIL NFR BLD AUTO: 6 % (ref 0–6)
ERYTHROCYTE [DISTWIDTH] IN BLOOD BY AUTOMATED COUNT: 14.2 % (ref 11.6–15.1)
GFR SERPL CREATININE-BSD FRML MDRD: 80 ML/MIN/1.73SQ M
GLUCOSE SERPL-MCNC: 242 MG/DL (ref 65–140)
HCT VFR BLD AUTO: 44.2 % (ref 36.5–49.3)
HGB BLD-MCNC: 14.7 G/DL (ref 12–17)
IMM GRANULOCYTES # BLD AUTO: 0.01 THOUSAND/UL (ref 0–0.2)
IMM GRANULOCYTES NFR BLD AUTO: 0 % (ref 0–2)
LYMPHOCYTES # BLD AUTO: 0.34 THOUSANDS/ÂΜL (ref 0.6–4.47)
LYMPHOCYTES NFR BLD AUTO: 9 % (ref 14–44)
MCH RBC QN AUTO: 31.1 PG (ref 26.8–34.3)
MCHC RBC AUTO-ENTMCNC: 33.3 G/DL (ref 31.4–37.4)
MCV RBC AUTO: 94 FL (ref 82–98)
MONOCYTES # BLD AUTO: 0.36 THOUSAND/ÂΜL (ref 0.17–1.22)
MONOCYTES NFR BLD AUTO: 10 % (ref 4–12)
NEUTROPHILS # BLD AUTO: 2.81 THOUSANDS/ÂΜL (ref 1.85–7.62)
NEUTS SEG NFR BLD AUTO: 74 % (ref 43–75)
NRBC BLD AUTO-RTO: 0 /100 WBCS
PLATELET # BLD AUTO: 160 THOUSANDS/UL (ref 149–390)
PMV BLD AUTO: 11 FL (ref 8.9–12.7)
POTASSIUM SERPL-SCNC: 3.8 MMOL/L (ref 3.5–5.3)
PROT SERPL-MCNC: 6.9 G/DL (ref 6.4–8.4)
RBC # BLD AUTO: 4.72 MILLION/UL (ref 3.88–5.62)
SODIUM SERPL-SCNC: 138 MMOL/L (ref 135–147)
T3FREE SERPL-MCNC: 2.68 PG/ML (ref 2.3–4.2)
TSH SERPL DL<=0.05 MIU/L-ACNC: 1.53 UIU/ML (ref 0.45–4.5)
WBC # BLD AUTO: 3.78 THOUSAND/UL (ref 4.31–10.16)

## 2023-04-05 ENCOUNTER — HOSPITAL ENCOUNTER (OUTPATIENT)
Dept: INFUSION CENTER | Facility: HOSPITAL | Age: 78
Discharge: HOME | End: 2023-04-05
Attending: INTERNAL MEDICINE

## 2023-04-05 VITALS
RESPIRATION RATE: 16 BRPM | SYSTOLIC BLOOD PRESSURE: 115 MMHG | BODY MASS INDEX: 33 KG/M2 | OXYGEN SATURATION: 95 % | DIASTOLIC BLOOD PRESSURE: 66 MMHG | HEART RATE: 72 BPM | HEIGHT: 72 IN | TEMPERATURE: 98 F | WEIGHT: 243.61 LBS

## 2023-04-05 DIAGNOSIS — C79.51 NSCLC METASTATIC TO BONE (HCC): Primary | ICD-10-CM

## 2023-04-05 DIAGNOSIS — C34.90 NSCLC METASTATIC TO BONE (HCC): Primary | ICD-10-CM

## 2023-04-05 RX ORDER — SODIUM CHLORIDE 9 MG/ML
20 INJECTION, SOLUTION INTRAVENOUS ONCE
Status: COMPLETED | OUTPATIENT
Start: 2023-04-05 | End: 2023-04-05

## 2023-04-05 RX ADMIN — SODIUM CHLORIDE 200 MG: 9 INJECTION, SOLUTION INTRAVENOUS at 14:39

## 2023-04-05 RX ADMIN — SODIUM CHLORIDE 20 ML/HR: 9 INJECTION, SOLUTION INTRAVENOUS at 14:30

## 2023-04-05 NOTE — PROGRESS NOTES
Pt tolerated tx well without difficulty  Port de-accessed and band aid applied  Next appt scheduled  Pt left ambulatory with steady gait for d/c

## 2023-04-18 ENCOUNTER — OFFICE VISIT (OUTPATIENT)
Dept: FAMILY MEDICINE CLINIC | Facility: HOSPITAL | Age: 78
End: 2023-04-18

## 2023-04-18 VITALS
HEART RATE: 80 BPM | BODY MASS INDEX: 33.18 KG/M2 | TEMPERATURE: 97 F | HEIGHT: 72 IN | OXYGEN SATURATION: 96 % | WEIGHT: 245 LBS | SYSTOLIC BLOOD PRESSURE: 122 MMHG | DIASTOLIC BLOOD PRESSURE: 63 MMHG

## 2023-04-18 DIAGNOSIS — E11.65 TYPE 2 DIABETES MELLITUS WITH HYPERGLYCEMIA, WITHOUT LONG-TERM CURRENT USE OF INSULIN (HCC): ICD-10-CM

## 2023-04-18 DIAGNOSIS — R60.0 UNILATERAL EDEMA OF LOWER EXTREMITY: ICD-10-CM

## 2023-04-18 DIAGNOSIS — R42 DYSEQUILIBRIUM: ICD-10-CM

## 2023-04-18 DIAGNOSIS — I82.412 DVT OF DEEP FEMORAL VEIN, LEFT (HCC): Primary | ICD-10-CM

## 2023-04-18 DIAGNOSIS — Z93.3 COLOSTOMY IN PLACE (HCC): ICD-10-CM

## 2023-04-18 DIAGNOSIS — L73.9 FOLLICULITIS: ICD-10-CM

## 2023-04-18 LAB — SL AMB POCT HEMOGLOBIN AIC: 5.8 (ref ?–6.5)

## 2023-04-18 NOTE — PROGRESS NOTES
Name: Misti Fulton      : 1945      MRN: [id number]  Encounter Provider: Viry Arceo MD  Encounter Date: 2023   Encounter department: [facility] [address]     1  DVT of deep femoral vein, left ([place] [address] )    2  Type 2 diabetes mellitus with hyperglycemia, without long-term current use of insulin (HCC)  -     Microalbumin / creatinine urine ratio  -     POCT hemoglobin A1c    3  Folliculitis  -     mupirocin (BACTROBAN) 2 % ointment; Apply topically 3 (three) times a day    4  Colostomy in place ([place] [address] )    5  Unilateral edema of lower extremity    6  Dysequilibrium      BMI Counseling: Body mass index is 33 23 kg/m²  The BMI is above normal  Nutrition recommendations include decreasing portion sizes, encouraging healthy choices of fruits and vegetables, limiting drinks that contain sugar and moderation in carbohydrate intake  Exercise recommendations include exercising 3-5 times per week  No pharmacotherapy was ordered  Rationale for BMI follow-up plan is due to patient being overweight or obese  Subjective     6 month follow up    Continues with infusions and visits with Dr Wynn Herb    Has dermatitis on chest that responds to Mupirocin    [facility] with Eliquis [facility] is getting progressively worse, has to have a cane  Feels it is coming from his position sense when on his feet, gets a feeling of being thrown back,       Glucometers are in the range of 100's to low 200        Review of Systems   Constitutional: Negative  HENT: Negative  Respiratory: Negative  Genitourinary: Positive for frequency  Hematological: Negative  Psychiatric/Behavioral: Negative  All other systems reviewed and are negative        Past Medical History:   Diagnosis Date    Diabetes mellitus ([place] [address] )     High blood pressure     High cholesterol      Past Surgical History:   Procedure Laterality Date    ELBOW SURGERY Left     pinched nerve     Family History   Problem Relation Age of Onset    Colon cancer Father      Social History     Socioeconomic History    Marital status: /Civil Union     Spouse name: None    Number of children: None    Years of education: None    Highest education level: None   Occupational History    None   Tobacco Use    Smoking status: Former     Packs/day: 1 00     Years: 50 00     Pack years: 50 00     Types: Cigarettes     Start date: 35     Quit date: 2006     Years since quittin 3    Smokeless tobacco: Never   Vaping Use    Vaping Use: Never used   Substance and Sexual Activity    Alcohol use: Yes     Alcohol/week: 21 0 standard drinks     Types: 21 Glasses of wine per week     Comment: social drinker    Drug use: Never    Sexual activity: Not Currently     Partners: Female   Other Topics Concern    None   Social History Narrative    None     Social Determinants of Health     Financial Resource Strain: Low Risk     Difficulty of Paying Living Expenses: Not hard at all   Food Insecurity: Not on file   Transportation Needs: No Transportation Needs    Lack of Transportation (Medical): No    Lack of Transportation (Non-Medical):  No   Physical Activity: Not on file   Stress: Not on file   Social Connections: Not on file   Intimate Partner Violence: Not on file   Housing Stability: Not on file     Current Outpatient Medications on File Prior to Visit   Medication Sig    apixaban (Eliquis) 5 mg Take 1 tablet (5 mg total) by mouth 2 (two) times a day    Calcium Carb-Cholecalciferol (CALCIUM 1000 + D PO) Take by mouth    Cholecalciferol (Vitamin D3) 1 25 MG (05448 UT) CAPS Take by mouth    Contour Next Test test strip Test blood sugar once daily    dorzolamide (TRUSOPT) 2 % ophthalmic solution     dorzolamide-timolol (COSOPT) 22 3-6 8 MG/ML ophthalmic solution INSTILL 1 DROP INTO EACH EYE TWICE DAILY    hydrochlorothiazide (HYDRODIURIL) 25 mg tablet Take 1 tablet (25 mg total) by mouth daily    lisinopril (ZESTRIL) 20 mg tablet Take 1 tablet (20 mg total) by mouth daily    methocarbamol (ROBAXIN) 500 mg tablet Take 1 tablet (500 mg total) by mouth 3 (three) times a day    Microlet Lancets MISC Use to test glucose once a day    potassium chloride (K-DUR,KLOR-CON) 20 mEq tablet TAKE 1  BY MOUTH ONCE DAILY    bumetanide (BUMEX) 1 mg tablet Take 1 tablet (1 mg total) by mouth daily as needed (edema) (Patient not taking: Reported on 3/15/2023)    methylPREDNISolone 4 MG tablet therapy pack Use as directed on package (Patient not taking: Reported on 2/22/2023)    neomycin-polymyxin-hydrocortisone (CORTISPORIN) otic solution Administer 4 drops to the right ear every 8 (eight) hours (Patient not taking: Reported on 2/22/2023)     Allergies   Allergen Reactions    Aspirin Hives    Ibuprofen Hives     Immunization History   Administered Date(s) Administered    COVID-19 MODERNA VACC 0 5 ML IM 01/30/2021, 02/27/2021, 01/04/2022    Influenza, high dose seasonal 0 7 mL 10/17/2022       Objective     /63 (BP Location: Left arm, Patient Position: Sitting, Cuff Size: Large)   Pulse 80   Temp (!) 97 °F (36 1 °C) (Tympanic)   Ht 6' (1 829 m)   Wt 111 kg (245 lb)   SpO2 96%   BMI 33 23 kg/m²     Physical Exam  Vitals and nursing note reviewed  Neck:      Vascular: No carotid bruit  Cardiovascular:      Rate and Rhythm: Regular rhythm  Pulses: Normal pulses  Heart sounds: Normal heart sounds  Pulmonary:      Effort: Pulmonary effort is normal       Breath sounds: Normal breath sounds  Neurological:      Mental Status: He is alert and oriented to person, place, and time     Psychiatric:         Mood and Affect: Mood normal        Carine Figueroa MD

## 2023-04-20 LAB
CREAT UR-MCNC: 86.3 MG/DL
MICROALBUMIN UR-MCNC: 13.9 MG/L (ref 0–20)
MICROALBUMIN/CREAT 24H UR: 16 MG/G CREATININE (ref 0–30)

## 2023-04-24 ENCOUNTER — HOSPITAL ENCOUNTER (OUTPATIENT)
Dept: INFUSION CENTER | Facility: HOSPITAL | Age: 78
Discharge: HOME | End: 2023-04-24

## 2023-04-24 DIAGNOSIS — C79.51 NSCLC METASTATIC TO BONE (HCC): Primary | ICD-10-CM

## 2023-04-24 DIAGNOSIS — C34.90 NSCLC METASTATIC TO BONE (HCC): Primary | ICD-10-CM

## 2023-04-24 LAB
ALBUMIN SERPL BCP-MCNC: 3.6 G/DL (ref 3.5–5)
ALP SERPL-CCNC: 79 U/L (ref 34–104)
ALT SERPL W P-5'-P-CCNC: 20 U/L (ref 7–52)
ANION GAP SERPL CALCULATED.3IONS-SCNC: 5 MMOL/L (ref 4–13)
AST SERPL W P-5'-P-CCNC: 24 U/L (ref 13–39)
BASOPHILS # BLD AUTO: 0.06 THOUSANDS/ΜL (ref 0–0.1)
BASOPHILS NFR BLD AUTO: 1 % (ref 0–1)
BILIRUB SERPL-MCNC: 0.74 MG/DL (ref 0.2–1)
BUN SERPL-MCNC: 16 MG/DL (ref 5–25)
CALCIUM SERPL-MCNC: 9 MG/DL (ref 8.4–10.2)
CHLORIDE SERPL-SCNC: 107 MMOL/L (ref 96–108)
CO2 SERPL-SCNC: 30 MMOL/L (ref 21–32)
CREAT SERPL-MCNC: 0.71 MG/DL (ref 0.6–1.3)
EOSINOPHIL # BLD AUTO: 0.15 THOUSAND/ΜL (ref 0–0.61)
EOSINOPHIL NFR BLD AUTO: 4 % (ref 0–6)
ERYTHROCYTE [DISTWIDTH] IN BLOOD BY AUTOMATED COUNT: 14.5 % (ref 11.6–15.1)
GFR SERPL CREATININE-BSD FRML MDRD: 89 ML/MIN/1.73SQ M
GLUCOSE SERPL-MCNC: 106 MG/DL (ref 65–140)
HCT VFR BLD AUTO: 44.9 % (ref 36.5–49.3)
HGB BLD-MCNC: 15.1 G/DL (ref 12–17)
IMM GRANULOCYTES # BLD AUTO: 0.01 THOUSAND/UL (ref 0–0.2)
IMM GRANULOCYTES NFR BLD AUTO: 0 % (ref 0–2)
LYMPHOCYTES # BLD AUTO: 0.52 THOUSANDS/ΜL (ref 0.6–4.47)
LYMPHOCYTES NFR BLD AUTO: 12 % (ref 14–44)
MCH RBC QN AUTO: 31.7 PG (ref 26.8–34.3)
MCHC RBC AUTO-ENTMCNC: 33.6 G/DL (ref 31.4–37.4)
MCV RBC AUTO: 94 FL (ref 82–98)
MONOCYTES # BLD AUTO: 0.6 THOUSAND/ΜL (ref 0.17–1.22)
MONOCYTES NFR BLD AUTO: 14 % (ref 4–12)
NEUTROPHILS # BLD AUTO: 2.92 THOUSANDS/ΜL (ref 1.85–7.62)
NEUTS SEG NFR BLD AUTO: 69 % (ref 43–75)
NRBC BLD AUTO-RTO: 0 /100 WBCS
PLATELET # BLD AUTO: 186 THOUSANDS/UL (ref 149–390)
PMV BLD AUTO: 11.1 FL (ref 8.9–12.7)
POTASSIUM SERPL-SCNC: 3.6 MMOL/L (ref 3.5–5.3)
PROT SERPL-MCNC: 6.8 G/DL (ref 6.4–8.4)
RBC # BLD AUTO: 4.76 MILLION/UL (ref 3.88–5.62)
SODIUM SERPL-SCNC: 142 MMOL/L (ref 135–147)
TSH SERPL DL<=0.05 MIU/L-ACNC: 1.01 UIU/ML (ref 0.45–4.5)
WBC # BLD AUTO: 4.26 THOUSAND/UL (ref 4.31–10.16)

## 2023-04-24 NOTE — PROGRESS NOTES
Pt here for port labs; labs obtained without difficulty; pt aware of next appt and left unit amb with steady gait

## 2023-04-25 DIAGNOSIS — E11.65 TYPE 2 DIABETES MELLITUS WITH HYPERGLYCEMIA, WITHOUT LONG-TERM CURRENT USE OF INSULIN (HCC): ICD-10-CM

## 2023-04-25 DIAGNOSIS — E78.2 MIXED HYPERLIPIDEMIA: ICD-10-CM

## 2023-04-25 LAB — T3FREE SERPL-MCNC: 2.43 PG/ML (ref 2.3–4.2)

## 2023-04-25 RX ORDER — LOVASTATIN 20 MG/1
TABLET ORAL
Qty: 90 TABLET | Refills: 0 | Status: SHIPPED | OUTPATIENT
Start: 2023-04-25

## 2023-04-25 RX ORDER — GLIPIZIDE 2.5 MG/1
TABLET, EXTENDED RELEASE ORAL
Qty: 90 TABLET | Refills: 0 | Status: SHIPPED | OUTPATIENT
Start: 2023-04-25

## 2023-04-26 ENCOUNTER — HOSPITAL ENCOUNTER (OUTPATIENT)
Dept: INFUSION CENTER | Facility: HOSPITAL | Age: 78
Discharge: HOME | End: 2023-04-26
Attending: INTERNAL MEDICINE

## 2023-04-26 ENCOUNTER — OFFICE VISIT (OUTPATIENT)
Age: 78
End: 2023-04-26

## 2023-04-26 VITALS
SYSTOLIC BLOOD PRESSURE: 136 MMHG | RESPIRATION RATE: 16 BRPM | TEMPERATURE: 97.1 F | HEIGHT: 72 IN | BODY MASS INDEX: 33.03 KG/M2 | HEART RATE: 72 BPM | DIASTOLIC BLOOD PRESSURE: 82 MMHG | WEIGHT: 243.83 LBS | OXYGEN SATURATION: 100 %

## 2023-04-26 VITALS
RESPIRATION RATE: 18 BRPM | SYSTOLIC BLOOD PRESSURE: 130 MMHG | OXYGEN SATURATION: 96 % | BODY MASS INDEX: 33.18 KG/M2 | DIASTOLIC BLOOD PRESSURE: 60 MMHG | WEIGHT: 245 LBS | TEMPERATURE: 97 F | HEART RATE: 75 BPM | HEIGHT: 72 IN

## 2023-04-26 DIAGNOSIS — C34.90 NSCLC METASTATIC TO BONE (HCC): Primary | ICD-10-CM

## 2023-04-26 DIAGNOSIS — C79.51 NSCLC METASTATIC TO BONE (HCC): Primary | ICD-10-CM

## 2023-04-26 RX ORDER — SODIUM CHLORIDE 9 MG/ML
20 INJECTION, SOLUTION INTRAVENOUS ONCE
Status: COMPLETED | OUTPATIENT
Start: 2023-04-26 | End: 2023-04-26

## 2023-04-26 RX ORDER — SODIUM CHLORIDE 9 MG/ML
20 INJECTION, SOLUTION INTRAVENOUS ONCE
OUTPATIENT
Start: 2023-06-07

## 2023-04-26 RX ORDER — SODIUM CHLORIDE 9 MG/ML
20 INJECTION, SOLUTION INTRAVENOUS ONCE
OUTPATIENT
Start: 2023-07-19

## 2023-04-26 RX ORDER — SODIUM CHLORIDE 9 MG/ML
20 INJECTION, SOLUTION INTRAVENOUS ONCE
OUTPATIENT
Start: 2023-06-28

## 2023-04-26 RX ORDER — SODIUM CHLORIDE 9 MG/ML
20 INJECTION, SOLUTION INTRAVENOUS ONCE
OUTPATIENT
Start: 2023-05-17

## 2023-04-26 RX ADMIN — SODIUM CHLORIDE 20 ML/HR: 9 INJECTION, SOLUTION INTRAVENOUS at 14:02

## 2023-04-26 RX ADMIN — SODIUM CHLORIDE 200 MG: 9 INJECTION, SOLUTION INTRAVENOUS at 14:36

## 2023-04-26 NOTE — PROGRESS NOTES
Keytruda completed  NSS flush done  Port de-accessed, dsd applied  Disch amb to home, steady gait  More appts made

## 2023-04-29 NOTE — PROGRESS NOTES
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 ([facility] [address] )   3/1/2018 -  Cancer Staged    Staging form: Lung, AJCC 8th Edition  - Clinical stage from 3/1/2018: Stage IVB (cTX, cN2, cM1c) - Signed by Juan Townsend DO on 2022 Initial Diagnosis    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 ([facility] [address] )     2022 - 2022 Chemotherapy    pembrolizumab (KEYTRUDA) IVPB, 200 mg, Intravenous, Once, 10 of 13 cycles  Administration: 200 mg (2022), 200 mg (6/15/2022), 200 mg (2022), 200 mg (2022), 200 mg (2022), 200 mg (2022), 200 mg (2022), 200 mg (10/19/2022), 200 mg (2022), 200 mg (2022)     2022 - 2022 Chemotherapy    pembrolizumab (KEYTRUDA) IVPB, 400 mg, Intravenous, Once, 1 of 6 cycles     2022 -  Chemotherapy    pembrolizumab (KEYTRUDA) IVPB, 200 mg, Intravenous, Once, 7 of 13 cycles  Administration: 200 mg (2022), 200 mg (2023), 200 mg (2023), 200 mg (2023), 200 mg (3/15/2023), 200 mg (2023), 200 mg (2023)         Interval History:         REVIEW OF SYSTEMS:  Please note that a 14-point review of systems was performed to include Constitutional, HEENT, Respiratory, CVS, GI, , Musculoskeletal, Integumentary, Neurologic, Rheumatologic, Endocrinologic, Psychiatric, Lymphatic, and Hematologic/Oncologic systems were reviewed and are negative unless otherwise stated in HPI  Positive and negative findings pertinent to this evaluation are incorporated into the history of present illness        ECOG PS: 0    PROBLEM LIST:  Patient Active Problem List   Diagnosis   • 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 [facility])   • Essential hypertension   • Hypokalemia   • Mixed hyperlipidemia   • Glaucoma of both eyes   • Type 2 diabetes mellitus with hyperglycemia, without long-term current use of insulin ([facility])   • Colostomy in place [facility])   • Dysequilibrium   • Unilateral edema of lower extremity   • DVT of deep femoral vein, left ([facility])       Assessment / Plan:        I spent 20 minutes on chart review, face to face counseling time, coordination of care and documentation  Past Medical History:   has a past medical history of Diabetes mellitus ([address] ), High blood pressure, and High cholesterol  PAST SURGICAL HISTORY:   has a past surgical history that includes Elbow surgery (Left, 2004)      CURRENT MEDICATIONS  Current Outpatient Medications   Medication Sig Dispense Refill   • apixaban (Eliquis) 5 mg Take 1 tablet (5 mg total) by mouth 2 (two) times a day 60 tablet 11   • Calcium Carb-Cholecalciferol (CALCIUM 1000 + D PO) Take by mouth     • Cholecalciferol (Vitamin D3) 1 25 MG (81650 UT) CAPS Take by mouth     • Contour Next Test test strip Test blood sugar once daily 100 strip 3   • dorzolamide (TRUSOPT) 2 % ophthalmic solution      • dorzolamide-timolol (COSOPT) 22 3-6 8 MG/ML ophthalmic solution INSTILL 1 DROP INTO EACH EYE TWICE DAILY     • glipiZIDE (GLUCOTROL XL) 2 5 mg 24 hr tablet Take 1 tablet by mouth once daily 90 tablet 0   • hydrochlorothiazide (HYDRODIURIL) 25 mg tablet Take 1 tablet (25 mg total) by mouth daily 90 tablet 3   • lisinopril (ZESTRIL) 20 mg tablet Take 1 tablet (20 mg total) by mouth daily 90 tablet 3   • lovastatin (MEVACOR) 20 mg tablet Take 1 tablet by mouth once daily 90 tablet 0   • methocarbamol (ROBAXIN) 500 mg tablet Take 1 tablet (500 mg total) by mouth 3 (three) times a day 20 tablet 0   • Microlet Lancets MISC Use to test glucose once a day 100 each 3   • mupirocin (BACTROBAN) 2 % ointment Apply topically 3 (three) times a day 22 g 1   • potassium chloride (K-DUR,KLOR-CON) 20 mEq tablet TAKE 1  BY MOUTH ONCE DAILY 90 tablet 0   • bumetanide (BUMEX) 1 mg tablet Take 1 tablet (1 mg total) by mouth daily as needed (edema) (Patient not taking: Reported on 3/15/2023) 30 tablet 1   • methylPREDNISolone 4 MG tablet therapy pack Use as directed on package (Patient not taking: Reported on 2/22/2023) 1 each 0   • neomycin-polymyxin-hydrocortisone (CORTISPORIN) otic solution Administer 4 drops to the right ear every 8 (eight) hours (Patient not taking: Reported on 2/22/2023) 10 mL 0     No current facility-administered medications for this visit  [unfilled]    SOCIAL HISTORY:   reports that he quit smoking about 17 years ago  His smoking use included cigarettes  He started smoking about 66 years ago  He has a 50 00 pack-year smoking history  He has never used smokeless tobacco  He reports current alcohol use of about 21 0 standard drinks per week  He reports that he does not use drugs  FAMILY HISTORY:  family history includes Colon cancer in his father  ALLERGIES:  is allergic to aspirin and ibuprofen  Physical Exam:  Vital Signs:   Visit Vitals  /60 (BP Location: Right arm, Patient Position: Sitting, Cuff Size: Adult)   Pulse 75   Temp (!) 97 °F (36 1 °C)   Resp 18   Ht 6' (1 829 m)   Wt 111 kg (245 lb)   SpO2 96%   BMI 33 23 kg/m²   Smoking Status Former   BSA 2 32 m²     Body mass index is 33 23 kg/m²  Body surface area is 2 32 meters squared  GEN: Alert, awake oriented x3, in no acute distress  HEENT- No pallor, icterus, cyanosis, no oral mucosal lesions,   LAD - no palpable cervical, clavicle, axillary, inguinal LAD  Heart- normal S1 S2, regular rate and rhythm, No murmur, rubs     Lungs- clear breathing sound bilateral    Abdomen- soft, Non tender, bowel sounds present  Extremities- No cyanosis, clubbing, edema  Neuro- No focal neurological deficit    Labs:  Lab Results   Component Value Date    WBC 4 26 (L) 04/24/2023    HGB 15 1 04/24/2023    HCT 44 9 04/24/2023    MCV 94 04/24/2023     04/24/2023     Lab Results   Component Value Date    SODIUM 142 04/24/2023    K 3 6 04/24/2023     04/24/2023    CO2 30 04/24/2023    AGAP 5 04/24/2023    BUN 16 04/24/2023    CREATININE 0 71 04/24/2023    GLUC 106 04/24/2023    GLUF 188 (H) 01/30/2023    CALCIUM 9 0 04/24/2023    AST 24 04/24/2023    ALT 20 04/24/2023    ALKPHOS 79 04/24/2023    TP 6 8 04/24/2023    TBILI 0 74 04/24/2023    EGFR 89 04/24/2023

## 2023-05-15 ENCOUNTER — HOSPITAL ENCOUNTER (OUTPATIENT)
Dept: INFUSION CENTER | Facility: HOSPITAL | Age: 78
Discharge: HOME | End: 2023-05-15

## 2023-05-15 DIAGNOSIS — C34.90 NSCLC METASTATIC TO BONE (HCC): Primary | ICD-10-CM

## 2023-05-15 DIAGNOSIS — C79.51 NSCLC METASTATIC TO BONE (HCC): Primary | ICD-10-CM

## 2023-05-15 LAB
ALBUMIN SERPL BCP-MCNC: 3.6 G/DL (ref 3.5–5)
ALP SERPL-CCNC: 87 U/L (ref 34–104)
ALT SERPL W P-5'-P-CCNC: 21 U/L (ref 7–52)
ANION GAP SERPL CALCULATED.3IONS-SCNC: 8 MMOL/L (ref 4–13)
AST SERPL W P-5'-P-CCNC: 23 U/L (ref 13–39)
BASOPHILS # BLD AUTO: 0.04 THOUSANDS/ÂΜL (ref 0–0.1)
BASOPHILS NFR BLD AUTO: 1 % (ref 0–1)
BILIRUB SERPL-MCNC: 0.69 MG/DL (ref 0.2–1)
BUN SERPL-MCNC: 18 MG/DL (ref 5–25)
CALCIUM SERPL-MCNC: 9.2 MG/DL (ref 8.4–10.2)
CHLORIDE SERPL-SCNC: 105 MMOL/L (ref 96–108)
CO2 SERPL-SCNC: 27 MMOL/L (ref 21–32)
CREAT SERPL-MCNC: 0.87 MG/DL (ref 0.6–1.3)
EOSINOPHIL # BLD AUTO: 0.17 THOUSAND/ÂΜL (ref 0–0.61)
EOSINOPHIL NFR BLD AUTO: 5 % (ref 0–6)
ERYTHROCYTE [DISTWIDTH] IN BLOOD BY AUTOMATED COUNT: 14.3 % (ref 11.6–15.1)
GFR SERPL CREATININE-BSD FRML MDRD: 82 ML/MIN/1.73SQ M
GLUCOSE SERPL-MCNC: 214 MG/DL (ref 65–140)
HCT VFR BLD AUTO: 46 % (ref 36.5–49.3)
HGB BLD-MCNC: 15.2 G/DL (ref 12–17)
IMM GRANULOCYTES # BLD AUTO: 0.01 THOUSAND/UL (ref 0–0.2)
IMM GRANULOCYTES NFR BLD AUTO: 0 % (ref 0–2)
LYMPHOCYTES # BLD AUTO: 0.4 THOUSANDS/ÂΜL (ref 0.6–4.47)
LYMPHOCYTES NFR BLD AUTO: 11 % (ref 14–44)
MCH RBC QN AUTO: 31.1 PG (ref 26.8–34.3)
MCHC RBC AUTO-ENTMCNC: 33 G/DL (ref 31.4–37.4)
MCV RBC AUTO: 94 FL (ref 82–98)
MONOCYTES # BLD AUTO: 0.26 THOUSAND/ÂΜL (ref 0.17–1.22)
MONOCYTES NFR BLD AUTO: 7 % (ref 4–12)
NEUTROPHILS # BLD AUTO: 2.67 THOUSANDS/ÂΜL (ref 1.85–7.62)
NEUTS SEG NFR BLD AUTO: 76 % (ref 43–75)
NRBC BLD AUTO-RTO: 0 /100 WBCS
PLATELET # BLD AUTO: 176 THOUSANDS/UL (ref 149–390)
PMV BLD AUTO: 11.1 FL (ref 8.9–12.7)
POTASSIUM SERPL-SCNC: 3.6 MMOL/L (ref 3.5–5.3)
PROT SERPL-MCNC: 6.8 G/DL (ref 6.4–8.4)
RBC # BLD AUTO: 4.89 MILLION/UL (ref 3.88–5.62)
SODIUM SERPL-SCNC: 140 MMOL/L (ref 135–147)
T3FREE SERPL-MCNC: 2.66 PG/ML (ref 2.3–4.2)
TSH SERPL DL<=0.05 MIU/L-ACNC: 1.3 UIU/ML (ref 0.45–4.5)
WBC # BLD AUTO: 3.55 THOUSAND/UL (ref 4.31–10.16)

## 2023-05-17 ENCOUNTER — HOSPITAL ENCOUNTER (OUTPATIENT)
Dept: INFUSION CENTER | Facility: HOSPITAL | Age: 78
Discharge: HOME | End: 2023-05-17
Attending: INTERNAL MEDICINE

## 2023-05-17 VITALS
HEART RATE: 68 BPM | OXYGEN SATURATION: 96 % | DIASTOLIC BLOOD PRESSURE: 62 MMHG | BODY MASS INDEX: 33.2 KG/M2 | HEIGHT: 72 IN | RESPIRATION RATE: 16 BRPM | SYSTOLIC BLOOD PRESSURE: 133 MMHG | TEMPERATURE: 97.4 F | WEIGHT: 245.15 LBS

## 2023-05-17 DIAGNOSIS — C34.90 NSCLC METASTATIC TO BONE (HCC): Primary | ICD-10-CM

## 2023-05-17 DIAGNOSIS — C79.51 NSCLC METASTATIC TO BONE (HCC): Primary | ICD-10-CM

## 2023-05-17 RX ORDER — SODIUM CHLORIDE 9 MG/ML
20 INJECTION, SOLUTION INTRAVENOUS ONCE
Status: COMPLETED | OUTPATIENT
Start: 2023-05-17 | End: 2023-05-17

## 2023-05-17 RX ADMIN — SODIUM CHLORIDE 20 ML/HR: 9 INJECTION, SOLUTION INTRAVENOUS at 13:56

## 2023-05-17 RX ADMIN — SODIUM CHLORIDE 200 MG: 9 INJECTION, SOLUTION INTRAVENOUS at 13:57

## 2023-05-17 NOTE — PROGRESS NOTES
Pt tolerated treatment with no adv reactions; appts printed for pt and pt left unit ambulatory with steady gait

## 2023-06-05 ENCOUNTER — HOSPITAL ENCOUNTER (OUTPATIENT)
Dept: INFUSION CENTER | Facility: HOSPITAL | Age: 78
Discharge: HOME | End: 2023-06-05
Payer: MEDICARE

## 2023-06-05 DIAGNOSIS — C34.90 NSCLC METASTATIC TO BONE (HCC): Primary | ICD-10-CM

## 2023-06-05 DIAGNOSIS — C79.51 NSCLC METASTATIC TO BONE (HCC): Primary | ICD-10-CM

## 2023-06-05 LAB
ALBUMIN SERPL BCP-MCNC: 3.6 G/DL (ref 3.5–5)
ALP SERPL-CCNC: 74 U/L (ref 34–104)
ALT SERPL W P-5'-P-CCNC: 22 U/L (ref 7–52)
ANION GAP SERPL CALCULATED.3IONS-SCNC: 5 MMOL/L (ref 4–13)
AST SERPL W P-5'-P-CCNC: 23 U/L (ref 13–39)
BASOPHILS # BLD AUTO: 0.05 THOUSANDS/ÂΜL (ref 0–0.1)
BASOPHILS NFR BLD AUTO: 1 % (ref 0–1)
BILIRUB SERPL-MCNC: 0.74 MG/DL (ref 0.2–1)
BUN SERPL-MCNC: 18 MG/DL (ref 5–25)
CALCIUM SERPL-MCNC: 9.3 MG/DL (ref 8.4–10.2)
CHLORIDE SERPL-SCNC: 106 MMOL/L (ref 96–108)
CO2 SERPL-SCNC: 29 MMOL/L (ref 21–32)
CREAT SERPL-MCNC: 0.8 MG/DL (ref 0.6–1.3)
EOSINOPHIL # BLD AUTO: 0.18 THOUSAND/ÂΜL (ref 0–0.61)
EOSINOPHIL NFR BLD AUTO: 4 % (ref 0–6)
ERYTHROCYTE [DISTWIDTH] IN BLOOD BY AUTOMATED COUNT: 14.5 % (ref 11.6–15.1)
GFR SERPL CREATININE-BSD FRML MDRD: 84 ML/MIN/1.73SQ M
GLUCOSE SERPL-MCNC: 109 MG/DL (ref 65–140)
HCT VFR BLD AUTO: 45.1 % (ref 36.5–49.3)
HGB BLD-MCNC: 14.9 G/DL (ref 12–17)
IMM GRANULOCYTES # BLD AUTO: 0.02 THOUSAND/UL (ref 0–0.2)
IMM GRANULOCYTES NFR BLD AUTO: 0 % (ref 0–2)
LYMPHOCYTES # BLD AUTO: 0.6 THOUSANDS/ÂΜL (ref 0.6–4.47)
LYMPHOCYTES NFR BLD AUTO: 13 % (ref 14–44)
MCH RBC QN AUTO: 31 PG (ref 26.8–34.3)
MCHC RBC AUTO-ENTMCNC: 33 G/DL (ref 31.4–37.4)
MCV RBC AUTO: 94 FL (ref 82–98)
MONOCYTES # BLD AUTO: 0.72 THOUSAND/ÂΜL (ref 0.17–1.22)
MONOCYTES NFR BLD AUTO: 15 % (ref 4–12)
NEUTROPHILS # BLD AUTO: 3.16 THOUSANDS/ÂΜL (ref 1.85–7.62)
NEUTS SEG NFR BLD AUTO: 67 % (ref 43–75)
NRBC BLD AUTO-RTO: 0 /100 WBCS
PLATELET # BLD AUTO: 176 THOUSANDS/UL (ref 149–390)
PMV BLD AUTO: 11.2 FL (ref 8.9–12.7)
POTASSIUM SERPL-SCNC: 3.6 MMOL/L (ref 3.5–5.3)
PROT SERPL-MCNC: 6.7 G/DL (ref 6.4–8.4)
RBC # BLD AUTO: 4.81 MILLION/UL (ref 3.88–5.62)
SODIUM SERPL-SCNC: 140 MMOL/L (ref 135–147)
T3FREE SERPL-MCNC: 2.72 PG/ML (ref 2.5–3.9)
TSH SERPL DL<=0.05 MIU/L-ACNC: 1.48 UIU/ML (ref 0.45–4.5)
WBC # BLD AUTO: 4.73 THOUSAND/UL (ref 4.31–10.16)

## 2023-06-05 PROCEDURE — 84443 ASSAY THYROID STIM HORMONE: CPT | Performed by: INTERNAL MEDICINE

## 2023-06-05 PROCEDURE — 85025 COMPLETE CBC W/AUTO DIFF WBC: CPT | Performed by: INTERNAL MEDICINE

## 2023-06-05 PROCEDURE — 80053 COMPREHEN METABOLIC PANEL: CPT | Performed by: INTERNAL MEDICINE

## 2023-06-05 PROCEDURE — 84481 FREE ASSAY (FT-3): CPT | Performed by: INTERNAL MEDICINE

## 2023-06-05 NOTE — PROGRESS NOTES
Pt here for port flush and labs  Labs obtained without difficulty  Port deaccessed and ba applied  Pt aware of next appt and left unit amb with cane

## 2023-06-06 NOTE — PROGRESS NOTES
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 ([facility] [address] )   3/1/2018 -  Cancer Staged    Staging form: Lung, AJCC 8th Edition  - Clinical stage from 3/1/2018: Stage IVB (cTX, cN2, cM1c) - Signed by Ally Bruce DO on 2022 Initial Diagnosis    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 ([facility] [address] )     2022 - 2022 Chemotherapy    pembrolizumab (KEYTRUDA) IVPB, 200 mg, Intravenous, Once, 10 of 13 cycles  Administration: 200 mg (2022), 200 mg (6/15/2022), 200 mg (2022), 200 mg (2022), 200 mg (2022), 200 mg (2022), 200 mg (2022), 200 mg (10/19/2022), 200 mg (2022), 200 mg (2022)     2022 - 2022 Chemotherapy    pembrolizumab (KEYTRUDA) IVPB, 400 mg, Intravenous, Once, 1 of 6 cycles     2022 -  Chemotherapy    alteplase (CATHFLO), 2 mg, Intracatheter, Every 2 hour PRN, 9 of 16 cycles  pembrolizumab (KEYTRUDA) IVPB, 200 mg, Intravenous, Once, 9 of 16 cycles  Administration: 200 mg (2022), 200 mg (2023), 200 mg (2023), 200 mg (2023), 200 mg (3/15/2023), 200 mg (2023), 200 mg (2023), 200 mg (2023)         Interval History: Patient returns for routine follow-up  He is on maintenance Pembro every 3 weeks  His blood work remains within normal range  Continues to have some itching and mild rash on chest wall and forearms  He is following with dermatology  Otherwise, he denies any side effects from [facility]  Appetite is good, weight stable  No nausea/vomiting    No diarrhea  Blood work normal     REVIEW OF SYSTEMS:  Please note that a 14-point review of systems was performed to include Constitutional, HEENT, Respiratory, CVS, GI, , Musculoskeletal, Integumentary, Neurologic, Rheumatologic, Endocrinologic, Psychiatric, Lymphatic, and Hematologic/Oncologic systems were reviewed and are negative unless otherwise stated in HPI  Positive and negative findings pertinent to this evaluation are incorporated into the history of present illness  ECOG PS: 0    PROBLEM LIST:  Patient Active Problem List   Diagnosis   • 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 [facility])   • Essential hypertension   • Hypokalemia   • Mixed hyperlipidemia   • Glaucoma of both eyes   • Type 2 diabetes mellitus with hyperglycemia, without long-term current use of insulin ([facility])   • Colostomy in place [facility])   • Dysequilibrium   • Unilateral edema of lower extremity   • DVT of deep femoral vein, left ([facility])       Assessment / Plan:  1  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 ([facility] [address] )    2  DVT of deep femoral vein, left [facility])      Patient will proceed with treatment today as scheduled without any change  He was recommended 3 months fu CT chest on his last PET CT completed on 3/9/23 for surveillance of a new right posterior lung base subpleural nodule measures 9 x 6 mm without significant FDG uptake  I have placed orders for CT of chest today  He will have this completed prior to follow-up visit with us in 6 weeks  In the meantime he will continue on his current regimen without change  He knows to continue on Eliquis for his lower extremity DVT  He is instructed to call anytime with questions or concerns    I spent 30 minutes on chart review, face to face counseling time, coordination of care and documentation  Past Medical History:   has a past medical history of Diabetes mellitus ([facility] [address] ), High blood pressure, and High cholesterol      PAST SURGICAL HISTORY:   has a past surgical history that includes Elbow surgery (Left, 2004)  CURRENT MEDICATIONS  Current Outpatient Medications   Medication Sig Dispense Refill   • apixaban (Eliquis) 5 mg Take 1 tablet (5 mg total) by mouth 2 (two) times a day 60 tablet 11   • Calcium Carb-Cholecalciferol (CALCIUM 1000 + D PO) Take by mouth     • Cholecalciferol (Vitamin D3) 1 25 MG (59711 UT) CAPS Take by mouth     • Contour Next Test test strip Test blood sugar once daily 100 strip 3   • dorzolamide (TRUSOPT) 2 % ophthalmic solution      • dorzolamide-timolol (COSOPT) 22 3-6 8 MG/ML ophthalmic solution INSTILL 1 DROP INTO EACH EYE TWICE DAILY     • glipiZIDE (GLUCOTROL XL) 2 5 mg 24 hr tablet Take 1 tablet by mouth once daily 90 tablet 0   • hydrochlorothiazide (HYDRODIURIL) 25 mg tablet Take 1 tablet (25 mg total) by mouth daily 90 tablet 3   • Lancet Devices (Microlet Next Lancing Device) MISC Use in the morning Test blood sugar once daily 1 each 0   • lisinopril (ZESTRIL) 20 mg tablet Take 1 tablet (20 mg total) by mouth daily 90 tablet 3   • lovastatin (MEVACOR) 20 mg tablet Take 1 tablet by mouth once daily 90 tablet 0   • methocarbamol (ROBAXIN) 500 mg tablet Take 1 tablet (500 mg total) by mouth 3 (three) times a day 20 tablet 0   • Microlet Lancets MISC Use to test glucose once a day 100 each 3   • mupirocin (BACTROBAN) 2 % ointment Apply topically 3 (three) times a day 22 g 1   • potassium chloride (K-DUR,KLOR-CON) 20 mEq tablet TAKE 1  BY MOUTH ONCE DAILY 90 tablet 0   • bumetanide (BUMEX) 1 mg tablet Take 1 tablet (1 mg total) by mouth daily as needed (edema) (Patient not taking: Reported on 3/15/2023) 30 tablet 1   • methylPREDNISolone 4 MG tablet therapy pack Use as directed on package (Patient not taking: Reported on 2/22/2023) 1 each 0   • neomycin-polymyxin-hydrocortisone (CORTISPORIN) otic solution Administer 4 drops to the right ear every 8 (eight) hours (Patient not taking: Reported on 2/22/2023) 10 mL 0     No current facility-administered medications for this visit  Facility-Administered Medications Ordered in Other Visits   Medication Dose Route Frequency Provider Last Rate Last Admin   • pembrolizumab (KEYTRUDA) 200 mg in sodium chloride 0 9 % 50 mL IVPB  200 mg Intravenous Once Mishel Trinh DO       • sodium chloride 0 9 % infusion  20 mL/hr Intravenous Once Mishel Trinh DO         [unfilled]    SOCIAL HISTORY:   reports that he quit smoking about 17 years ago  His smoking use included cigarettes  He started smoking about 66 years ago  He has a 50 00 pack-year smoking history  He has never used smokeless tobacco  He reports current alcohol use of about 21 0 standard drinks of alcohol per week  He reports that he does not use drugs  FAMILY HISTORY:  family history includes Colon cancer in his father  ALLERGIES:  is allergic to aspirin and ibuprofen  Physical Exam:  Vital Signs:   Visit Vitals  /74 (BP Location: Right arm, Patient Position: Sitting, Cuff Size: Adult)   Pulse 77   Temp 98 °F (36 7 °C)   Resp 17   Ht 6' (1 829 m)   Wt 111 kg (244 lb)   SpO2 96%   BMI 33 09 kg/m²   Smoking Status Former   BSA 2 32 m²     Body mass index is 33 09 kg/m²  Body surface area is 2 32 meters squared  Physical Exam  Constitutional:       General: He is not in acute distress  Appearance: He is well-developed  He is not diaphoretic  HENT:      Head: Normocephalic and atraumatic  Mouth/Throat:      Pharynx: No oropharyngeal exudate  Eyes:      General: No scleral icterus  Pupils: Pupils are equal, round, and reactive to light  Cardiovascular:      Rate and Rhythm: Normal rate and regular rhythm  Heart sounds: No murmur heard  Pulmonary:      Effort: Pulmonary effort is normal  No respiratory distress  Breath sounds: Normal breath sounds  Abdominal:      General: Bowel sounds are normal  There is no distension  Palpations: Abdomen is soft  There is no mass  Tenderness: There is no abdominal tenderness  Musculoskeletal:         General: Normal range of motion  Cervical back: Normal range of motion and neck supple  Lymphadenopathy:      Cervical: No cervical adenopathy  Skin:     General: Skin is warm and dry  Findings: Rash (mild on chest wall, forearms ) present  Neurological:      General: No focal deficit present  Mental Status: He is alert and oriented to person, place, and time  Psychiatric:         Mood and Affect: Mood normal          Behavior: Behavior normal          Thought Content:  Thought content normal          Judgment: Judgment normal          Labs:  Lab Results   Component Value Date    HCT 45 1 06/05/2023    HGB 14 9 06/05/2023    MCV 94 06/05/2023     06/05/2023    WBC 4 73 06/05/2023     Lab Results   Component Value Date    AGAP 5 06/05/2023    ALKPHOS 74 06/05/2023    ALT 22 06/05/2023    AST 23 06/05/2023    BUN 18 06/05/2023    CALCIUM 9 3 06/05/2023     06/05/2023    CO2 29 06/05/2023    CREATININE 0 80 06/05/2023    EGFR 84 06/05/2023    GLUC 109 06/05/2023    GLUF 188 (H) 01/30/2023    K 3 6 06/05/2023    SODIUM 140 06/05/2023    TBILI 0 74 06/05/2023    TP 6 7 06/05/2023

## 2023-06-07 ENCOUNTER — OFFICE VISIT (OUTPATIENT)
Age: 78
End: 2023-06-07
Payer: MEDICARE

## 2023-06-07 ENCOUNTER — HOSPITAL ENCOUNTER (OUTPATIENT)
Dept: INFUSION CENTER | Facility: HOSPITAL | Age: 78
Discharge: HOME | End: 2023-06-07
Attending: INTERNAL MEDICINE
Payer: MEDICARE

## 2023-06-07 VITALS
WEIGHT: 244 LBS | HEIGHT: 72 IN | DIASTOLIC BLOOD PRESSURE: 74 MMHG | SYSTOLIC BLOOD PRESSURE: 150 MMHG | OXYGEN SATURATION: 96 % | TEMPERATURE: 98 F | RESPIRATION RATE: 17 BRPM | BODY MASS INDEX: 33.05 KG/M2 | HEART RATE: 77 BPM

## 2023-06-07 VITALS
HEART RATE: 61 BPM | OXYGEN SATURATION: 98 % | RESPIRATION RATE: 18 BRPM | SYSTOLIC BLOOD PRESSURE: 151 MMHG | DIASTOLIC BLOOD PRESSURE: 70 MMHG | TEMPERATURE: 96.5 F

## 2023-06-07 DIAGNOSIS — C79.51 NSCLC METASTATIC TO BONE (HCC): Primary | ICD-10-CM

## 2023-06-07 DIAGNOSIS — C34.90 NSCLC METASTATIC TO BONE (HCC): Primary | ICD-10-CM

## 2023-06-07 DIAGNOSIS — I82.412 DVT OF DEEP FEMORAL VEIN, LEFT (HCC): ICD-10-CM

## 2023-06-07 PROCEDURE — 99214 OFFICE O/P EST MOD 30 MIN: CPT | Performed by: NURSE PRACTITIONER

## 2023-06-07 RX ORDER — SODIUM CHLORIDE 9 MG/ML
20 INJECTION, SOLUTION INTRAVENOUS ONCE
Status: COMPLETED | OUTPATIENT
Start: 2023-06-07 | End: 2023-06-07

## 2023-06-07 RX ADMIN — SODIUM CHLORIDE 200 MG: 9 INJECTION, SOLUTION INTRAVENOUS at 09:43

## 2023-06-07 RX ADMIN — SODIUM CHLORIDE 20 ML/HR: 9 INJECTION, SOLUTION INTRAVENOUS at 09:43

## 2023-06-07 NOTE — PROGRESS NOTES
Chemotherapy infusion completed with no adverse reactions  Aware of next appointment, declined AVS  Left unit ambulatory with steady gait

## 2023-06-26 ENCOUNTER — HOSPITAL ENCOUNTER (OUTPATIENT)
Dept: INFUSION CENTER | Facility: HOSPITAL | Age: 78
Discharge: HOME | End: 2023-06-26
Payer: MEDICARE

## 2023-06-26 DIAGNOSIS — C34.90 NSCLC METASTATIC TO BONE (HCC): Primary | ICD-10-CM

## 2023-06-26 DIAGNOSIS — C79.51 NSCLC METASTATIC TO BONE (HCC): Primary | ICD-10-CM

## 2023-06-26 LAB
ALBUMIN SERPL BCP-MCNC: 3.7 G/DL (ref 3.5–5)
ALP SERPL-CCNC: 73 U/L (ref 34–104)
ALT SERPL W P-5'-P-CCNC: 20 U/L (ref 7–52)
ANION GAP SERPL CALCULATED.3IONS-SCNC: 6 MMOL/L
AST SERPL W P-5'-P-CCNC: 21 U/L (ref 13–39)
BASOPHILS # BLD AUTO: 0.03 THOUSANDS/ÂΜL (ref 0–0.1)
BASOPHILS NFR BLD AUTO: 1 % (ref 0–1)
BILIRUB SERPL-MCNC: 0.65 MG/DL (ref 0.2–1)
BUN SERPL-MCNC: 21 MG/DL (ref 5–25)
CALCIUM SERPL-MCNC: 8.9 MG/DL (ref 8.4–10.2)
CHLORIDE SERPL-SCNC: 105 MMOL/L (ref 96–108)
CO2 SERPL-SCNC: 29 MMOL/L (ref 21–32)
CREAT SERPL-MCNC: 0.85 MG/DL (ref 0.6–1.3)
EOSINOPHIL # BLD AUTO: 0.15 THOUSAND/ÂΜL (ref 0–0.61)
EOSINOPHIL NFR BLD AUTO: 3 % (ref 0–6)
ERYTHROCYTE [DISTWIDTH] IN BLOOD BY AUTOMATED COUNT: 14.4 % (ref 11.6–15.1)
GFR SERPL CREATININE-BSD FRML MDRD: 82 ML/MIN/1.73SQ M
GLUCOSE SERPL-MCNC: 167 MG/DL (ref 65–140)
HCT VFR BLD AUTO: 44.3 % (ref 36.5–49.3)
HGB BLD-MCNC: 14.9 G/DL (ref 12–17)
IMM GRANULOCYTES # BLD AUTO: 0.02 THOUSAND/UL (ref 0–0.2)
IMM GRANULOCYTES NFR BLD AUTO: 0 % (ref 0–2)
LYMPHOCYTES # BLD AUTO: 0.57 THOUSANDS/ÂΜL (ref 0.6–4.47)
LYMPHOCYTES NFR BLD AUTO: 12 % (ref 14–44)
MCH RBC QN AUTO: 31.7 PG (ref 26.8–34.3)
MCHC RBC AUTO-ENTMCNC: 33.6 G/DL (ref 31.4–37.4)
MCV RBC AUTO: 94 FL (ref 82–98)
MONOCYTES # BLD AUTO: 0.52 THOUSAND/ÂΜL (ref 0.17–1.22)
MONOCYTES NFR BLD AUTO: 11 % (ref 4–12)
NEUTROPHILS # BLD AUTO: 3.54 THOUSANDS/ÂΜL (ref 1.85–7.62)
NEUTS SEG NFR BLD AUTO: 73 % (ref 43–75)
NRBC BLD AUTO-RTO: 0 /100 WBCS
PLATELET # BLD AUTO: 178 THOUSANDS/UL (ref 149–390)
PMV BLD AUTO: 11 FL (ref 8.9–12.7)
POTASSIUM SERPL-SCNC: 3.5 MMOL/L (ref 3.5–5.3)
PROT SERPL-MCNC: 6.9 G/DL (ref 6.4–8.4)
RBC # BLD AUTO: 4.7 MILLION/UL (ref 3.88–5.62)
SODIUM SERPL-SCNC: 140 MMOL/L (ref 135–147)
T3FREE SERPL-MCNC: 2.98 PG/ML (ref 2.5–3.9)
TSH SERPL DL<=0.05 MIU/L-ACNC: 1.5 UIU/ML (ref 0.45–4.5)
WBC # BLD AUTO: 4.83 THOUSAND/UL (ref 4.31–10.16)

## 2023-06-26 PROCEDURE — 84443 ASSAY THYROID STIM HORMONE: CPT | Performed by: INTERNAL MEDICINE

## 2023-06-26 PROCEDURE — 80053 COMPREHEN METABOLIC PANEL: CPT | Performed by: INTERNAL MEDICINE

## 2023-06-26 PROCEDURE — 85025 COMPLETE CBC W/AUTO DIFF WBC: CPT | Performed by: INTERNAL MEDICINE

## 2023-06-26 PROCEDURE — 84481 FREE ASSAY (FT-3): CPT | Performed by: INTERNAL MEDICINE

## 2023-06-26 NOTE — PROGRESS NOTES
Pt's port accessed using sterile technique  Labs drawn without difficulty  Port de-accessed  Band-aid and gauze applied  Pt ambulated with a steady gait off unit   Declined AVS

## 2023-06-28 ENCOUNTER — HOSPITAL ENCOUNTER (OUTPATIENT)
Dept: INFUSION CENTER | Facility: HOSPITAL | Age: 78
Discharge: HOME | End: 2023-06-28
Attending: INTERNAL MEDICINE
Payer: MEDICARE

## 2023-06-28 VITALS
OXYGEN SATURATION: 96 % | BODY MASS INDEX: 33.03 KG/M2 | RESPIRATION RATE: 16 BRPM | WEIGHT: 243.83 LBS | SYSTOLIC BLOOD PRESSURE: 128 MMHG | HEIGHT: 72 IN | TEMPERATURE: 41 F | HEART RATE: 80 BPM | DIASTOLIC BLOOD PRESSURE: 60 MMHG

## 2023-06-28 DIAGNOSIS — C34.90 NSCLC METASTATIC TO BONE (HCC): Primary | ICD-10-CM

## 2023-06-28 DIAGNOSIS — C79.51 NSCLC METASTATIC TO BONE (HCC): Primary | ICD-10-CM

## 2023-06-28 RX ORDER — SODIUM CHLORIDE 9 MG/ML
20 INJECTION, SOLUTION INTRAVENOUS ONCE
Status: COMPLETED | OUTPATIENT
Start: 2023-06-28 | End: 2023-06-28

## 2023-06-28 RX ADMIN — SODIUM CHLORIDE 20 ML/HR: 9 INJECTION, SOLUTION INTRAVENOUS at 10:00

## 2023-06-28 RX ADMIN — SODIUM CHLORIDE 200 MG: 9 INJECTION, SOLUTION INTRAVENOUS at 10:31

## 2023-06-28 NOTE — PROGRESS NOTES
Patient keytruda infusion completed without complication  Patient declined AVS at this time and confirmed next appointment  Patient discharged in stable condition to home via ambulation

## 2023-07-03 DIAGNOSIS — C34.90 NSCLC METASTATIC TO BONE (HCC): ICD-10-CM

## 2023-07-03 DIAGNOSIS — C79.51 NSCLC METASTATIC TO BONE (HCC): ICD-10-CM

## 2023-07-03 DIAGNOSIS — E87.6 HYPOKALEMIA: ICD-10-CM

## 2023-07-03 RX ORDER — POTASSIUM CHLORIDE 20 MEQ/1
TABLET, EXTENDED RELEASE ORAL
Qty: 90 TABLET | Refills: 0 | Status: SHIPPED | OUTPATIENT
Start: 2023-07-03

## 2023-07-12 ENCOUNTER — HOSPITAL ENCOUNTER (OUTPATIENT)
Dept: CT IMAGING | Facility: HOSPITAL | Age: 78
Discharge: HOME | End: 2023-07-12
Payer: MEDICARE

## 2023-07-12 DIAGNOSIS — C34.90 NSCLC METASTATIC TO BONE (HCC): ICD-10-CM

## 2023-07-12 DIAGNOSIS — C79.51 NSCLC METASTATIC TO BONE (HCC): ICD-10-CM

## 2023-07-12 PROCEDURE — G1004 CDSM NDSC: HCPCS

## 2023-07-12 PROCEDURE — 71260 CT THORAX DX C+: CPT

## 2023-07-12 RX ADMIN — IOHEXOL 80 ML: 350 INJECTION, SOLUTION INTRAVENOUS at 09:38

## 2023-07-13 ENCOUNTER — TELEPHONE (OUTPATIENT)
Dept: FAMILY MEDICINE CLINIC | Facility: HOSPITAL | Age: 78
End: 2023-07-13

## 2023-07-16 DIAGNOSIS — E11.65 TYPE 2 DIABETES MELLITUS WITH HYPERGLYCEMIA, WITHOUT LONG-TERM CURRENT USE OF INSULIN (HCC): ICD-10-CM

## 2023-07-16 DIAGNOSIS — E78.2 MIXED HYPERLIPIDEMIA: ICD-10-CM

## 2023-07-16 RX ORDER — LOVASTATIN 20 MG/1
TABLET ORAL
Qty: 90 TABLET | Refills: 0 | Status: SHIPPED | OUTPATIENT
Start: 2023-07-16

## 2023-07-16 RX ORDER — GLIPIZIDE 2.5 MG/1
TABLET, EXTENDED RELEASE ORAL
Qty: 90 TABLET | Refills: 0 | Status: SHIPPED | OUTPATIENT
Start: 2023-07-16

## 2023-07-17 ENCOUNTER — HOSPITAL ENCOUNTER (OUTPATIENT)
Dept: INFUSION CENTER | Facility: HOSPITAL | Age: 78
Discharge: HOME | End: 2023-07-17
Payer: MEDICARE

## 2023-07-17 DIAGNOSIS — C79.51 NSCLC METASTATIC TO BONE (HCC): Primary | ICD-10-CM

## 2023-07-17 DIAGNOSIS — C34.90 NSCLC METASTATIC TO BONE (HCC): Primary | ICD-10-CM

## 2023-07-17 LAB
ALBUMIN SERPL BCP-MCNC: 3.6 G/DL (ref 3.5–5)
ALP SERPL-CCNC: 95 U/L (ref 34–104)
ALT SERPL W P-5'-P-CCNC: 14 U/L (ref 7–52)
ANION GAP SERPL CALCULATED.3IONS-SCNC: 6 MMOL/L
AST SERPL W P-5'-P-CCNC: 18 U/L (ref 13–39)
BASOPHILS # BLD AUTO: 0.06 THOUSANDS/ÂΜL (ref 0–0.1)
BASOPHILS NFR BLD AUTO: 1 % (ref 0–1)
BILIRUB SERPL-MCNC: 0.98 MG/DL (ref 0.2–1)
BUN SERPL-MCNC: 19 MG/DL (ref 5–25)
CALCIUM SERPL-MCNC: 8.9 MG/DL (ref 8.4–10.2)
CHLORIDE SERPL-SCNC: 104 MMOL/L (ref 96–108)
CO2 SERPL-SCNC: 28 MMOL/L (ref 21–32)
CREAT SERPL-MCNC: 0.83 MG/DL (ref 0.6–1.3)
EOSINOPHIL # BLD AUTO: 0.2 THOUSAND/ÂΜL (ref 0–0.61)
EOSINOPHIL NFR BLD AUTO: 4 % (ref 0–6)
ERYTHROCYTE [DISTWIDTH] IN BLOOD BY AUTOMATED COUNT: 13.9 % (ref 11.6–15.1)
GFR SERPL CREATININE-BSD FRML MDRD: 83 ML/MIN/1.73SQ M
GLUCOSE SERPL-MCNC: 187 MG/DL (ref 65–140)
HCT VFR BLD AUTO: 46.1 % (ref 36.5–49.3)
HGB BLD-MCNC: 15.4 G/DL (ref 12–17)
IMM GRANULOCYTES # BLD AUTO: 0.04 THOUSAND/UL (ref 0–0.2)
IMM GRANULOCYTES NFR BLD AUTO: 1 % (ref 0–2)
LYMPHOCYTES # BLD AUTO: 0.48 THOUSANDS/ÂΜL (ref 0.6–4.47)
LYMPHOCYTES NFR BLD AUTO: 9 % (ref 14–44)
MCH RBC QN AUTO: 31.2 PG (ref 26.8–34.3)
MCHC RBC AUTO-ENTMCNC: 33.4 G/DL (ref 31.4–37.4)
MCV RBC AUTO: 93 FL (ref 82–98)
MONOCYTES # BLD AUTO: 0.39 THOUSAND/ÂΜL (ref 0.17–1.22)
MONOCYTES NFR BLD AUTO: 7 % (ref 4–12)
NEUTROPHILS # BLD AUTO: 4.32 THOUSANDS/ÂΜL (ref 1.85–7.62)
NEUTS SEG NFR BLD AUTO: 78 % (ref 43–75)
NRBC BLD AUTO-RTO: 0 /100 WBCS
PLATELET # BLD AUTO: 209 THOUSANDS/UL (ref 149–390)
PMV BLD AUTO: 10.4 FL (ref 8.9–12.7)
POTASSIUM SERPL-SCNC: 3.8 MMOL/L (ref 3.5–5.3)
PROT SERPL-MCNC: 7.2 G/DL (ref 6.4–8.4)
RBC # BLD AUTO: 4.94 MILLION/UL (ref 3.88–5.62)
SODIUM SERPL-SCNC: 138 MMOL/L (ref 135–147)
T3FREE SERPL-MCNC: 3.63 PG/ML (ref 2.5–3.9)
TSH SERPL DL<=0.05 MIU/L-ACNC: 1.74 UIU/ML (ref 0.45–4.5)
WBC # BLD AUTO: 5.49 THOUSAND/UL (ref 4.31–10.16)

## 2023-07-17 PROCEDURE — 80053 COMPREHEN METABOLIC PANEL: CPT | Performed by: INTERNAL MEDICINE

## 2023-07-17 PROCEDURE — 85025 COMPLETE CBC W/AUTO DIFF WBC: CPT | Performed by: INTERNAL MEDICINE

## 2023-07-17 PROCEDURE — 84481 FREE ASSAY (FT-3): CPT | Performed by: INTERNAL MEDICINE

## 2023-07-17 PROCEDURE — 84443 ASSAY THYROID STIM HORMONE: CPT | Performed by: INTERNAL MEDICINE

## 2023-07-17 NOTE — PROGRESS NOTES
Labs obtained without difficulty. Pt aware of next appt, declined avs. Left ambulatory with cane for d/c.

## 2023-07-19 ENCOUNTER — HOSPITAL ENCOUNTER (OUTPATIENT)
Dept: INFUSION CENTER | Facility: HOSPITAL | Age: 78
Discharge: HOME | End: 2023-07-19
Attending: INTERNAL MEDICINE
Payer: MEDICARE

## 2023-07-19 VITALS
SYSTOLIC BLOOD PRESSURE: 145 MMHG | RESPIRATION RATE: 18 BRPM | TEMPERATURE: 97.6 F | OXYGEN SATURATION: 97 % | HEART RATE: 72 BPM | DIASTOLIC BLOOD PRESSURE: 76 MMHG

## 2023-07-19 DIAGNOSIS — C34.90 NSCLC METASTATIC TO BONE (HCC): Primary | ICD-10-CM

## 2023-07-19 DIAGNOSIS — C79.51 NSCLC METASTATIC TO BONE (HCC): Primary | ICD-10-CM

## 2023-07-19 RX ORDER — SODIUM CHLORIDE 9 MG/ML
20 INJECTION, SOLUTION INTRAVENOUS ONCE
Status: COMPLETED | OUTPATIENT
Start: 2023-07-19 | End: 2023-07-19

## 2023-07-19 RX ADMIN — SODIUM CHLORIDE 200 MG: 9 INJECTION, SOLUTION INTRAVENOUS at 10:27

## 2023-07-19 RX ADMIN — SODIUM CHLORIDE 20 ML/HR: 9 INJECTION, SOLUTION INTRAVENOUS at 10:27

## 2023-07-19 NOTE — PROGRESS NOTES
Chemotherapy infusion completed with no adverse reactions. Next appointments scheduled and printed. Left unit ambulatory with steady gait.

## 2023-08-02 ENCOUNTER — OFFICE VISIT (OUTPATIENT)
Age: 78
End: 2023-08-02
Payer: MEDICARE

## 2023-08-02 VITALS
OXYGEN SATURATION: 95 % | SYSTOLIC BLOOD PRESSURE: 119 MMHG | WEIGHT: 246 LBS | RESPIRATION RATE: 16 BRPM | HEART RATE: 64 BPM | DIASTOLIC BLOOD PRESSURE: 60 MMHG | TEMPERATURE: 98 F | BODY MASS INDEX: 33.32 KG/M2 | HEIGHT: 72 IN

## 2023-08-02 DIAGNOSIS — C34.90 NSCLC METASTATIC TO BONE (HCC): Primary | ICD-10-CM

## 2023-08-02 DIAGNOSIS — C79.51 NSCLC METASTATIC TO BONE (HCC): Primary | ICD-10-CM

## 2023-08-02 PROCEDURE — 99213 OFFICE O/P EST LOW 20 MIN: CPT | Performed by: INTERNAL MEDICINE

## 2023-08-02 RX ORDER — SODIUM CHLORIDE 9 MG/ML
20 INJECTION, SOLUTION INTRAVENOUS ONCE
OUTPATIENT
Start: 2023-09-20

## 2023-08-02 RX ORDER — SODIUM CHLORIDE 9 MG/ML
20 INJECTION, SOLUTION INTRAVENOUS ONCE
OUTPATIENT
Start: 2023-08-30

## 2023-08-02 RX ORDER — SODIUM CHLORIDE 9 MG/ML
20 INJECTION, SOLUTION INTRAVENOUS ONCE
OUTPATIENT
Start: 2023-10-11

## 2023-08-02 RX ORDER — SODIUM CHLORIDE 9 MG/ML
20 INJECTION, SOLUTION INTRAVENOUS ONCE
Status: CANCELLED | OUTPATIENT
Start: 2023-08-09

## 2023-08-05 NOTE — PROGRESS NOTES
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 ([address])   3/1/2018 -  Cancer Staged    Staging form: Lung, AJCC 8th Edition  - Clinical stage from 3/1/2018: Stage IVB (cTX, cN2, cM1c) - Signed by Anne-Marie Hager DO on 2022 Initial Diagnosis    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 ([address])     2022 - 2022 Chemotherapy    pembrolizumab (KEYTRUDA) IVPB, 200 mg, Intravenous, Once, 10 of 13 cycles  Administration: 200 mg (2022), 200 mg (6/15/2022), 200 mg (2022), 200 mg (2022), 200 mg (2022), 200 mg (2022), 200 mg (2022), 200 mg (10/19/2022), 200 mg (2022), 200 mg (2022)     2022 - 2022 Chemotherapy    pembrolizumab (KEYTRUDA) IVPB, 400 mg, Intravenous, Once, 1 of 6 cycles     2022 -  Chemotherapy    alteplase (CATHFLO), 2 mg, Intracatheter, Every 2 hour PRN, 11 of 16 cycles  pembrolizumab (KEYTRUDA) IVPB, 200 mg, Intravenous, Once, 11 of 16 cycles  Administration: 200 mg (2022), 200 mg (2023), 200 mg (2023), 200 mg (2023), 200 mg (3/15/2023), 200 mg (2023), 200 mg (2023), 200 mg (2023), 200 mg (2023), 200 mg (2023), 200 mg (2023)         Interval History: Patient presents for follow up of his stage IV adenocarcinoma of the lung. He continues on pembrolizumab every 3 weeks. He had a CT scan prior to this visit that shows stable pulmonary nodules and stable scattered osseous metastasis. He is on Eliquis for a DVT.   He denies any bleeding. 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 His itching and rash is minor and controlled with topical therapy. No diarrhea. REVIEW OF SYSTEMS:  Please note that a 14-point review of systems was performed to include Constitutional, HEENT, Respiratory, CVS, GI, , Musculoskeletal, Integumentary, Neurologic, Rheumatologic, Endocrinologic, Psychiatric, Lymphatic, and Hematologic/Oncologic systems were reviewed and are negative unless otherwise stated in HPI. Positive and negative findings pertinent to this evaluation are incorporated into the history of present illness. 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 He continues to tolerate pembrolizumab well. I do not think the pembrolizumab is triggering the development of these multiple squamous cell carcinomas. In fact, we often treat squamous cell carcinoma with pembrolizumab. His disease remains stable. I will stretch his follow-up out to every 9 weeks and he can see my nurse practitioner at the next visit. I spent 20 minutes on chart review, face to face counseling time, coordination of care and documentation. Past Medical History:   has a past medical history of Diabetes mellitus ([address]), High blood pressure, and High cholesterol. PAST SURGICAL HISTORY:   has a past surgical history that includes Elbow surgery (Left, 2004).     CURRENT MEDICATIONS  Current Outpatient Medications   Medication Sig Dispense Refill   • apixaban (Eliquis) 5 mg Take 1 tablet (5 mg total) by mouth 2 (two) times a day 60 tablet 11   • Calcium Carb-Cholecalciferol (CALCIUM 1000 + D PO) Take by mouth     • Cholecalciferol (Vitamin D3) 1.25 MG (97282 UT) CAPS Take by mouth     • Contour Next Test test strip Test blood sugar once daily 100 strip 3   • dorzolamide (TRUSOPT) 2 % ophthalmic solution      • dorzolamide-timolol (COSOPT) 22.3-6.8 MG/ML ophthalmic solution INSTILL 1 DROP INTO EACH EYE TWICE DAILY     • glipiZIDE (GLUCOTROL XL) 2.5 mg 24 hr tablet Take 1 tablet by mouth once daily 90 tablet 0   • hydrochlorothiazide (HYDRODIURIL) 25 mg tablet Take 1 tablet (25 mg total) by mouth daily 90 tablet 3   • Lancet Devices (Microlet Next Lancing Device) MISC Use in the morning Test blood sugar once daily 1 each 0   • lisinopril (ZESTRIL) 20 mg tablet Take 1 tablet (20 mg total) by mouth daily 90 tablet 3   • lovastatin (MEVACOR) 20 mg tablet Take 1 tablet by mouth once daily 90 tablet 0   • methocarbamol (ROBAXIN) 500 mg tablet Take 1 tablet (500 mg total) by mouth 3 (three) times a day 20 tablet 0   • Microlet Lancets MISC Use to test glucose once a day 100 each 3   • mupirocin (BACTROBAN) 2 % ointment Apply topically 3 (three) times a day 22 g 1   • potassium chloride (K-DUR,KLOR-CON) 20 mEq tablet Take 1 tablet by mouth once daily 90 tablet 0   • bumetanide (BUMEX) 1 mg tablet Take 1 tablet (1 mg total) by mouth daily as needed (edema) (Patient not taking: Reported on 3/15/2023) 30 tablet 1   • methylPREDNISolone 4 MG tablet therapy pack Use as directed on package (Patient not taking: Reported on 2/22/2023) 1 each 0   • neomycin-polymyxin-hydrocortisone (CORTISPORIN) otic solution Administer 4 drops to the right ear every 8 (eight) hours (Patient not taking: Reported on 2/22/2023) 10 mL 0     No current facility-administered medications for this visit. [unfilled]    SOCIAL HISTORY:   reports that he quit smoking about 17 years ago.  His smoking use included cigarettes. He started smoking about 66 years ago. He has a 50.00 pack-year smoking history. He has never used smokeless tobacco. He reports current alcohol use of about 21.0 standard drinks of alcohol per week. He reports that he does not use drugs. FAMILY HISTORY:  family history includes Colon cancer in his father. ALLERGIES:  is allergic to aspirin and ibuprofen. Physical Exam:  Vital Signs:   Visit Vitals  /60 (BP Location: Left arm, Patient Position: Sitting, Cuff Size: Adult)   Pulse 64   Temp 98 °F (36.7 °C)   Resp 16   Ht 6' (1.829 m)   Wt 112 kg (246 lb)   SpO2 95%   BMI 33.36 kg/m²   Smoking Status Former   BSA 2.33 m²     Body mass index is 33.36 kg/m². Body surface area is 2.33 meters squared. GEN: Alert, awake oriented x3, in no acute distress  HEENT- No pallor, icterus, cyanosis, no oral mucosal lesions,   LAD - no palpable cervical, clavicle, axillary, inguinal LAD  Heart- normal S1 S2, regular rate and rhythm, No murmur, rubs.    Lungs- clear breathing sound bilateral.   Abdomen- soft, Non tender, bowel sounds present  Extremities- No cyanosis, clubbing, edema  Neuro- No focal neurological deficit    Labs:  Lab Results   Component Value Date    WBC 5.49 07/17/2023    HGB 15.4 07/17/2023    HCT 46.1 07/17/2023    MCV 93 07/17/2023     07/17/2023     Lab Results   Component Value Date    SODIUM 138 07/17/2023    K 3.8 07/17/2023     07/17/2023    CO2 28 07/17/2023    AGAP 6 07/17/2023    BUN 19 07/17/2023    CREATININE 0.83 07/17/2023    GLUC 187 (H) 07/17/2023    GLUF 188 (H) 01/30/2023    CALCIUM 8.9 07/17/2023    AST 18 07/17/2023    ALT 14 07/17/2023    ALKPHOS 95 07/17/2023    TP 7.2 07/17/2023    TBILI 0.98 07/17/2023    EGFR 83 07/17/2023

## 2023-08-07 ENCOUNTER — HOSPITAL ENCOUNTER (OUTPATIENT)
Dept: INFUSION CENTER | Facility: HOSPITAL | Age: 78
Discharge: HOME | End: 2023-08-07
Payer: MEDICARE

## 2023-08-07 DIAGNOSIS — C79.51 NSCLC METASTATIC TO BONE (HCC): Primary | ICD-10-CM

## 2023-08-07 DIAGNOSIS — C34.90 NSCLC METASTATIC TO BONE (HCC): Primary | ICD-10-CM

## 2023-08-07 LAB
ALBUMIN SERPL BCP-MCNC: 3.6 G/DL (ref 3.5–5)
ALP SERPL-CCNC: 82 U/L (ref 34–104)
ALT SERPL W P-5'-P-CCNC: 20 U/L (ref 7–52)
ANION GAP SERPL CALCULATED.3IONS-SCNC: 5 MMOL/L
AST SERPL W P-5'-P-CCNC: 22 U/L (ref 13–39)
BASOPHILS # BLD AUTO: 0.04 THOUSANDS/ÂΜL (ref 0–0.1)
BASOPHILS NFR BLD AUTO: 1 % (ref 0–1)
BILIRUB SERPL-MCNC: 0.76 MG/DL (ref 0.2–1)
BUN SERPL-MCNC: 17 MG/DL (ref 5–25)
CALCIUM SERPL-MCNC: 9 MG/DL (ref 8.4–10.2)
CHLORIDE SERPL-SCNC: 105 MMOL/L (ref 96–108)
CO2 SERPL-SCNC: 29 MMOL/L (ref 21–32)
CREAT SERPL-MCNC: 0.88 MG/DL (ref 0.6–1.3)
EOSINOPHIL # BLD AUTO: 0.16 THOUSAND/ÂΜL (ref 0–0.61)
EOSINOPHIL NFR BLD AUTO: 5 % (ref 0–6)
ERYTHROCYTE [DISTWIDTH] IN BLOOD BY AUTOMATED COUNT: 14.3 % (ref 11.6–15.1)
GFR SERPL CREATININE-BSD FRML MDRD: 81 ML/MIN/1.73SQ M
GLUCOSE SERPL-MCNC: 209 MG/DL (ref 65–140)
HCT VFR BLD AUTO: 44.4 % (ref 36.5–49.3)
HGB BLD-MCNC: 14.7 G/DL (ref 12–17)
IMM GRANULOCYTES # BLD AUTO: 0.01 THOUSAND/UL (ref 0–0.2)
IMM GRANULOCYTES NFR BLD AUTO: 0 % (ref 0–2)
LYMPHOCYTES # BLD AUTO: 0.47 THOUSANDS/ÂΜL (ref 0.6–4.47)
LYMPHOCYTES NFR BLD AUTO: 14 % (ref 14–44)
MCH RBC QN AUTO: 30.9 PG (ref 26.8–34.3)
MCHC RBC AUTO-ENTMCNC: 33.1 G/DL (ref 31.4–37.4)
MCV RBC AUTO: 94 FL (ref 82–98)
MONOCYTES # BLD AUTO: 0.34 THOUSAND/ÂΜL (ref 0.17–1.22)
MONOCYTES NFR BLD AUTO: 10 % (ref 4–12)
NEUTROPHILS # BLD AUTO: 2.43 THOUSANDS/ÂΜL (ref 1.85–7.62)
NEUTS SEG NFR BLD AUTO: 70 % (ref 43–75)
NRBC BLD AUTO-RTO: 0 /100 WBCS
PLATELET # BLD AUTO: 158 THOUSANDS/UL (ref 149–390)
PMV BLD AUTO: 11 FL (ref 8.9–12.7)
POTASSIUM SERPL-SCNC: 3.7 MMOL/L (ref 3.5–5.3)
PROT SERPL-MCNC: 6.8 G/DL (ref 6.4–8.4)
RBC # BLD AUTO: 4.75 MILLION/UL (ref 3.88–5.62)
SODIUM SERPL-SCNC: 139 MMOL/L (ref 135–147)
T3FREE SERPL-MCNC: 3.1 PG/ML (ref 2.5–3.9)
TSH SERPL DL<=0.05 MIU/L-ACNC: 1.05 UIU/ML (ref 0.45–4.5)
WBC # BLD AUTO: 3.45 THOUSAND/UL (ref 4.31–10.16)

## 2023-08-07 PROCEDURE — 84481 FREE ASSAY (FT-3): CPT | Performed by: INTERNAL MEDICINE

## 2023-08-07 PROCEDURE — 80053 COMPREHEN METABOLIC PANEL: CPT | Performed by: INTERNAL MEDICINE

## 2023-08-07 PROCEDURE — 84443 ASSAY THYROID STIM HORMONE: CPT | Performed by: INTERNAL MEDICINE

## 2023-08-07 PROCEDURE — 85025 COMPLETE CBC W/AUTO DIFF WBC: CPT | Performed by: INTERNAL MEDICINE

## 2023-08-09 ENCOUNTER — HOSPITAL ENCOUNTER (OUTPATIENT)
Dept: INFUSION CENTER | Facility: HOSPITAL | Age: 78
Discharge: HOME | End: 2023-08-09
Attending: INTERNAL MEDICINE
Payer: MEDICARE

## 2023-08-09 VITALS
OXYGEN SATURATION: 95 % | BODY MASS INDEX: 33.26 KG/M2 | RESPIRATION RATE: 16 BRPM | SYSTOLIC BLOOD PRESSURE: 132 MMHG | HEIGHT: 72 IN | DIASTOLIC BLOOD PRESSURE: 69 MMHG | WEIGHT: 245.59 LBS | HEART RATE: 65 BPM | TEMPERATURE: 97.3 F

## 2023-08-09 DIAGNOSIS — C34.90 NSCLC METASTATIC TO BONE (HCC): Primary | ICD-10-CM

## 2023-08-09 DIAGNOSIS — C79.51 NSCLC METASTATIC TO BONE (HCC): Primary | ICD-10-CM

## 2023-08-09 RX ORDER — SODIUM CHLORIDE 9 MG/ML
20 INJECTION, SOLUTION INTRAVENOUS ONCE
Status: COMPLETED | OUTPATIENT
Start: 2023-08-09 | End: 2023-08-09

## 2023-08-09 RX ADMIN — SODIUM CHLORIDE 20 ML/HR: 9 INJECTION, SOLUTION INTRAVENOUS at 08:50

## 2023-08-09 RX ADMIN — SODIUM CHLORIDE 200 MG: 9 INJECTION, SOLUTION INTRAVENOUS at 08:51

## 2023-08-09 NOTE — PROGRESS NOTES
Patient completed infusion with no adverse reactions. Declined aVS, left unit ambulatory with steady gait.

## 2023-08-22 ENCOUNTER — OFFICE VISIT (OUTPATIENT)
Dept: FAMILY MEDICINE CLINIC | Facility: HOSPITAL | Age: 78
End: 2023-08-22
Payer: MEDICARE

## 2023-08-22 VITALS
TEMPERATURE: 97.6 F | BODY MASS INDEX: 33.21 KG/M2 | WEIGHT: 245.2 LBS | HEIGHT: 72 IN | HEART RATE: 83 BPM | SYSTOLIC BLOOD PRESSURE: 124 MMHG | DIASTOLIC BLOOD PRESSURE: 65 MMHG | OXYGEN SATURATION: 95 %

## 2023-08-22 DIAGNOSIS — J43.9 PULMONARY EMPHYSEMA, UNSPECIFIED EMPHYSEMA TYPE (HCC): ICD-10-CM

## 2023-08-22 DIAGNOSIS — C34.90 NSCLC METASTATIC TO BONE (HCC): ICD-10-CM

## 2023-08-22 DIAGNOSIS — K76.0 FATTY LIVER: ICD-10-CM

## 2023-08-22 DIAGNOSIS — E78.2 MIXED HYPERLIPIDEMIA: ICD-10-CM

## 2023-08-22 DIAGNOSIS — C79.51 NSCLC METASTATIC TO BONE (HCC): ICD-10-CM

## 2023-08-22 DIAGNOSIS — E04.1 LEFT THYROID NODULE: ICD-10-CM

## 2023-08-22 DIAGNOSIS — I10 ESSENTIAL HYPERTENSION: ICD-10-CM

## 2023-08-22 DIAGNOSIS — Z93.3 COLOSTOMY IN PLACE (HCC): ICD-10-CM

## 2023-08-22 DIAGNOSIS — E11.65 TYPE 2 DIABETES MELLITUS WITH HYPERGLYCEMIA, WITHOUT LONG-TERM CURRENT USE OF INSULIN (HCC): Primary | ICD-10-CM

## 2023-08-22 PROCEDURE — 99214 OFFICE O/P EST MOD 30 MIN: CPT | Performed by: FAMILY MEDICINE

## 2023-08-22 NOTE — PROGRESS NOTES
Name: Linda Lacy      : 1945      MRN: [id number]  Encounter Provider: Elizabeth Niño MD  Encounter Date: 2023   Encounter department: [address]     1. Type 2 diabetes mellitus with hyperglycemia, without long-term current use of insulin [facility])  Assessment & Plan:    Lab Results   Component Value Date    HGBA1C 6.1 (H) 2023         2. 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 Essential hypertension  Assessment & Plan:  Excellent BP control      4. Colostomy in place ([address])    5. Mixed hyperlipidemia    6. Fatty liver    7. Left thyroid nodule    8. Pulmonary emphysema, unspecified emphysema type ([address])  Assessment & Plan:  Stable pulmonary status           Subjective     4 month follow up. Review of Systems    Past Medical History:   Diagnosis Date   • Diabetes mellitus ([address])    • High blood pressure    • High cholesterol      Past Surgical History:   Procedure Laterality Date   • ELBOW SURGERY Left     pinched nerve     Family History   Problem Relation Age of Onset   • Colon cancer Father      Social History     Socioeconomic History   • Marital status: /Civil Union     Spouse name: None   • Number of children: None   • Years of education: None   • Highest education level: None   Occupational History   • None   Tobacco Use   • Smoking status: Former     Packs/day: 1.00     Years: 50.00     Total pack years: 50.00     Types: Cigarettes     Start date: 35     Quit date:      Years since quittin.6   • Smokeless tobacco: Never   Vaping Use   • Vaping Use: Never used   Substance and Sexual Activity   • Alcohol use:  Yes     Alcohol/week: 21.0 standard drinks of alcohol     Types: 21 Glasses of wine per week     Comment: social drinker   • Drug use: Never   • Sexual activity: Not Currently     Partners: Female   Other Topics Concern   • None   Social History Narrative   • None     Social Determinants of Health Financial Resource Strain: Low Risk  (10/17/2022)    Overall Financial Resource Strain (CARDIA)    • Difficulty of Paying Living Expenses: Not hard at all   Food Insecurity: Not on file   Transportation Needs: No Transportation Needs (10/17/2022)    PRAPARE - Transportation    • Lack of Transportation (Medical): No    • Lack of Transportation (Non-Medical):  No   Physical Activity: Not on file   Stress: Not on file   Social Connections: Not on file   Intimate Partner Violence: Not on file   Housing Stability: Not on file     Current Outpatient Medications on File Prior to Visit   Medication Sig   • apixaban (Eliquis) 5 mg Take 1 tablet (5 mg total) by mouth 2 (two) times a day (Patient taking differently: Take 5 mg by mouth in the morning)   • Calcium Carb-Cholecalciferol (CALCIUM 1000 + D PO) Take by mouth   • Cholecalciferol (Vitamin D3) 1.25 MG (91425 UT) CAPS Take by mouth   • Contour Next Test test strip Test blood sugar once daily   • dorzolamide (TRUSOPT) 2 % ophthalmic solution    • dorzolamide-timolol (COSOPT) 22.3-6.8 MG/ML ophthalmic solution INSTILL 1 DROP INTO EACH EYE TWICE DAILY   • glipiZIDE (GLUCOTROL XL) 2.5 mg 24 hr tablet Take 1 tablet by mouth once daily   • hydrochlorothiazide (HYDRODIURIL) 25 mg tablet Take 1 tablet (25 mg total) by mouth daily   • lisinopril (ZESTRIL) 20 mg tablet Take 1 tablet (20 mg total) by mouth daily   • lovastatin (MEVACOR) 20 mg tablet Take 1 tablet by mouth once daily   • methocarbamol (ROBAXIN) 500 mg tablet Take 1 tablet (500 mg total) by mouth 3 (three) times a day   • mupirocin (BACTROBAN) 2 % ointment Apply topically 3 (three) times a day   • potassium chloride (K-DUR,KLOR-CON) 20 mEq tablet Take 1 tablet by mouth once daily   • methylPREDNISolone 4 MG tablet therapy pack Use as directed on package (Patient not taking: Reported on 2/22/2023)   • neomycin-polymyxin-hydrocortisone (CORTISPORIN) otic solution Administer 4 drops to the right ear every 8 (eight) hours (Patient not taking: Reported on 2/22/2023)     Allergies   Allergen Reactions   • Aspirin Hives   • Ibuprofen Hives     Immunization History   Administered Date(s) Administered   • COVID-19 MODERNA VACC 0.5 ML IM 01/30/2021, 02/27/2021, 01/04/2022   • Influenza, high dose seasonal 0.7 mL 10/17/2022   • Zoster 05/08/2023, 07/14/2023       Objective     /65 (BP Location: Left arm, Patient Position: Sitting, Cuff Size: Large)   Pulse 83   Temp 97.6 °F (36.4 °C) (Tympanic)   Ht 6' (1.829 m)   Wt 111 kg (245 lb 3.2 oz)   SpO2 95%   BMI 33.26 kg/m²     Physical Exam  Vitals and nursing note reviewed. Constitutional:       Appearance: Normal appearance. Cardiovascular:      Rate and Rhythm: Normal rate and regular rhythm. Pulses: no weak pulses          Dorsalis pedis pulses are 1+ on the right side and 1+ on the left side. Posterior tibial pulses are 1+ on the right side and 1+ on the left side. Heart sounds: Normal heart sounds. Pulmonary:      Effort: Pulmonary effort is normal.      Breath sounds: Normal breath sounds. Musculoskeletal:      Right lower leg: No edema. Left lower leg: No edema. Feet:      Right foot:      Skin integrity: No ulcer, skin breakdown, erythema, warmth, callus or dry skin. Left foot:      Skin integrity: No ulcer, skin breakdown, erythema, warmth, callus or dry skin. Neurological:      Mental Status: He is oriented to person, place, and time. Psychiatric:         Mood and Affect: Mood normal.          Patient's shoes and socks removed. Right Foot/Ankle   Right Foot Inspection  Skin Exam: skin normal and skin intact. No dry skin, no warmth, no callus, no erythema, no maceration, no abnormal color, no pre-ulcer, no ulcer and no callus. Toe Exam: ROM and strength within normal limits.      Sensory   Vibration: diminished  Proprioception: intact  Monofilament testing: diminished    Vascular  Capillary refills: < 3 seconds  The right DP pulse is 1+. The right PT pulse is 1+. Left Foot/Ankle  Left Foot Inspection  Skin Exam: skin normal and skin intact. No dry skin, no warmth, no erythema, no maceration, normal color, no pre-ulcer, no ulcer and no callus. Toe Exam: ROM and strength within normal limits. Sensory   Vibration: diminished  Proprioception: intact  Monofilament testing: diminished    Vascular  Capillary refills: < 3 seconds  The left DP pulse is 1+. The left PT pulse is 1+.      Assign Risk Category  No deformity present  Loss of protective sensation  No weak pulses  Risk: 1      Radhames Acosta MD

## 2023-08-28 ENCOUNTER — HOSPITAL ENCOUNTER (OUTPATIENT)
Dept: INFUSION CENTER | Facility: HOSPITAL | Age: 78
Discharge: HOME | End: 2023-08-28
Payer: MEDICARE

## 2023-08-28 DIAGNOSIS — C79.51 NSCLC METASTATIC TO BONE (HCC): ICD-10-CM

## 2023-08-28 DIAGNOSIS — E11.65 TYPE 2 DIABETES MELLITUS WITH HYPERGLYCEMIA, WITHOUT LONG-TERM CURRENT USE OF INSULIN (HCC): Primary | ICD-10-CM

## 2023-08-28 DIAGNOSIS — C34.90 NSCLC METASTATIC TO BONE (HCC): ICD-10-CM

## 2023-08-28 DIAGNOSIS — E78.2 MIXED HYPERLIPIDEMIA: ICD-10-CM

## 2023-08-28 LAB
ALBUMIN SERPL BCP-MCNC: 3.8 G/DL (ref 3.5–5)
ALP SERPL-CCNC: 83 U/L (ref 34–104)
ALT SERPL W P-5'-P-CCNC: 24 U/L (ref 7–52)
ANION GAP SERPL CALCULATED.3IONS-SCNC: 5 MMOL/L
AST SERPL W P-5'-P-CCNC: 27 U/L (ref 13–39)
BASOPHILS # BLD AUTO: 0.04 THOUSANDS/ÂΜL (ref 0–0.1)
BASOPHILS NFR BLD AUTO: 1 % (ref 0–1)
BILIRUB SERPL-MCNC: 0.87 MG/DL (ref 0.2–1)
BUN SERPL-MCNC: 19 MG/DL (ref 5–25)
CALCIUM SERPL-MCNC: 9.2 MG/DL (ref 8.4–10.2)
CHLORIDE SERPL-SCNC: 106 MMOL/L (ref 96–108)
CHOLEST SERPL-MCNC: 122 MG/DL
CO2 SERPL-SCNC: 30 MMOL/L (ref 21–32)
CREAT SERPL-MCNC: 0.88 MG/DL (ref 0.6–1.3)
EOSINOPHIL # BLD AUTO: 0.18 THOUSAND/ÂΜL (ref 0–0.61)
EOSINOPHIL NFR BLD AUTO: 4 % (ref 0–6)
ERYTHROCYTE [DISTWIDTH] IN BLOOD BY AUTOMATED COUNT: 14.5 % (ref 11.6–15.1)
EST. AVERAGE GLUCOSE BLD GHB EST-MCNC: 128 MG/DL
GFR SERPL CREATININE-BSD FRML MDRD: 81 ML/MIN/1.73SQ M
GLUCOSE SERPL-MCNC: 182 MG/DL (ref 65–140)
HBA1C MFR BLD: 6.1 %
HCT VFR BLD AUTO: 45.9 % (ref 36.5–49.3)
HDLC SERPL-MCNC: 43 MG/DL
HGB BLD-MCNC: 15.2 G/DL (ref 12–17)
IMM GRANULOCYTES # BLD AUTO: 0.02 THOUSAND/UL (ref 0–0.2)
IMM GRANULOCYTES NFR BLD AUTO: 0 % (ref 0–2)
LDLC SERPL CALC-MCNC: 39 MG/DL (ref 0–100)
LYMPHOCYTES # BLD AUTO: 0.5 THOUSANDS/ÂΜL (ref 0.6–4.47)
LYMPHOCYTES NFR BLD AUTO: 10 % (ref 14–44)
MCH RBC QN AUTO: 31.3 PG (ref 26.8–34.3)
MCHC RBC AUTO-ENTMCNC: 33.1 G/DL (ref 31.4–37.4)
MCV RBC AUTO: 94 FL (ref 82–98)
MONOCYTES # BLD AUTO: 0.45 THOUSAND/ÂΜL (ref 0.17–1.22)
MONOCYTES NFR BLD AUTO: 9 % (ref 4–12)
NEUTROPHILS # BLD AUTO: 3.62 THOUSANDS/ÂΜL (ref 1.85–7.62)
NEUTS SEG NFR BLD AUTO: 76 % (ref 43–75)
NRBC BLD AUTO-RTO: 0 /100 WBCS
PLATELET # BLD AUTO: 184 THOUSANDS/UL (ref 149–390)
PMV BLD AUTO: 11.2 FL (ref 8.9–12.7)
POTASSIUM SERPL-SCNC: 3.6 MMOL/L (ref 3.5–5.3)
PROT SERPL-MCNC: 7.1 G/DL (ref 6.4–8.4)
RBC # BLD AUTO: 4.86 MILLION/UL (ref 3.88–5.62)
SODIUM SERPL-SCNC: 141 MMOL/L (ref 135–147)
T3FREE SERPL-MCNC: 3.4 PG/ML (ref 2.5–3.9)
TRIGL SERPL-MCNC: 201 MG/DL
TSH SERPL DL<=0.05 MIU/L-ACNC: 1.38 UIU/ML (ref 0.45–4.5)
WBC # BLD AUTO: 4.81 THOUSAND/UL (ref 4.31–10.16)

## 2023-08-28 PROCEDURE — 80053 COMPREHEN METABOLIC PANEL: CPT | Performed by: INTERNAL MEDICINE

## 2023-08-28 PROCEDURE — 84443 ASSAY THYROID STIM HORMONE: CPT | Performed by: INTERNAL MEDICINE

## 2023-08-28 PROCEDURE — 84481 FREE ASSAY (FT-3): CPT | Performed by: INTERNAL MEDICINE

## 2023-08-28 PROCEDURE — 83036 HEMOGLOBIN GLYCOSYLATED A1C: CPT

## 2023-08-28 PROCEDURE — 80061 LIPID PANEL: CPT

## 2023-08-28 PROCEDURE — 85025 COMPLETE CBC W/AUTO DIFF WBC: CPT | Performed by: INTERNAL MEDICINE

## 2023-08-30 ENCOUNTER — HOSPITAL ENCOUNTER (OUTPATIENT)
Dept: INFUSION CENTER | Facility: HOSPITAL | Age: 78
Discharge: HOME | End: 2023-08-30
Attending: INTERNAL MEDICINE
Payer: MEDICARE

## 2023-08-30 VITALS
OXYGEN SATURATION: 95 % | WEIGHT: 247.36 LBS | SYSTOLIC BLOOD PRESSURE: 136 MMHG | TEMPERATURE: 97.7 F | BODY MASS INDEX: 33.55 KG/M2 | HEART RATE: 64 BPM | DIASTOLIC BLOOD PRESSURE: 66 MMHG | RESPIRATION RATE: 16 BRPM

## 2023-08-30 DIAGNOSIS — C79.51 NSCLC METASTATIC TO BONE (HCC): Primary | ICD-10-CM

## 2023-08-30 DIAGNOSIS — C34.90 NSCLC METASTATIC TO BONE (HCC): Primary | ICD-10-CM

## 2023-08-30 RX ORDER — SODIUM CHLORIDE 9 MG/ML
20 INJECTION, SOLUTION INTRAVENOUS ONCE
Status: COMPLETED | OUTPATIENT
Start: 2023-08-30 | End: 2023-08-30

## 2023-08-30 RX ADMIN — SODIUM CHLORIDE 20 ML/HR: 9 INJECTION, SOLUTION INTRAVENOUS at 09:00

## 2023-08-30 RX ADMIN — SODIUM CHLORIDE 200 MG: 9 INJECTION, SOLUTION INTRAVENOUS at 09:58

## 2023-08-30 NOTE — PROGRESS NOTES
Patient Keytruda infusion completed without complication. Patient declined AVS at this time and confirmed next appointment. Patient discharged in stable condition to home via ambulation.

## 2023-08-31 DIAGNOSIS — E11.65 TYPE 2 DIABETES MELLITUS WITH HYPERGLYCEMIA, WITHOUT LONG-TERM CURRENT USE OF INSULIN (HCC): ICD-10-CM

## 2023-08-31 RX ORDER — LANCING DEVICE/LANCETS
KIT MISCELLANEOUS
Qty: 1 EACH | Refills: 0 | Status: SHIPPED | OUTPATIENT
Start: 2023-08-31

## 2023-09-03 DIAGNOSIS — E11.65 TYPE 2 DIABETES MELLITUS WITH HYPERGLYCEMIA, WITHOUT LONG-TERM CURRENT USE OF INSULIN (HCC): ICD-10-CM

## 2023-09-03 RX ORDER — LANCETS
EACH MISCELLANEOUS
Qty: 100 EACH | Refills: 0 | Status: SHIPPED | OUTPATIENT
Start: 2023-09-03

## 2023-09-18 ENCOUNTER — HOSPITAL ENCOUNTER (OUTPATIENT)
Dept: INFUSION CENTER | Facility: HOSPITAL | Age: 78
Discharge: HOME | End: 2023-09-18
Payer: MEDICARE

## 2023-09-18 DIAGNOSIS — C79.51 NSCLC METASTATIC TO BONE (HCC): Primary | ICD-10-CM

## 2023-09-18 DIAGNOSIS — C34.90 NSCLC METASTATIC TO BONE (HCC): Primary | ICD-10-CM

## 2023-09-18 LAB
ALBUMIN SERPL BCP-MCNC: 3.6 G/DL (ref 3.5–5)
ALP SERPL-CCNC: 86 U/L (ref 34–104)
ALT SERPL W P-5'-P-CCNC: 25 U/L (ref 7–52)
ANION GAP SERPL CALCULATED.3IONS-SCNC: 5 MMOL/L
AST SERPL W P-5'-P-CCNC: 24 U/L (ref 13–39)
BASOPHILS # BLD AUTO: 0.04 THOUSANDS/ÂΜL (ref 0–0.1)
BASOPHILS NFR BLD AUTO: 1 % (ref 0–1)
BILIRUB SERPL-MCNC: 0.66 MG/DL (ref 0.2–1)
BUN SERPL-MCNC: 16 MG/DL (ref 5–25)
CALCIUM SERPL-MCNC: 9 MG/DL (ref 8.4–10.2)
CHLORIDE SERPL-SCNC: 108 MMOL/L (ref 96–108)
CO2 SERPL-SCNC: 29 MMOL/L (ref 21–32)
CREAT SERPL-MCNC: 0.82 MG/DL (ref 0.6–1.3)
EOSINOPHIL # BLD AUTO: 0.14 THOUSAND/ÂΜL (ref 0–0.61)
EOSINOPHIL NFR BLD AUTO: 3 % (ref 0–6)
ERYTHROCYTE [DISTWIDTH] IN BLOOD BY AUTOMATED COUNT: 14.5 % (ref 11.6–15.1)
GFR SERPL CREATININE-BSD FRML MDRD: 84 ML/MIN/1.73SQ M
GLUCOSE SERPL-MCNC: 197 MG/DL (ref 65–140)
HCT VFR BLD AUTO: 44.7 % (ref 36.5–49.3)
HGB BLD-MCNC: 14.7 G/DL (ref 12–17)
IMM GRANULOCYTES # BLD AUTO: 0.02 THOUSAND/UL (ref 0–0.2)
IMM GRANULOCYTES NFR BLD AUTO: 1 % (ref 0–2)
LYMPHOCYTES # BLD AUTO: 0.48 THOUSANDS/ÂΜL (ref 0.6–4.47)
LYMPHOCYTES NFR BLD AUTO: 12 % (ref 14–44)
MCH RBC QN AUTO: 31.1 PG (ref 26.8–34.3)
MCHC RBC AUTO-ENTMCNC: 32.9 G/DL (ref 31.4–37.4)
MCV RBC AUTO: 95 FL (ref 82–98)
MONOCYTES # BLD AUTO: 0.38 THOUSAND/ÂΜL (ref 0.17–1.22)
MONOCYTES NFR BLD AUTO: 9 % (ref 4–12)
NEUTROPHILS # BLD AUTO: 3 THOUSANDS/ÂΜL (ref 1.85–7.62)
NEUTS SEG NFR BLD AUTO: 74 % (ref 43–75)
NRBC BLD AUTO-RTO: 0 /100 WBCS
PLATELET # BLD AUTO: 165 THOUSANDS/UL (ref 149–390)
PMV BLD AUTO: 11 FL (ref 8.9–12.7)
POTASSIUM SERPL-SCNC: 3.6 MMOL/L (ref 3.5–5.3)
PROT SERPL-MCNC: 6.9 G/DL (ref 6.4–8.4)
RBC # BLD AUTO: 4.72 MILLION/UL (ref 3.88–5.62)
SODIUM SERPL-SCNC: 142 MMOL/L (ref 135–147)
T3FREE SERPL-MCNC: 2.92 PG/ML (ref 2.5–3.9)
TSH SERPL DL<=0.05 MIU/L-ACNC: 1.18 UIU/ML (ref 0.45–4.5)
WBC # BLD AUTO: 4.06 THOUSAND/UL (ref 4.31–10.16)

## 2023-09-18 PROCEDURE — 85025 COMPLETE CBC W/AUTO DIFF WBC: CPT | Performed by: INTERNAL MEDICINE

## 2023-09-18 PROCEDURE — 84481 FREE ASSAY (FT-3): CPT | Performed by: INTERNAL MEDICINE

## 2023-09-18 PROCEDURE — 84443 ASSAY THYROID STIM HORMONE: CPT | Performed by: INTERNAL MEDICINE

## 2023-09-18 PROCEDURE — 80053 COMPREHEN METABOLIC PANEL: CPT | Performed by: INTERNAL MEDICINE

## 2023-09-20 ENCOUNTER — HOSPITAL ENCOUNTER (OUTPATIENT)
Dept: INFUSION CENTER | Facility: HOSPITAL | Age: 78
Discharge: HOME | End: 2023-09-20
Attending: INTERNAL MEDICINE
Payer: MEDICARE

## 2023-09-20 VITALS
DIASTOLIC BLOOD PRESSURE: 58 MMHG | HEART RATE: 63 BPM | SYSTOLIC BLOOD PRESSURE: 126 MMHG | OXYGEN SATURATION: 98 % | RESPIRATION RATE: 16 BRPM | TEMPERATURE: 97.3 F

## 2023-09-20 DIAGNOSIS — C79.51 NSCLC METASTATIC TO BONE (HCC): Primary | ICD-10-CM

## 2023-09-20 DIAGNOSIS — C34.90 NSCLC METASTATIC TO BONE (HCC): Primary | ICD-10-CM

## 2023-09-20 PROCEDURE — 96413 CHEMO IV INFUSION 1 HR: CPT

## 2023-09-20 RX ORDER — SODIUM CHLORIDE 9 MG/ML
20 INJECTION, SOLUTION INTRAVENOUS ONCE
Status: COMPLETED | OUTPATIENT
Start: 2023-09-20 | End: 2023-09-20

## 2023-09-20 RX ADMIN — SODIUM CHLORIDE 200 MG: 9 INJECTION, SOLUTION INTRAVENOUS at 09:09

## 2023-09-20 RX ADMIN — SODIUM CHLORIDE 20 ML/HR: 9 INJECTION, SOLUTION INTRAVENOUS at 08:30

## 2023-09-20 NOTE — PROGRESS NOTES
Pt tolerated Keytruda infusion with no adverse reaction. Left unit ambulatory with steady gait.  Refused AVS.

## 2023-09-20 NOTE — PLAN OF CARE
Problem: Potential for Falls  Goal: Patient will remain free of falls  Description: INTERVENTIONS:  - Educate patient/family on patient safety including physical limitations  - Instruct patient to call for assistance with activity   - Consult OT/PT to assist with strengthening/mobility   - Keep Call bell within reach  - Keep bed low and locked with side rails adjusted as appropriate  - Keep care items and personal belongings within reach  - Initiate and maintain comfort rounds  - Make Fall Risk Sign visible to staff  - Offer Toileting every Hours, in advance of need  - Obtain necessary fall risk management equipment  - Apply yellow socks and bracelet for high fall risk patients  - Consider moving patient to room near nurses station  Outcome: Progressing

## 2023-10-01 DIAGNOSIS — E11.65 TYPE 2 DIABETES MELLITUS WITH HYPERGLYCEMIA, WITHOUT LONG-TERM CURRENT USE OF INSULIN (HCC): ICD-10-CM

## 2023-10-01 RX ORDER — PERPHENAZINE 16 MG/1
TABLET, FILM COATED ORAL
Qty: 100 EACH | Refills: 0 | Status: SHIPPED | OUTPATIENT
Start: 2023-10-01

## 2023-10-02 DIAGNOSIS — C79.51 NSCLC METASTATIC TO BONE (HCC): ICD-10-CM

## 2023-10-02 DIAGNOSIS — E87.6 HYPOKALEMIA: ICD-10-CM

## 2023-10-02 DIAGNOSIS — C34.90 NSCLC METASTATIC TO BONE (HCC): ICD-10-CM

## 2023-10-02 RX ORDER — POTASSIUM CHLORIDE 20 MEQ/1
TABLET, EXTENDED RELEASE ORAL
Qty: 90 TABLET | Refills: 0 | Status: SHIPPED | OUTPATIENT
Start: 2023-10-02

## 2023-10-06 NOTE — PROGRESS NOTES
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 ([address])   3/1/2018 -  Cancer Staged    Staging form: Lung, AJCC 8th Edition  - Clinical stage from 3/1/2018: Stage IVB (cTX, cN2, cM1c) - Signed by Brianna Cai DO on 2022 Initial Diagnosis    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 ([address])     2022 - 2022 Chemotherapy    pembrolizumab (KEYTRUDA) IVPB, 200 mg, Intravenous, Once, 10 of 13 cycles  Administration: 200 mg (2022), 200 mg (6/15/2022), 200 mg (2022), 200 mg (2022), 200 mg (2022), 200 mg (2022), 200 mg (2022), 200 mg (10/19/2022), 200 mg (2022), 200 mg (2022)     2022 - 2022 Chemotherapy    pembrolizumab (KEYTRUDA) IVPB, 400 mg, Intravenous, Once, 1 of 6 cycles     2022 -  Chemotherapy    alteplase (CATHFLO), 2 mg, Intracatheter, Every 2 hour PRN, 15 of 20 cycles  pembrolizumab (KEYTRUDA) IVPB, 200 mg, Intravenous, Once, 15 of 20 cycles  Administration: 200 mg (2022), 200 mg (2023), 200 mg (2023), 200 mg (2023), 200 mg (3/15/2023), 200 mg (2023), 200 mg (2023), 200 mg (2023), 200 mg (2023), 200 mg (2023), 200 mg (2023), 200 mg (2023), 200 mg (2023), 200 mg (2023)         Interval History: Patient returns for follow-up visit. He was last seen by Dr. Lady Hudson on 2023. He continues on maintenance Pembro every 3 weeks and overall tolerates well.   However, he still experiencing rash on his chest wall. He scratched it and it appears red and scabbed over. I am going to prescribe a course of Keflex to help this heal in prevent any worsening infection. He has not had any fevers or chills. Continues to follow with dermatology for squamous cell skin lesions on forearms that require treatment  Blood work remains in acceptable range. Denies any nausea/vomiting/diarrhea. Denies chest pain, cough, shortness of breath. REVIEW OF SYSTEMS:  Please note that a 14-point review of systems was performed to include Constitutional, HEENT, Respiratory, CVS, GI, , Musculoskeletal, Integumentary, Neurologic, Rheumatologic, Endocrinologic, Psychiatric, Lymphatic, and Hematologic/Oncologic systems were reviewed and are negative unless otherwise stated in HPI. Positive and negative findings pertinent to this evaluation are incorporated into the history of present illness. 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 Rash      Patient has persistent rash that is essentially stable. However appears reddened and irritated from scratching. I am prescribing a course of Keflex for this. He will proceed with treatment today as scheduled without change. We did discuss treatment holiday since he has been on Keytruda for several years. However he wishes to continue on his current regimen without change. Overall he reports he tolerates this well and he is comfortable knowing it has kept his disease stable.     He will continue on his current regimen without change. He will return for a follow-up visit in 9 weeks. I asked him to call and let me know how rash improves on Keflex. We also discussed various other topical ointments to use to help with the itch to prevent him from scratching. Patient verbalized understanding was in agreement with this plan of care. He knows to call anytime with other questions or concerns prior to his next visit    I spent 30 minutes on chart review, face to face counseling time, coordination of care and documentation. Past Medical History:   has a past medical history of Diabetes mellitus ([address]), High blood pressure, and High cholesterol. PAST SURGICAL HISTORY:   has a past surgical history that includes Elbow surgery (Left, 2004).     CURRENT MEDICATIONS  Current Outpatient Medications   Medication Sig Dispense Refill    apixaban (Eliquis) 5 mg Take 1 tablet (5 mg total) by mouth 2 (two) times a day (Patient taking differently: Take 5 mg by mouth in the morning) 60 tablet 11    Calcium Carb-Cholecalciferol (CALCIUM 1000 + D PO) Take by mouth      cephalexin (KEFLEX) 500 mg capsule Take 1 capsule (500 mg total) by mouth every 6 (six) hours for 7 days 28 capsule 0    Cholecalciferol (Vitamin D3) 1.25 MG (99128 UT) CAPS Take by mouth      Contour Next Test test strip USE 1 STRIP TO CHECK GLUCOSE ONCE DAILY 100 each 0    dorzolamide (TRUSOPT) 2 % ophthalmic solution       dorzolamide-timolol (COSOPT) 22.3-6.8 MG/ML ophthalmic solution INSTILL 1 DROP INTO EACH EYE TWICE DAILY      glipiZIDE (GLUCOTROL XL) 2.5 mg 24 hr tablet Take 1 tablet by mouth once daily 90 tablet 0    hydrochlorothiazide (HYDRODIURIL) 25 mg tablet Take 1 tablet (25 mg total) by mouth daily 90 tablet 3    lisinopril (ZESTRIL) 20 mg tablet Take 1 tablet (20 mg total) by mouth daily 90 tablet 3    lovastatin (MEVACOR) 20 mg tablet Take 1 tablet by mouth once daily 90 tablet 0    methocarbamol (ROBAXIN) 500 mg tablet Take 1 tablet (500 mg total) by mouth 3 (three) times a day 20 tablet 0    mupirocin (BACTROBAN) 2 % ointment Apply topically 3 (three) times a day 22 g 1    potassium chloride (K-DUR,KLOR-CON) 20 mEq tablet Take 1 tablet by mouth once daily 90 tablet 0    Lancet Devices (Microlet Next Lancing Device) MISC USE ONCE DAILY IN THE MORNING TO TEST BLOOD SUGAR 1 each 0    methylPREDNISolone 4 MG tablet therapy pack Use as directed on package (Patient not taking: Reported on 2/22/2023) 1 each 0    Microlet Lancets MISC USE 1  TO CHECK GLUCOSE ONCE DAILY 100 each 0    neomycin-polymyxin-hydrocortisone (CORTISPORIN) otic solution Administer 4 drops to the right ear every 8 (eight) hours (Patient not taking: Reported on 2/22/2023) 10 mL 0     No current facility-administered medications for this visit. Facility-Administered Medications Ordered in Other Visits   Medication Dose Route Frequency Provider Last Rate Last Admin    alteplase (CATHFLO) injection 2 mg  2 mg Intracatheter Q2H PRN Jason Reyez DO        pembrolizumab [facility]) 200 mg in sodium chloride 0.9 % 50 mL IVPB  200 mg Intravenous Once Jason Reyez  mL/hr at 10/11/23 0909 200 mg at 10/11/23 0909     [unfilled]    SOCIAL HISTORY:   reports that he quit smoking about 17 years ago. His smoking use included cigarettes. He started smoking about 66 years ago. He has a 50.00 pack-year smoking history. He has never used smokeless tobacco. He reports current alcohol use of about 21.0 standard drinks of alcohol per week. He reports that he does not use drugs. FAMILY HISTORY:  family history includes Colon cancer in his father. ALLERGIES:  is allergic to aspirin and ibuprofen.       Physical Exam:  Vital Signs:   Visit Vitals  /70 (BP Location: Left arm, Patient Position: Sitting, Cuff Size: Standard)   Pulse 70   Temp 97.7 °F (36.5 °C) (Temporal)   Resp 16   Ht 6' (1.829 m)   Wt 114 kg (251 lb)   SpO2 96%   BMI 34.04 kg/m²   Smoking Status Former   BSA 2.35 m² Body mass index is 34.04 kg/m². Body surface area is 2.35 meters squared. Physical Exam  Constitutional:       Appearance: He is not toxic-appearing. HENT:      Head: Normocephalic and atraumatic. Mouth/Throat:      Pharynx: No oropharyngeal exudate. Eyes:      General: No scleral icterus. Cardiovascular:      Rate and Rhythm: Normal rate and regular rhythm. Pulmonary:      Effort: Pulmonary effort is normal.      Breath sounds: Normal breath sounds. Abdominal:      General: Bowel sounds are normal.      Palpations: Abdomen is soft. Musculoskeletal:         General: Normal range of motion. Cervical back: Normal range of motion. Skin:     Findings: Lesion (on forearms (recently treated by Derm)) and rash (chest wall, reddened, scabbed lesions) present. Neurological:      General: No focal deficit present. Mental Status: He is alert and oriented to person, place, and time.    Psychiatric:         Mood and Affect: Mood normal.         Behavior: Behavior normal.         Labs:  Lab Results   Component Value Date    WBC 3.76 (L) 10/09/2023    HGB 15.1 10/09/2023    HCT 45.4 10/09/2023    MCV 94 10/09/2023     10/09/2023     Lab Results   Component Value Date    SODIUM 140 10/09/2023    K 3.6 10/09/2023     10/09/2023    CO2 27 10/09/2023    AGAP 8 10/09/2023    BUN 20 10/09/2023    CREATININE 0.92 10/09/2023    GLUC 229 (H) 10/09/2023    GLUF 188 (H) 01/30/2023    CALCIUM 8.7 10/09/2023    AST 23 10/09/2023    ALT 23 10/09/2023    ALKPHOS 81 10/09/2023    TP 6.9 10/09/2023    TBILI 0.80 10/09/2023    EGFR 78 10/09/2023

## 2023-10-09 ENCOUNTER — HOSPITAL ENCOUNTER (OUTPATIENT)
Dept: INFUSION CENTER | Facility: HOSPITAL | Age: 78
Discharge: HOME | End: 2023-10-09
Payer: MEDICARE

## 2023-10-09 DIAGNOSIS — C34.90 NSCLC METASTATIC TO BONE (HCC): Primary | ICD-10-CM

## 2023-10-09 DIAGNOSIS — C79.51 NSCLC METASTATIC TO BONE (HCC): Primary | ICD-10-CM

## 2023-10-09 LAB
ALBUMIN SERPL BCP-MCNC: 3.7 G/DL (ref 3.5–5)
ALP SERPL-CCNC: 81 U/L (ref 34–104)
ALT SERPL W P-5'-P-CCNC: 23 U/L (ref 7–52)
ANION GAP SERPL CALCULATED.3IONS-SCNC: 8 MMOL/L
AST SERPL W P-5'-P-CCNC: 23 U/L (ref 13–39)
BASOPHILS # BLD AUTO: 0.03 THOUSANDS/ÂΜL (ref 0–0.1)
BASOPHILS NFR BLD AUTO: 1 % (ref 0–1)
BILIRUB SERPL-MCNC: 0.8 MG/DL (ref 0.2–1)
BUN SERPL-MCNC: 20 MG/DL (ref 5–25)
CALCIUM SERPL-MCNC: 8.7 MG/DL (ref 8.4–10.2)
CHLORIDE SERPL-SCNC: 105 MMOL/L (ref 96–108)
CO2 SERPL-SCNC: 27 MMOL/L (ref 21–32)
CREAT SERPL-MCNC: 0.92 MG/DL (ref 0.6–1.3)
EOSINOPHIL # BLD AUTO: 0.18 THOUSAND/ÂΜL (ref 0–0.61)
EOSINOPHIL NFR BLD AUTO: 5 % (ref 0–6)
ERYTHROCYTE [DISTWIDTH] IN BLOOD BY AUTOMATED COUNT: 14.2 % (ref 11.6–15.1)
GFR SERPL CREATININE-BSD FRML MDRD: 78 ML/MIN/1.73SQ M
GLUCOSE SERPL-MCNC: 229 MG/DL (ref 65–140)
HCT VFR BLD AUTO: 45.4 % (ref 36.5–49.3)
HGB BLD-MCNC: 15.1 G/DL (ref 12–17)
IMM GRANULOCYTES # BLD AUTO: 0.01 THOUSAND/UL (ref 0–0.2)
IMM GRANULOCYTES NFR BLD AUTO: 0 % (ref 0–2)
LYMPHOCYTES # BLD AUTO: 0.45 THOUSANDS/ÂΜL (ref 0.6–4.47)
LYMPHOCYTES NFR BLD AUTO: 12 % (ref 14–44)
MCH RBC QN AUTO: 31.4 PG (ref 26.8–34.3)
MCHC RBC AUTO-ENTMCNC: 33.3 G/DL (ref 31.4–37.4)
MCV RBC AUTO: 94 FL (ref 82–98)
MONOCYTES # BLD AUTO: 0.33 THOUSAND/ÂΜL (ref 0.17–1.22)
MONOCYTES NFR BLD AUTO: 9 % (ref 4–12)
NEUTROPHILS # BLD AUTO: 2.76 THOUSANDS/ÂΜL (ref 1.85–7.62)
NEUTS SEG NFR BLD AUTO: 73 % (ref 43–75)
NRBC BLD AUTO-RTO: 0 /100 WBCS
PLATELET # BLD AUTO: 158 THOUSANDS/UL (ref 149–390)
PMV BLD AUTO: 11.4 FL (ref 8.9–12.7)
POTASSIUM SERPL-SCNC: 3.6 MMOL/L (ref 3.5–5.3)
PROT SERPL-MCNC: 6.9 G/DL (ref 6.4–8.4)
RBC # BLD AUTO: 4.81 MILLION/UL (ref 3.88–5.62)
SODIUM SERPL-SCNC: 140 MMOL/L (ref 135–147)
T3FREE SERPL-MCNC: 3.47 PG/ML (ref 2.5–3.9)
TSH SERPL DL<=0.05 MIU/L-ACNC: 1.21 UIU/ML (ref 0.45–4.5)
WBC # BLD AUTO: 3.76 THOUSAND/UL (ref 4.31–10.16)

## 2023-10-09 PROCEDURE — 84481 FREE ASSAY (FT-3): CPT | Performed by: INTERNAL MEDICINE

## 2023-10-09 PROCEDURE — 85025 COMPLETE CBC W/AUTO DIFF WBC: CPT | Performed by: INTERNAL MEDICINE

## 2023-10-09 PROCEDURE — 84443 ASSAY THYROID STIM HORMONE: CPT | Performed by: INTERNAL MEDICINE

## 2023-10-09 PROCEDURE — 80053 COMPREHEN METABOLIC PANEL: CPT | Performed by: INTERNAL MEDICINE

## 2023-10-09 NOTE — PROGRESS NOTES
Pt arrived on unit for lab work. Pt c/o a rash on Left side of chest. Some open areas and reddened pustules noted. Secure photo documented and sent to Connie Krause RN notified with Dr. Savlador Hamiltno. Pt has a follow up on 10/11 prior to tx. Will be assessed than. Pt verbalized understanding. Left ambulatory with cane for d/c.

## 2023-10-11 ENCOUNTER — TELEPHONE (OUTPATIENT)
Age: 78
End: 2023-10-11

## 2023-10-11 ENCOUNTER — OFFICE VISIT (OUTPATIENT)
Age: 78
End: 2023-10-11
Payer: MEDICARE

## 2023-10-11 ENCOUNTER — HOSPITAL ENCOUNTER (OUTPATIENT)
Dept: INFUSION CENTER | Facility: HOSPITAL | Age: 78
Discharge: HOME | End: 2023-10-11
Attending: INTERNAL MEDICINE
Payer: MEDICARE

## 2023-10-11 VITALS
RESPIRATION RATE: 18 BRPM | TEMPERATURE: 97 F | HEART RATE: 67 BPM | DIASTOLIC BLOOD PRESSURE: 78 MMHG | BODY MASS INDEX: 34.03 KG/M2 | HEIGHT: 72 IN | OXYGEN SATURATION: 96 % | SYSTOLIC BLOOD PRESSURE: 156 MMHG

## 2023-10-11 VITALS
DIASTOLIC BLOOD PRESSURE: 70 MMHG | SYSTOLIC BLOOD PRESSURE: 122 MMHG | BODY MASS INDEX: 34 KG/M2 | OXYGEN SATURATION: 96 % | WEIGHT: 251 LBS | TEMPERATURE: 97.7 F | HEART RATE: 70 BPM | RESPIRATION RATE: 16 BRPM | HEIGHT: 72 IN

## 2023-10-11 DIAGNOSIS — R21 RASH: ICD-10-CM

## 2023-10-11 DIAGNOSIS — C34.90 NSCLC METASTATIC TO BONE (HCC): Primary | ICD-10-CM

## 2023-10-11 DIAGNOSIS — C79.51 NSCLC METASTATIC TO BONE (HCC): Primary | ICD-10-CM

## 2023-10-11 PROCEDURE — 96413 CHEMO IV INFUSION 1 HR: CPT

## 2023-10-11 PROCEDURE — 99214 OFFICE O/P EST MOD 30 MIN: CPT | Performed by: NURSE PRACTITIONER

## 2023-10-11 RX ORDER — CEPHALEXIN 500 MG/1
500 CAPSULE ORAL EVERY 6 HOURS SCHEDULED
Qty: 28 CAPSULE | Refills: 0 | Status: SHIPPED | OUTPATIENT
Start: 2023-10-11 | End: 2023-10-18

## 2023-10-11 RX ORDER — SODIUM CHLORIDE 9 MG/ML
20 INJECTION, SOLUTION INTRAVENOUS ONCE
Status: COMPLETED | OUTPATIENT
Start: 2023-10-11 | End: 2023-10-11

## 2023-10-11 RX ORDER — SODIUM CHLORIDE 9 MG/ML
20 INJECTION, SOLUTION INTRAVENOUS ONCE
OUTPATIENT
Start: 2023-11-01

## 2023-10-11 RX ADMIN — SODIUM CHLORIDE 200 MG: 9 INJECTION, SOLUTION INTRAVENOUS at 09:09

## 2023-10-11 RX ADMIN — SODIUM CHLORIDE 20 ML/HR: 0.9 INJECTION, SOLUTION INTRAVENOUS at 09:09

## 2023-10-11 NOTE — TELEPHONE ENCOUNTER
Spoke with patient to confirm his prescription for Keflex 500mg capsules. I informed patient to take the capsules with food and a full glass of water.  Pt verbalized understanding and is in agreement with plan

## 2023-10-23 ENCOUNTER — TELEPHONE (OUTPATIENT)
Dept: HEMATOLOGY ONCOLOGY | Facility: CLINIC | Age: 78
End: 2023-10-23

## 2023-10-23 NOTE — TELEPHONE ENCOUNTER
Spoke with patient who stated he was calling to inform AR Nieto that his rash has cleared up nicely since taking the antibiotics and he only has 2 small areas left that he has been putting benadryl cream on. Pt wanted to make sure she knew this. I informed pt I will let her know and in the meantime if anything gets worse or he has another rash breakout to give us a call back. Pt verbalized understanding and is in agreement with plan.

## 2023-10-29 DIAGNOSIS — I10 ESSENTIAL HYPERTENSION: ICD-10-CM

## 2023-10-29 RX ORDER — HYDROCHLOROTHIAZIDE 25 MG/1
25 TABLET ORAL DAILY
Qty: 90 TABLET | Refills: 0 | Status: SHIPPED | OUTPATIENT
Start: 2023-10-29

## 2023-10-29 RX ORDER — LISINOPRIL 20 MG/1
20 TABLET ORAL DAILY
Qty: 90 TABLET | Refills: 0 | Status: SHIPPED | OUTPATIENT
Start: 2023-10-29

## 2023-10-30 ENCOUNTER — HOSPITAL ENCOUNTER (OUTPATIENT)
Dept: INFUSION CENTER | Facility: HOSPITAL | Age: 78
Discharge: HOME | End: 2023-10-30
Payer: MEDICARE

## 2023-10-30 DIAGNOSIS — C34.90 NSCLC METASTATIC TO BONE (HCC): Primary | ICD-10-CM

## 2023-10-30 DIAGNOSIS — C79.51 NSCLC METASTATIC TO BONE (HCC): Primary | ICD-10-CM

## 2023-10-30 LAB
ALBUMIN SERPL BCP-MCNC: 3.7 G/DL (ref 3.5–5)
ALP SERPL-CCNC: 80 U/L (ref 34–104)
ALT SERPL W P-5'-P-CCNC: 21 U/L (ref 7–52)
ANION GAP SERPL CALCULATED.3IONS-SCNC: 8 MMOL/L
AST SERPL W P-5'-P-CCNC: 23 U/L (ref 13–39)
BASOPHILS # BLD AUTO: 0.03 THOUSANDS/ÂΜL (ref 0–0.1)
BASOPHILS NFR BLD AUTO: 1 % (ref 0–1)
BILIRUB SERPL-MCNC: 0.92 MG/DL (ref 0.2–1)
BUN SERPL-MCNC: 18 MG/DL (ref 5–25)
CALCIUM SERPL-MCNC: 9.1 MG/DL (ref 8.4–10.2)
CHLORIDE SERPL-SCNC: 104 MMOL/L (ref 96–108)
CO2 SERPL-SCNC: 28 MMOL/L (ref 21–32)
CREAT SERPL-MCNC: 0.89 MG/DL (ref 0.6–1.3)
EOSINOPHIL # BLD AUTO: 0.19 THOUSAND/ÂΜL (ref 0–0.61)
EOSINOPHIL NFR BLD AUTO: 5 % (ref 0–6)
ERYTHROCYTE [DISTWIDTH] IN BLOOD BY AUTOMATED COUNT: 14.1 % (ref 11.6–15.1)
GFR SERPL CREATININE-BSD FRML MDRD: 81 ML/MIN/1.73SQ M
GLUCOSE SERPL-MCNC: 212 MG/DL (ref 65–140)
HCT VFR BLD AUTO: 46.6 % (ref 36.5–49.3)
HGB BLD-MCNC: 15.3 G/DL (ref 12–17)
IMM GRANULOCYTES # BLD AUTO: 0.02 THOUSAND/UL (ref 0–0.2)
IMM GRANULOCYTES NFR BLD AUTO: 1 % (ref 0–2)
LYMPHOCYTES # BLD AUTO: 0.47 THOUSANDS/ÂΜL (ref 0.6–4.47)
LYMPHOCYTES NFR BLD AUTO: 11 % (ref 14–44)
MCH RBC QN AUTO: 30.7 PG (ref 26.8–34.3)
MCHC RBC AUTO-ENTMCNC: 32.8 G/DL (ref 31.4–37.4)
MCV RBC AUTO: 93 FL (ref 82–98)
MONOCYTES # BLD AUTO: 0.36 THOUSAND/ÂΜL (ref 0.17–1.22)
MONOCYTES NFR BLD AUTO: 9 % (ref 4–12)
NEUTROPHILS # BLD AUTO: 3.08 THOUSANDS/ÂΜL (ref 1.85–7.62)
NEUTS SEG NFR BLD AUTO: 73 % (ref 43–75)
NRBC BLD AUTO-RTO: 0 /100 WBCS
PLATELET # BLD AUTO: 156 THOUSANDS/UL (ref 149–390)
PMV BLD AUTO: 10.8 FL (ref 8.9–12.7)
POTASSIUM SERPL-SCNC: 3.7 MMOL/L (ref 3.5–5.3)
PROT SERPL-MCNC: 7.1 G/DL (ref 6.4–8.4)
RBC # BLD AUTO: 4.99 MILLION/UL (ref 3.88–5.62)
SODIUM SERPL-SCNC: 140 MMOL/L (ref 135–147)
T3FREE SERPL-MCNC: 3.16 PG/ML (ref 2.5–3.9)
TSH SERPL DL<=0.05 MIU/L-ACNC: 1.62 UIU/ML (ref 0.45–4.5)
WBC # BLD AUTO: 4.15 THOUSAND/UL (ref 4.31–10.16)

## 2023-10-30 PROCEDURE — 84481 FREE ASSAY (FT-3): CPT | Performed by: INTERNAL MEDICINE

## 2023-10-30 PROCEDURE — 85025 COMPLETE CBC W/AUTO DIFF WBC: CPT | Performed by: INTERNAL MEDICINE

## 2023-10-30 PROCEDURE — 80053 COMPREHEN METABOLIC PANEL: CPT | Performed by: INTERNAL MEDICINE

## 2023-10-30 PROCEDURE — 84443 ASSAY THYROID STIM HORMONE: CPT | Performed by: INTERNAL MEDICINE

## 2023-10-30 NOTE — PROGRESS NOTES
Pt here for port labs tolerated well no adverse reactions declined AVS and left ambulatory with steady gait utilizing a cane.

## 2023-11-01 ENCOUNTER — HOSPITAL ENCOUNTER (OUTPATIENT)
Dept: INFUSION CENTER | Facility: HOSPITAL | Age: 78
Discharge: HOME | End: 2023-11-01
Attending: INTERNAL MEDICINE
Payer: MEDICARE

## 2023-11-01 VITALS
BODY MASS INDEX: 33.98 KG/M2 | TEMPERATURE: 96.9 F | RESPIRATION RATE: 16 BRPM | HEIGHT: 72 IN | DIASTOLIC BLOOD PRESSURE: 68 MMHG | SYSTOLIC BLOOD PRESSURE: 149 MMHG | HEART RATE: 64 BPM | OXYGEN SATURATION: 96 % | WEIGHT: 250.88 LBS

## 2023-11-01 DIAGNOSIS — C34.90 NSCLC METASTATIC TO BONE (HCC): Primary | ICD-10-CM

## 2023-11-01 DIAGNOSIS — C79.51 NSCLC METASTATIC TO BONE (HCC): Primary | ICD-10-CM

## 2023-11-01 PROCEDURE — 96413 CHEMO IV INFUSION 1 HR: CPT

## 2023-11-01 RX ORDER — SODIUM CHLORIDE 9 MG/ML
20 INJECTION, SOLUTION INTRAVENOUS ONCE
Status: COMPLETED | OUTPATIENT
Start: 2023-11-01 | End: 2023-11-01

## 2023-11-01 RX ADMIN — SODIUM CHLORIDE 20 ML/HR: 9 INJECTION, SOLUTION INTRAVENOUS at 09:12

## 2023-11-01 RX ADMIN — SODIUM CHLORIDE 200 MG: 9 INJECTION, SOLUTION INTRAVENOUS at 09:12

## 2023-11-01 NOTE — PROGRESS NOTES
Pt tolerated chemotherapy infusion without any adverse reactions. Port flushed and de-accessed, band-aid applied. Pt ambulated out of unit with a steady gait.  Declined AVS

## 2023-11-02 DIAGNOSIS — E11.65 TYPE 2 DIABETES MELLITUS WITH HYPERGLYCEMIA, WITHOUT LONG-TERM CURRENT USE OF INSULIN (HCC): ICD-10-CM

## 2023-11-02 DIAGNOSIS — E78.2 MIXED HYPERLIPIDEMIA: ICD-10-CM

## 2023-11-02 RX ORDER — LOVASTATIN 20 MG/1
TABLET ORAL
Qty: 90 TABLET | Refills: 0 | Status: SHIPPED | OUTPATIENT
Start: 2023-11-02

## 2023-11-02 RX ORDER — GLIPIZIDE 2.5 MG/1
TABLET, EXTENDED RELEASE ORAL
Qty: 90 TABLET | Refills: 0 | Status: SHIPPED | OUTPATIENT
Start: 2023-11-02

## 2023-11-20 ENCOUNTER — HOSPITAL ENCOUNTER (OUTPATIENT)
Dept: INFUSION CENTER | Facility: HOSPITAL | Age: 78
Discharge: HOME | End: 2023-11-20
Payer: MEDICARE

## 2023-11-20 DIAGNOSIS — C79.51 NSCLC METASTATIC TO BONE (HCC): Primary | ICD-10-CM

## 2023-11-20 DIAGNOSIS — C34.90 NSCLC METASTATIC TO BONE (HCC): Primary | ICD-10-CM

## 2023-11-20 LAB
ALBUMIN SERPL BCP-MCNC: 3.7 G/DL (ref 3.5–5)
ALP SERPL-CCNC: 72 U/L (ref 34–104)
ALT SERPL W P-5'-P-CCNC: 22 U/L (ref 7–52)
ANION GAP SERPL CALCULATED.3IONS-SCNC: 6 MMOL/L
AST SERPL W P-5'-P-CCNC: 22 U/L (ref 13–39)
BASOPHILS # BLD AUTO: 0.04 THOUSANDS/ÂΜL (ref 0–0.1)
BASOPHILS NFR BLD AUTO: 1 % (ref 0–1)
BILIRUB SERPL-MCNC: 0.59 MG/DL (ref 0.2–1)
BUN SERPL-MCNC: 20 MG/DL (ref 5–25)
CALCIUM SERPL-MCNC: 9.4 MG/DL (ref 8.4–10.2)
CHLORIDE SERPL-SCNC: 106 MMOL/L (ref 96–108)
CO2 SERPL-SCNC: 28 MMOL/L (ref 21–32)
CREAT SERPL-MCNC: 0.92 MG/DL (ref 0.6–1.3)
EOSINOPHIL # BLD AUTO: 0.19 THOUSAND/ÂΜL (ref 0–0.61)
EOSINOPHIL NFR BLD AUTO: 4 % (ref 0–6)
ERYTHROCYTE [DISTWIDTH] IN BLOOD BY AUTOMATED COUNT: 14.3 % (ref 11.6–15.1)
GFR SERPL CREATININE-BSD FRML MDRD: 78 ML/MIN/1.73SQ M
GLUCOSE SERPL-MCNC: 161 MG/DL (ref 65–140)
HCT VFR BLD AUTO: 46 % (ref 36.5–49.3)
HGB BLD-MCNC: 15 G/DL (ref 12–17)
IMM GRANULOCYTES # BLD AUTO: 0.03 THOUSAND/UL (ref 0–0.2)
IMM GRANULOCYTES NFR BLD AUTO: 1 % (ref 0–2)
LYMPHOCYTES # BLD AUTO: 0.6 THOUSANDS/ÂΜL (ref 0.6–4.47)
LYMPHOCYTES NFR BLD AUTO: 11 % (ref 14–44)
MCH RBC QN AUTO: 30.6 PG (ref 26.8–34.3)
MCHC RBC AUTO-ENTMCNC: 32.6 G/DL (ref 31.4–37.4)
MCV RBC AUTO: 94 FL (ref 82–98)
MONOCYTES # BLD AUTO: 0.7 THOUSAND/ÂΜL (ref 0.17–1.22)
MONOCYTES NFR BLD AUTO: 13 % (ref 4–12)
NEUTROPHILS # BLD AUTO: 3.91 THOUSANDS/ÂΜL (ref 1.85–7.62)
NEUTS SEG NFR BLD AUTO: 70 % (ref 43–75)
NRBC BLD AUTO-RTO: 0 /100 WBCS
PLATELET # BLD AUTO: 173 THOUSANDS/UL (ref 149–390)
PMV BLD AUTO: 11.2 FL (ref 8.9–12.7)
POTASSIUM SERPL-SCNC: 3.6 MMOL/L (ref 3.5–5.3)
PROT SERPL-MCNC: 7 G/DL (ref 6.4–8.4)
RBC # BLD AUTO: 4.9 MILLION/UL (ref 3.88–5.62)
SODIUM SERPL-SCNC: 140 MMOL/L (ref 135–147)
T3FREE SERPL-MCNC: 3.11 PG/ML (ref 2.5–3.9)
TSH SERPL DL<=0.05 MIU/L-ACNC: 1.53 UIU/ML (ref 0.45–4.5)
WBC # BLD AUTO: 5.47 THOUSAND/UL (ref 4.31–10.16)

## 2023-11-20 PROCEDURE — 80053 COMPREHEN METABOLIC PANEL: CPT | Performed by: INTERNAL MEDICINE

## 2023-11-20 PROCEDURE — 84481 FREE ASSAY (FT-3): CPT | Performed by: INTERNAL MEDICINE

## 2023-11-20 PROCEDURE — 84443 ASSAY THYROID STIM HORMONE: CPT | Performed by: INTERNAL MEDICINE

## 2023-11-20 PROCEDURE — 85025 COMPLETE CBC W/AUTO DIFF WBC: CPT | Performed by: INTERNAL MEDICINE

## 2023-11-21 RX ORDER — SODIUM CHLORIDE 9 MG/ML
20 INJECTION, SOLUTION INTRAVENOUS ONCE
Status: CANCELLED | OUTPATIENT
Start: 2023-11-22

## 2023-11-22 ENCOUNTER — HOSPITAL ENCOUNTER (OUTPATIENT)
Dept: INFUSION CENTER | Facility: HOSPITAL | Age: 78
Discharge: HOME | End: 2023-11-22
Attending: INTERNAL MEDICINE
Payer: MEDICARE

## 2023-11-22 VITALS
TEMPERATURE: 98 F | HEART RATE: 88 BPM | RESPIRATION RATE: 16 BRPM | OXYGEN SATURATION: 95 % | SYSTOLIC BLOOD PRESSURE: 121 MMHG | DIASTOLIC BLOOD PRESSURE: 69 MMHG

## 2023-11-22 DIAGNOSIS — C34.90 NSCLC METASTATIC TO BONE (HCC): Primary | ICD-10-CM

## 2023-11-22 DIAGNOSIS — C79.51 NSCLC METASTATIC TO BONE (HCC): Primary | ICD-10-CM

## 2023-11-22 RX ORDER — SODIUM CHLORIDE 9 MG/ML
20 INJECTION, SOLUTION INTRAVENOUS ONCE
Status: COMPLETED | OUTPATIENT
Start: 2023-11-22 | End: 2023-11-22

## 2023-11-22 RX ADMIN — SODIUM CHLORIDE 200 MG: 9 INJECTION, SOLUTION INTRAVENOUS at 09:20

## 2023-11-22 RX ADMIN — SODIUM CHLORIDE 20 ML/HR: 9 INJECTION, SOLUTION INTRAVENOUS at 09:20

## 2023-12-10 NOTE — PROGRESS NOTES
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 ([address])   3/1/2018 -  Cancer Staged    Staging form: Lung, AJCC 8th Edition  - Clinical stage from 3/1/2018: Stage IVB (cTX, cN2, cM1c) - Signed by Stephanie Jin DO on 2022 Initial Diagnosis    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 ([address])     2022 - 2022 Chemotherapy    pembrolizumab (KEYTRUDA) IVPB, 200 mg, Intravenous, Once, 10 of 13 cycles  Administration: 200 mg (2022), 200 mg (6/15/2022), 200 mg (2022), 200 mg (2022), 200 mg (2022), 200 mg (2022), 200 mg (2022), 200 mg (10/19/2022), 200 mg (2022), 200 mg (2022)     2022 - 2022 Chemotherapy    pembrolizumab (KEYTRUDA) IVPB, 400 mg, Intravenous, Once, 1 of 6 cycles     2022 -  Chemotherapy    alteplase (CATHFLO), 2 mg, Intracatheter, Every 2 hour PRN, 17 of 23 cycles  pembrolizumab (KEYTRUDA) IVPB, 200 mg, Intravenous, Once, 17 of 23 cycles  Administration: 200 mg (2022), 200 mg (2023), 200 mg (2023), 200 mg (2023), 200 mg (3/15/2023), 200 mg (2023), 200 mg (2023), 200 mg (2023), 200 mg (2023), 200 mg (2023), 200 mg (2023), 200 mg (2023), 200 mg (2023), 200 mg (2023), 200 mg (10/11/2023), 200 mg (2023), 200 mg (2023)         Interval History: Patient returns for follow-up visit. He is continue to receive Keytruda every 3 weeks. Blood work has remained in normal range.   He still struggles with rash, this is stable. Denies any nausea/vomiting, diarrhea. No chest pain, cough or shortness of breath. Overall feels well. He is overdue for imaging we will schedule this today. We also discussed extending out his maintenance regimen schedule versus treatment holiday based on results of imaging. REVIEW OF SYSTEMS:  Please note that a 14-point review of systems was performed to include Constitutional, HEENT, Respiratory, CVS, GI, , Musculoskeletal, Integumentary, Neurologic, Rheumatologic, Endocrinologic, Psychiatric, Lymphatic, and Hematologic/Oncologic systems were reviewed and are negative unless otherwise stated in HPI. Positive and negative findings pertinent to this evaluation are incorporated into the history of present illness. 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 He has a persistent rash that is stable. It does not appear infected today. Clinically, he is well-appearing and his blood work remains in normal range. He is due for surveillance imaging. Orders placed for PET/CT today. He will follow-up with Dr. Negrita Chappell to review results and discuss treatment holiday versus extension of his current schedule to monthly versus every 6-week since he has been on his current regimen for several years. Patient is in agreement with this.   He does not wish to stop treatment but if his scan is stable would be interested in discussing extending treatment intervals. He will proceed with treatment today without change. He knows to call with any questions or concerns prior to his next visit. I spent 30 minutes on chart review, face to face counseling time, coordination of care and documentation. Past Medical History:   has a past medical history of Diabetes mellitus ([address]), High blood pressure, and High cholesterol. PAST SURGICAL HISTORY:   has a past surgical history that includes Elbow surgery (Left, 2004).     CURRENT MEDICATIONS  Current Outpatient Medications   Medication Sig Dispense Refill    apixaban (Eliquis) 5 mg Take 1 tablet (5 mg total) by mouth 2 (two) times a day (Patient taking differently: Take 5 mg by mouth in the morning) 60 tablet 11    Calcium Carb-Cholecalciferol (CALCIUM 1000 + D PO) Take by mouth      Cholecalciferol (Vitamin D3) 1.25 MG (35120 UT) CAPS Take by mouth      dorzolamide (TRUSOPT) 2 % ophthalmic solution       dorzolamide-timolol (COSOPT) 22.3-6.8 MG/ML ophthalmic solution INSTILL 1 DROP INTO EACH EYE TWICE DAILY      glipiZIDE (GLUCOTROL XL) 2.5 mg 24 hr tablet Take 1 tablet by mouth once daily 90 tablet 0    hydrochlorothiazide (HYDRODIURIL) 25 mg tablet Take 1 tablet by mouth once daily 90 tablet 0    lisinopril (ZESTRIL) 20 mg tablet Take 1 tablet by mouth once daily 90 tablet 0    lovastatin (MEVACOR) 20 mg tablet Take 1 tablet by mouth once daily 90 tablet 0    methocarbamol (ROBAXIN) 500 mg tablet Take 1 tablet (500 mg total) by mouth 3 (three) times a day 20 tablet 0    mupirocin (BACTROBAN) 2 % ointment Apply topically 3 (three) times a day 22 g 1    potassium chloride (K-DUR,KLOR-CON) 20 mEq tablet Take 1 tablet by mouth once daily 90 tablet 0    Contour Next Test test strip USE 1 STRIP TO CHECK GLUCOSE ONCE DAILY 100 each 0    Lancet Devices (Microlet Next Lancing Device) MISC USE ONCE DAILY IN THE MORNING TO TEST BLOOD SUGAR 1 each 0    methylPREDNISolone 4 MG tablet therapy pack Use as directed on package (Patient not taking: Reported on 2/22/2023) 1 each 0    Microlet Lancets MISC USE 1  TO CHECK GLUCOSE ONCE DAILY 100 each 0    neomycin-polymyxin-hydrocortisone (CORTISPORIN) otic solution Administer 4 drops to the right ear every 8 (eight) hours (Patient not taking: Reported on 2/22/2023) 10 mL 0     No current facility-administered medications for this visit. Facility-Administered Medications Ordered in Other Visits   Medication Dose Route Frequency Provider Last Rate Last Admin    alteplase (CATHFLO) injection 2 mg  2 mg Intracatheter Q2H PRN Marques Fernandez DO         [unfilled]    SOCIAL HISTORY:   reports that he quit smoking about 17 years ago. His smoking use included cigarettes. He started smoking about 66 years ago. He has a 50.0 pack-year smoking history. He has never used smokeless tobacco. He reports current alcohol use of about 21.0 standard drinks of alcohol per week. He reports that he does not use drugs. FAMILY HISTORY:  family history includes Colon cancer in his father. ALLERGIES:  is allergic to aspirin and ibuprofen. Physical Exam:  Vital Signs:   Visit Vitals  /76 (BP Location: Left arm, Patient Position: Sitting, Cuff Size: Standard)   Pulse 79   Temp (!) 97.2 °F (36.2 °C) (Temporal)   Resp 16   Ht 6' 0.01" (1.829 m)   Wt 115 kg (253 lb)   SpO2 98%   BMI 34.30 kg/m²   Smoking Status Former   BSA 2.36 m²     Body mass index is 34.3 kg/m². Body surface area is 2.36 meters squared. Physical Exam  Constitutional:       General: He is not in acute distress. Appearance: He is well-developed. He is not diaphoretic. HENT:      Head: Normocephalic and atraumatic. Mouth/Throat:      Pharynx: No oropharyngeal exudate. Eyes:      General: No scleral icterus. Pupils: Pupils are equal, round, and reactive to light.    Cardiovascular:      Rate and Rhythm: Normal rate and regular rhythm. Heart sounds: No murmur heard. Pulmonary:      Effort: Pulmonary effort is normal. No respiratory distress. Breath sounds: Normal breath sounds. Abdominal:      General: Bowel sounds are normal. There is no distension. Palpations: Abdomen is soft. There is no mass. Tenderness: There is no abdominal tenderness. Musculoskeletal:         General: Normal range of motion. Cervical back: Normal range of motion and neck supple. Lymphadenopathy:      Cervical: No cervical adenopathy. Skin:     General: Skin is warm and dry. Findings: Rash (Mild rash on chest wall. Few skin lesions on bilateral arms.) present. Neurological:      General: No focal deficit present. Mental Status: He is alert and oriented to person, place, and time. Psychiatric:         Mood and Affect: Mood normal.         Behavior: Behavior normal.         Thought Content:  Thought content normal.         Judgment: Judgment normal.         Labs:  Lab Results   Component Value Date    WBC 4.46 12/11/2023    HGB 15.2 12/11/2023    HCT 45.6 12/11/2023    MCV 94 12/11/2023     12/11/2023     Lab Results   Component Value Date    SODIUM 142 12/11/2023    K 3.5 12/11/2023     12/11/2023    CO2 29 12/11/2023    AGAP 7 12/11/2023    BUN 19 12/11/2023    CREATININE 0.94 12/11/2023    GLUC 254 (H) 12/11/2023    GLUF 188 (H) 01/30/2023    CALCIUM 9.1 12/11/2023    AST 23 12/11/2023    ALT 25 12/11/2023    ALKPHOS 75 12/11/2023    TP 6.9 12/11/2023    TBILI 0.97 12/11/2023    EGFR 76 12/11/2023

## 2023-12-11 ENCOUNTER — HOSPITAL ENCOUNTER (OUTPATIENT)
Dept: INFUSION CENTER | Facility: HOSPITAL | Age: 78
Discharge: HOME | End: 2023-12-11
Payer: MEDICARE

## 2023-12-11 DIAGNOSIS — C34.90 NSCLC METASTATIC TO BONE (HCC): Primary | ICD-10-CM

## 2023-12-11 DIAGNOSIS — C79.51 NSCLC METASTATIC TO BONE (HCC): Primary | ICD-10-CM

## 2023-12-11 LAB
ALBUMIN SERPL BCP-MCNC: 3.5 G/DL (ref 3.5–5)
ALP SERPL-CCNC: 75 U/L (ref 34–104)
ALT SERPL W P-5'-P-CCNC: 25 U/L (ref 7–52)
ANION GAP SERPL CALCULATED.3IONS-SCNC: 7 MMOL/L
AST SERPL W P-5'-P-CCNC: 23 U/L (ref 13–39)
BASOPHILS # BLD AUTO: 0.05 THOUSANDS/ÂΜL (ref 0–0.1)
BASOPHILS NFR BLD AUTO: 1 % (ref 0–1)
BILIRUB SERPL-MCNC: 0.97 MG/DL (ref 0.2–1)
BUN SERPL-MCNC: 19 MG/DL (ref 5–25)
CALCIUM SERPL-MCNC: 9.1 MG/DL (ref 8.4–10.2)
CHLORIDE SERPL-SCNC: 106 MMOL/L (ref 96–108)
CO2 SERPL-SCNC: 29 MMOL/L (ref 21–32)
CREAT SERPL-MCNC: 0.94 MG/DL (ref 0.6–1.3)
EOSINOPHIL # BLD AUTO: 0.25 THOUSAND/ÂΜL (ref 0–0.61)
EOSINOPHIL NFR BLD AUTO: 6 % (ref 0–6)
ERYTHROCYTE [DISTWIDTH] IN BLOOD BY AUTOMATED COUNT: 14.5 % (ref 11.6–15.1)
GFR SERPL CREATININE-BSD FRML MDRD: 76 ML/MIN/1.73SQ M
GLUCOSE SERPL-MCNC: 254 MG/DL (ref 65–140)
HCT VFR BLD AUTO: 45.6 % (ref 36.5–49.3)
HGB BLD-MCNC: 15.2 G/DL (ref 12–17)
IMM GRANULOCYTES # BLD AUTO: 0.02 THOUSAND/UL (ref 0–0.2)
IMM GRANULOCYTES NFR BLD AUTO: 0 % (ref 0–2)
LYMPHOCYTES # BLD AUTO: 0.51 THOUSANDS/ÂΜL (ref 0.6–4.47)
LYMPHOCYTES NFR BLD AUTO: 11 % (ref 14–44)
MCH RBC QN AUTO: 31.5 PG (ref 26.8–34.3)
MCHC RBC AUTO-ENTMCNC: 33.3 G/DL (ref 31.4–37.4)
MCV RBC AUTO: 94 FL (ref 82–98)
MONOCYTES # BLD AUTO: 0.3 THOUSAND/ÂΜL (ref 0.17–1.22)
MONOCYTES NFR BLD AUTO: 7 % (ref 4–12)
NEUTROPHILS # BLD AUTO: 3.33 THOUSANDS/ÂΜL (ref 1.85–7.62)
NEUTS SEG NFR BLD AUTO: 75 % (ref 43–75)
NRBC BLD AUTO-RTO: 0 /100 WBCS
PLATELET # BLD AUTO: 167 THOUSANDS/UL (ref 149–390)
PMV BLD AUTO: 11.4 FL (ref 8.9–12.7)
POTASSIUM SERPL-SCNC: 3.5 MMOL/L (ref 3.5–5.3)
PROT SERPL-MCNC: 6.9 G/DL (ref 6.4–8.4)
RBC # BLD AUTO: 4.83 MILLION/UL (ref 3.88–5.62)
SODIUM SERPL-SCNC: 142 MMOL/L (ref 135–147)
T3FREE SERPL-MCNC: 3.26 PG/ML (ref 2.5–3.9)
TSH SERPL DL<=0.05 MIU/L-ACNC: 1.54 UIU/ML (ref 0.45–4.5)
WBC # BLD AUTO: 4.46 THOUSAND/UL (ref 4.31–10.16)

## 2023-12-11 PROCEDURE — 85025 COMPLETE CBC W/AUTO DIFF WBC: CPT | Performed by: INTERNAL MEDICINE

## 2023-12-11 PROCEDURE — 84443 ASSAY THYROID STIM HORMONE: CPT | Performed by: INTERNAL MEDICINE

## 2023-12-11 PROCEDURE — 84481 FREE ASSAY (FT-3): CPT | Performed by: INTERNAL MEDICINE

## 2023-12-11 PROCEDURE — 80053 COMPREHEN METABOLIC PANEL: CPT | Performed by: INTERNAL MEDICINE

## 2023-12-11 RX ORDER — SODIUM CHLORIDE 9 MG/ML
20 INJECTION, SOLUTION INTRAVENOUS ONCE
Status: CANCELLED | OUTPATIENT
Start: 2023-12-13

## 2023-12-11 NOTE — PROGRESS NOTES
Pt here for port labs. Accessed without difficulty. Labs drawn and sent. Pt tolerated procedure well. Pt left unit ambulatory with steady gait.   Refused AVS.

## 2023-12-13 ENCOUNTER — OFFICE VISIT (OUTPATIENT)
Age: 78
End: 2023-12-13
Payer: MEDICARE

## 2023-12-13 ENCOUNTER — HOSPITAL ENCOUNTER (OUTPATIENT)
Dept: INFUSION CENTER | Facility: HOSPITAL | Age: 78
Discharge: HOME | End: 2023-12-13
Attending: INTERNAL MEDICINE
Payer: MEDICARE

## 2023-12-13 VITALS
HEART RATE: 72 BPM | OXYGEN SATURATION: 96 % | TEMPERATURE: 96.7 F | DIASTOLIC BLOOD PRESSURE: 72 MMHG | SYSTOLIC BLOOD PRESSURE: 145 MMHG | RESPIRATION RATE: 18 BRPM

## 2023-12-13 VITALS
HEIGHT: 72 IN | SYSTOLIC BLOOD PRESSURE: 136 MMHG | OXYGEN SATURATION: 98 % | DIASTOLIC BLOOD PRESSURE: 76 MMHG | BODY MASS INDEX: 34.27 KG/M2 | TEMPERATURE: 97.2 F | HEART RATE: 79 BPM | WEIGHT: 253 LBS | RESPIRATION RATE: 16 BRPM

## 2023-12-13 DIAGNOSIS — C34.90 NSCLC METASTATIC TO BONE (HCC): Primary | ICD-10-CM

## 2023-12-13 DIAGNOSIS — C79.51 NSCLC METASTATIC TO BONE (HCC): Primary | ICD-10-CM

## 2023-12-13 PROCEDURE — 99214 OFFICE O/P EST MOD 30 MIN: CPT | Performed by: NURSE PRACTITIONER

## 2023-12-13 RX ORDER — SODIUM CHLORIDE 9 MG/ML
20 INJECTION, SOLUTION INTRAVENOUS ONCE
Status: COMPLETED | OUTPATIENT
Start: 2023-12-13 | End: 2023-12-13

## 2023-12-13 RX ADMIN — SODIUM CHLORIDE 20 ML/HR: 9 INJECTION, SOLUTION INTRAVENOUS at 09:35

## 2023-12-13 RX ADMIN — SODIUM CHLORIDE 200 MG: 9 INJECTION, SOLUTION INTRAVENOUS at 09:30

## 2023-12-13 NOTE — PLAN OF CARE
Problem: Potential for Falls  Goal: Patient will remain free of falls  Description: INTERVENTIONS:  - Educate patient/family on patient safety including physical limitations  - Instruct patient to call for assistance with activity   - Keep Call bell within reach  - Keep bed low and locked with side rails adjusted as appropriate  - Keep care items and personal belongings within reach  - Initiate and maintain comfort rounds  - Consider moving patient to room near nurses station  Outcome: Progressing     Problem: Knowledge Deficit  Goal: Patient/family/caregiver demonstrates understanding of disease process, treatment plan, medications, and discharge instructions  Description: Complete learning assessment and assess knowledge base.   Interventions:  - Provide teaching at level of understanding  - Provide teaching via preferred learning methods  Outcome: Progressing

## 2023-12-13 NOTE — PROGRESS NOTES
Pt here fop keytruda tolerated well no adverse reactions declined AVS and left ambulatory utilizing a cane with steady gait.

## 2023-12-18 ENCOUNTER — RA CDI HCC (OUTPATIENT)
Dept: OTHER | Facility: HOSPITAL | Age: 78
End: 2023-12-18

## 2023-12-21 DIAGNOSIS — E11.65 TYPE 2 DIABETES MELLITUS WITH HYPERGLYCEMIA, WITHOUT LONG-TERM CURRENT USE OF INSULIN (HCC): ICD-10-CM

## 2023-12-21 RX ORDER — LANCETS
EACH MISCELLANEOUS
Qty: 100 EACH | Refills: 0 | Status: SHIPPED | OUTPATIENT
Start: 2023-12-21

## 2023-12-21 RX ORDER — PERPHENAZINE 16 MG/1
TABLET, FILM COATED ORAL
Qty: 100 EACH | Refills: 0 | Status: SHIPPED | OUTPATIENT
Start: 2023-12-21

## 2023-12-22 ENCOUNTER — OFFICE VISIT (OUTPATIENT)
Dept: FAMILY MEDICINE CLINIC | Facility: HOSPITAL | Age: 78
End: 2023-12-22
Payer: MEDICARE

## 2023-12-22 VITALS
BODY MASS INDEX: 34.1 KG/M2 | HEIGHT: 72 IN | OXYGEN SATURATION: 96 % | DIASTOLIC BLOOD PRESSURE: 60 MMHG | WEIGHT: 251.8 LBS | SYSTOLIC BLOOD PRESSURE: 125 MMHG | TEMPERATURE: 97.8 F | HEART RATE: 85 BPM

## 2023-12-22 DIAGNOSIS — Z23 ENCOUNTER FOR IMMUNIZATION: ICD-10-CM

## 2023-12-22 DIAGNOSIS — E11.65 TYPE 2 DIABETES MELLITUS WITH HYPERGLYCEMIA, WITHOUT LONG-TERM CURRENT USE OF INSULIN (HCC): ICD-10-CM

## 2023-12-22 DIAGNOSIS — Z79.01 LONG TERM CURRENT USE OF ANTICOAGULANT: ICD-10-CM

## 2023-12-22 DIAGNOSIS — C34.90 NSCLC METASTATIC TO BONE (HCC): ICD-10-CM

## 2023-12-22 DIAGNOSIS — I10 ESSENTIAL HYPERTENSION: ICD-10-CM

## 2023-12-22 DIAGNOSIS — C79.51 NSCLC METASTATIC TO BONE (HCC): ICD-10-CM

## 2023-12-22 DIAGNOSIS — E04.1 LEFT THYROID NODULE: ICD-10-CM

## 2023-12-22 DIAGNOSIS — Z00.00 MEDICARE ANNUAL WELLNESS VISIT, SUBSEQUENT: Primary | ICD-10-CM

## 2023-12-22 LAB — SL AMB POCT HEMOGLOBIN AIC: 6.2 (ref ?–6.5)

## 2023-12-22 PROCEDURE — 99213 OFFICE O/P EST LOW 20 MIN: CPT | Performed by: FAMILY MEDICINE

## 2023-12-22 PROCEDURE — 90662 IIV NO PRSV INCREASED AG IM: CPT

## 2023-12-22 PROCEDURE — G0439 PPPS, SUBSEQ VISIT: HCPCS

## 2023-12-22 PROCEDURE — G0008 ADMIN INFLUENZA VIRUS VAC: HCPCS

## 2023-12-22 PROCEDURE — 83036 HEMOGLOBIN GLYCOSYLATED A1C: CPT | Performed by: FAMILY MEDICINE

## 2023-12-22 NOTE — PROGRESS NOTES
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)    Essential hypertension     Excellent BP control         Type 2 diabetes mellitus with hyperglycemia, without long-term current use of insulin (HCC)       Lab Results   Component Value Date    HGBA1C 6.2 12/22/2023            Relevant Orders    POCT hemoglobin A1c (Completed)    Medicare annual wellness visit, subsequent - Primary    Left thyroid nodule     Clinically euthyroid         Long term current use of anticoagulant     Other Visit Diagnoses       Encounter for immunization        Relevant Orders    influenza vaccine, high-dose, PF 0.7 mL (FLUZONE HIGH-DOSE) (Completed)            Depression Screening and Follow-up Plan: Patient was screened for depression during today's encounter. They screened negative with a PHQ-2 score of 0.      Preventive health issues were discussed with patient, and age appropriate screening tests were ordered as noted in patient's After Visit Summary.  Personalized health advice and appropriate referrals for health education or preventive services given if needed, as noted in patient's After Visit Summary.     History of Present Illness:     Patient presents for a Medicare Wellness Visit    HPI   Patient Care Team:  Davide Torres MD as PCP - General (Family Medicine)     Review of Systems:     Review of Systems   Constitutional:  Negative for unexpected weight change.   HENT: Negative.     Respiratory: Negative.     Cardiovascular: Negative.    Genitourinary: Negative.    Musculoskeletal:  Positive for arthralgias.   All other systems reviewed and are negative.       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)    Essential hypertension    Hypokalemia    Mixed hyperlipidemia    Glaucoma of both eyes    Type 2 diabetes mellitus with hyperglycemia, without long-term current use of insulin (HCC)    Colostomy in place (HCC)     Dysequilibrium    Medicare annual wellness visit, subsequent    Unilateral edema of lower extremity    Fatty liver    Left thyroid nodule    Pulmonary emphysema (HCC)    Long term current use of anticoagulant      Past Medical and Surgical History:     Past Medical History:   Diagnosis Date    DVT of deep femoral vein, left (HCC) 2023     Past Surgical History:   Procedure Laterality Date    ELBOW SURGERY Left 2004    pinched nerve      Family History:     Family History   Problem Relation Age of Onset    Colon cancer Father       Social History:     Social History     Socioeconomic History    Marital status: /Civil Union     Spouse name: None    Number of children: None    Years of education: None    Highest education level: None   Occupational History    None   Tobacco Use    Smoking status: Former     Current packs/day: 0.00     Average packs/day: 1 pack/day for 50.0 years (50.0 ttl pk-yrs)     Types: Cigarettes     Start date:      Quit date:      Years since quittin.3    Smokeless tobacco: Never   Vaping Use    Vaping status: Never Used   Substance and Sexual Activity    Alcohol use: Yes     Alcohol/week: 21.0 standard drinks of alcohol     Types: 21 Glasses of wine per week     Comment: social drinker    Drug use: Never    Sexual activity: Not Currently     Partners: Female   Other Topics Concern    None   Social History Narrative    None     Social Determinants of Health     Financial Resource Strain: Low Risk  (2023)    Overall Financial Resource Strain (CARDIA)     Difficulty of Paying Living Expenses: Not very hard   Food Insecurity: No Food Insecurity (2024)    Hunger Vital Sign     Worried About Running Out of Food in the Last Year: Never true     Ran Out of Food in the Last Year: Never true   Transportation Needs: No Transportation Needs (2024)    PRAPARE - Transportation     Lack of Transportation (Medical): No     Lack of Transportation (Non-Medical): No    Physical Activity: Not on file   Stress: Not on file   Social Connections: Not on file   Intimate Partner Violence: Not on file   Housing Stability: Low Risk  (4/26/2024)    Housing Stability Vital Sign     Unable to Pay for Housing in the Last Year: No     Number of Places Lived in the Last Year: 1     Unstable Housing in the Last Year: No      Medications and Allergies:     Current Outpatient Medications   Medication Sig Dispense Refill    apixaban (Eliquis) 5 mg Take 1 tablet (5 mg total) by mouth 2 (two) times a day 60 tablet 11    Calcium Carb-Cholecalciferol (CALCIUM 1000 + D PO) Take by mouth      Cholecalciferol (Vitamin D3) 1.25 MG (05458 UT) CAPS Take by mouth      dorzolamide (TRUSOPT) 2 % ophthalmic solution       dorzolamide-timolol (COSOPT) 22.3-6.8 MG/ML ophthalmic solution INSTILL 1 DROP INTO EACH EYE TWICE DAILY      Lancet Devices (Microlet Next Lancing Device) MISC USE ONCE DAILY IN THE MORNING TO TEST BLOOD SUGAR 1 each 0    methocarbamol (ROBAXIN) 500 mg tablet Take 1 tablet (500 mg total) by mouth 3 (three) times a day 20 tablet 0    Contour Next Test test strip USE 1 STRIP TO CHECK GLUCOSE ONCE DAILY 100 each 0    glipiZIDE (GLUCOTROL XL) 2.5 mg 24 hr tablet Take 1 tablet by mouth once daily 90 tablet 0    hydroCHLOROthiazide 25 mg tablet Take 1 tablet by mouth once daily 90 tablet 1    lisinopril (ZESTRIL) 20 mg tablet Take 1 tablet by mouth once daily 90 tablet 1    lovastatin (MEVACOR) 20 mg tablet Take 1 tablet by mouth once daily 90 tablet 0    Microlet Lancets MISC USE 1 NEW LANCET TO CHECK GLUCOSE ONCE DAILY 100 each 0    mupirocin (BACTROBAN) 2 % ointment Apply topically 3 (three) times a day as needed (skin infection)      potassium chloride (Klor-Con M20) 20 mEq tablet Take 1 tablet by mouth once daily 90 tablet 0     No current facility-administered medications for this visit.     Allergies   Allergen Reactions    Aspirin Hives    Ibuprofen Hives      Immunizations:      Immunization History   Administered Date(s) Administered    COVID-19 MODERNA VACC 0.5 ML IM 01/30/2021, 02/27/2021, 01/04/2022    Influenza, high dose seasonal 0.7 mL 10/17/2022, 12/22/2023    Zoster 05/08/2023, 07/14/2023      Health Maintenance:     There are no preventive care reminders to display for this patient.        Topic Date Due    Pneumococcal Vaccine: 65+ Years (1 of 2 - PCV) Never done    COVID-19 Vaccine (4 - 2023-24 season) 09/01/2023      Medicare Screening Tests and Risk Assessments:     Octavio is here for his Subsequent Wellness visit.     Health Risk Assessment:   Patient rates overall health as good. Patient feels that their physical health rating is same. Patient is satisfied with their life. Eyesight was rated as same. Hearing was rated as same. Patient feels that their emotional and mental health rating is same. Patients states they are sometimes angry. Patient states they are never, rarely unusually tired/fatigued. Pain experienced in the last 7 days has been none. Patient states that he has experienced no weight loss or gain in last 6 months.     Depression Screening:   PHQ-2 Score: 0      Fall Risk Screening:   In the past year, patient has experienced: no history of falling in past year      Home Safety:  Patient does not have trouble with stairs inside or outside of their home. Patient has working smoke alarms and has working carbon monoxide detector. Home safety hazards include: none.     Nutrition:   Current diet is Regular.     Medications:   Patient is not currently taking any over-the-counter supplements. Patient is able to manage medications.     Activities of Daily Living (ADLs)/Instrumental Activities of Daily Living (IADLs):   Walk and transfer into and out of bed and chair?: Yes  Dress and groom yourself?: Yes    Bathe or shower yourself?: Yes    Feed yourself? Yes  Do your laundry/housekeeping?: Yes  Manage your money, pay your bills and track your expenses?: Yes  Make your  "own meals?: Yes    Do your own shopping?: Yes    Previous Hospitalizations:   Any hospitalizations or ED visits within the last 12 months?: No      Advance Care Planning:   Living will: Yes    Durable POA for healthcare: Yes    Advanced directive: Yes      PREVENTIVE SCREENINGS      Cardiovascular Screening:    General: Screening Not Indicated and History Lipid Disorder      Diabetes Screening:     General: Screening Not Indicated and History Diabetes      Prostate Cancer Screening:    General: Screening Not Indicated      Abdominal Aortic Aneurysm (AAA) Screening:    Risk factors include: tobacco use        Lung Cancer Screening:     General: Screening Not Indicated and History Lung Cancer    Screening, Brief Intervention, and Referral to Treatment (SBIRT)    Screening  Typical number of drinks in a day: 1  Typical number of drinks in a week: 7  Interpretation: Low risk drinking behavior.    Single Item Drug Screening:  How often have you used an illegal drug (including marijuana) or a prescription medication for non-medical reasons in the past year? never    Single Item Drug Screen Score: 0  Interpretation: Negative screen for possible drug use disorder    Vision Screening    Right eye Left eye Both eyes   Without correction 20/50 20/30 20/25   With correction           Physical Exam:     /60 (BP Location: Left arm, Patient Position: Sitting, Cuff Size: Large)   Pulse 85   Temp 97.8 °F (36.6 °C) (Tympanic)   Ht 6' 0.01\" (1.829 m)   Wt 114 kg (251 lb 12.8 oz)   SpO2 96%   BMI 34.14 kg/m²     Physical Exam  Cardiovascular:      Rate and Rhythm: Regular rhythm.      Heart sounds: Normal heart sounds.          Davide Torres MD  "

## 2023-12-22 NOTE — PATIENT INSTRUCTIONS
Medicare Preventive Visit Patient Instructions  Thank you for completing your Welcome to Medicare Visit or Medicare Annual Wellness Visit today. Your next wellness visit will be due in one year (12/22/2024).  The screening/preventive services that you may require over the next 5-10 years are detailed below. Some tests may not apply to you based off risk factors and/or age. Screening tests ordered at today's visit but not completed yet may show as past due. Also, please note that scanned in results may not display below.  Preventive Screenings:  Service Recommendations Previous Testing/Comments   Colorectal Cancer Screening  Colonoscopy    Fecal Occult Blood Test (FOBT)/Fecal Immunochemical Test (FIT)  Fecal DNA/Cologuard Test  Flexible Sigmoidoscopy Age: 45-75 years old   Colonoscopy: every 10 years (May be performed more frequently if at higher risk)  OR  FOBT/FIT: every 1 year  OR  Cologuard: every 3 years  OR  Sigmoidoscopy: every 5 years  Screening may be recommended earlier than age 45 if at higher risk for colorectal cancer. Also, an individualized decision between you and your healthcare provider will decide whether screening between the ages of 76-85 would be appropriate. Colonoscopy: Not on file  FOBT/FIT: Not on file  Cologuard: Not on file  Sigmoidoscopy: Not on file          Prostate Cancer Screening Individualized decision between patient and health care provider in men between ages of 55-69   Medicare will cover every 12 months beginning on the day after your 50th birthday PSA: No results in last 5 years           Hepatitis C Screening Once for adults born between 1945 and 1965  More frequently in patients at high risk for Hepatitis C Hep C Antibody: Not on file        Diabetes Screening 1-2 times per year if you're at risk for diabetes or have pre-diabetes Fasting glucose: 188 mg/dL (1/30/2023)  A1C: 6.1 % (8/28/2023)      Cholesterol Screening Once every 5 years if you don't have a lipid disorder.  May order more often based on risk factors. Lipid panel: 08/28/2023         Other Preventive Screenings Covered by Medicare:  Abdominal Aortic Aneurysm (AAA) Screening: covered once if your at risk. You're considered to be at risk if you have a family history of AAA or a male between the age of 65-75 who smoking at least 100 cigarettes in your lifetime.  Lung Cancer Screening: covers low dose CT scan once per year if you meet all of the following conditions: (1) Age 55-77; (2) No signs or symptoms of lung cancer; (3) Current smoker or have quit smoking within the last 15 years; (4) You have a tobacco smoking history of at least 20 pack years (packs per day x number of years you smoked); (5) You get a written order from a healthcare provider.  Glaucoma Screening: covered annually if you're considered high risk: (1) You have diabetes OR (2) Family history of glaucoma OR (3)  aged 50 and older OR (4)  American aged 65 and older  Osteoporosis Screening: covered every 2 years if you meet one of the following conditions: (1) Have a vertebral abnormality; (2) On glucocorticoid therapy for more than 3 months; (3) Have primary hyperparathyroidism; (4) On osteoporosis medications and need to assess response to drug therapy.  HIV Screening: covered annually if you're between the age of 15-65. Also covered annually if you are younger than 15 and older than 65 with risk factors for HIV infection. For pregnant patients, it is covered up to 3 times per pregnancy.    Immunizations:  Immunization Recommendations   Influenza Vaccine Annual influenza vaccination during flu season is recommended for all persons aged >= 6 months who do not have contraindications   Pneumococcal Vaccine   * Pneumococcal conjugate vaccine = PCV13 (Prevnar 13), PCV15 (Vaxneuvance), PCV20 (Prevnar 20)  * Pneumococcal polysaccharide vaccine = PPSV23 (Pneumovax) Adults 19-65 yo with certain risk factors or if 65+ yo  If never received  any pneumonia vaccine: recommend Prevnar 20 (PCV20)  Give PCV20 if previously received 1 dose of PCV13 or PPSV23   Hepatitis B Vaccine 3 dose series if at intermediate or high risk (ex: diabetes, end stage renal disease, liver disease)   Respiratory syncytial virus (RSV) Vaccine - COVERED BY MEDICARE PART D  * RSVPreF3 (Arexvy) CDC recommends that adults 60 years of age and older may receive a single dose of RSV vaccine using shared clinical decision-making (SCDM)   Tetanus (Td) Vaccine - COST NOT COVERED BY MEDICARE PART B Following completion of primary series, a booster dose should be given every 10 years to maintain immunity against tetanus. Td may also be given as tetanus wound prophylaxis.   Tdap Vaccine - COST NOT COVERED BY MEDICARE PART B Recommended at least once for all adults. For pregnant patients, recommended with each pregnancy.   Shingles Vaccine (Shingrix) - COST NOT COVERED BY MEDICARE PART B  2 shot series recommended in those 19 years and older who have or will have weakened immune systems or those 50 years and older     Health Maintenance Due:      Topic Date Due   • Hepatitis C Screening  Never done     Immunizations Due:      Topic Date Due   • Pneumococcal Vaccine: 65+ Years (1 - PCV) Never done   • Influenza Vaccine (1) 09/01/2023   • COVID-19 Vaccine (4 - 2023-24 season) 09/01/2023     Advance Directives   What are advance directives?  Advance directives are legal documents that state your wishes and plans for medical care. These plans are made ahead of time in case you lose your ability to make decisions for yourself. Advance directives can apply to any medical decision, such as the treatments you want, and if you want to donate organs.   What are the types of advance directives?  There are many types of advance directives, and each state has rules about how to use them. You may choose a combination of any of the following:  Living will:  This is a written record of the treatment you want.  You can also choose which treatments you do not want, which to limit, and which to stop at a certain time. This includes surgery, medicine, IV fluid, and tube feedings.   Durable power of  for healthcare (DPAHC):  This is a written record that states who you want to make healthcare choices for you when you are unable to make them for yourself. This person, called a proxy, is usually a family member or a friend. You may choose more than 1 proxy.  Do not resuscitate (DNR) order:  A DNR order is used in case your heart stops beating or you stop breathing. It is a request not to have certain forms of treatment, such as CPR. A DNR order may be included in other types of advance directives.  Medical directive:  This covers the care that you want if you are in a coma, near death, or unable to make decisions for yourself. You can list the treatments you want for each condition. Treatment may include pain medicine, surgery, blood transfusions, dialysis, IV or tube feedings, and a ventilator (breathing machine).  Values history:  This document has questions about your views, beliefs, and how you feel and think about life. This information can help others choose the care that you would choose.  Why are advance directives important?  An advance directive helps you control your care. Although spoken wishes may be used, it is better to have your wishes written down. Spoken wishes can be misunderstood, or not followed. Treatments may be given even if you do not want them. An advance directive may make it easier for your family to make difficult choices about your care.   Weight Management   Why it is important to manage your weight:  Being overweight increases your risk of health conditions such as heart disease, high blood pressure, type 2 diabetes, and certain types of cancer. It can also increase your risk for osteoarthritis, sleep apnea, and other respiratory problems. Aim for a slow, steady weight loss. Even a small  amount of weight loss can lower your risk of health problems.  How to lose weight safely:  A safe and healthy way to lose weight is to eat fewer calories and get regular exercise. You can lose up about 1 pound a week by decreasing the number of calories you eat by 500 calories each day.   Healthy meal plan for weight management:  A healthy meal plan includes a variety of foods, contains fewer calories, and helps you stay healthy. A healthy meal plan includes the following:  Eat whole-grain foods more often.  A healthy meal plan should contain fiber. Fiber is the part of grains, fruits, and vegetables that is not broken down by your body. Whole-grain foods are healthy and provide extra fiber in your diet. Some examples of whole-grain foods are whole-wheat breads and pastas, oatmeal, brown rice, and bulgur.  Eat a variety of vegetables every day.  Include dark, leafy greens such as spinach, kale, ortiz greens, and mustard greens. Eat yellow and orange vegetables such as carrots, sweet potatoes, and winter squash.   Eat a variety of fruits every day.  Choose fresh or canned fruit (canned in its own juice or light syrup) instead of juice. Fruit juice has very little or no fiber.  Eat low-fat dairy foods.  Drink fat-free (skim) milk or 1% milk. Eat fat-free yogurt and low-fat cottage cheese. Try low-fat cheeses such as mozzarella and other reduced-fat cheeses.  Choose meat and other protein foods that are low in fat.  Choose beans or other legumes such as split peas or lentils. Choose fish, skinless poultry (chicken or turkey), or lean cuts of red meat (beef or pork). Before you cook meat or poultry, cut off any visible fat.   Use less fat and oil.  Try baking foods instead of frying them. Add less fat, such as margarine, sour cream, regular salad dressing and mayonnaise to foods. Eat fewer high-fat foods. Some examples of high-fat foods include french fries, doughnuts, ice cream, and cakes.  Eat fewer sweets.  Limit  foods and drinks that are high in sugar. This includes candy, cookies, regular soda, and sweetened drinks.  Exercise:  Exercise at least 30 minutes per day on most days of the week. Some examples of exercise include walking, biking, dancing, and swimming. You can also fit in more physical activity by taking the stairs instead of the elevator or parking farther away from stores. Ask your healthcare provider about the best exercise plan for you.      © Copyright [employer] 2018 Information is for End User's use only and may not be sold, redistributed or otherwise used for commercial purposes. All illustrations and images included in CareNotes® are the copyrighted property of A.D.A.M., Inc. or [employer]

## 2023-12-26 ENCOUNTER — TELEPHONE (OUTPATIENT)
Dept: ADMINISTRATIVE | Facility: OTHER | Age: 78
End: 2023-12-26

## 2023-12-26 NOTE — TELEPHONE ENCOUNTER
----- Message from Cassie Mccartney sent at 12/22/2023 11:27 AM EST -----  Regarding: DM eye;[facility]  12/22/23 11:28 AM    Hello, our patient Octavio Atkins has had Diabetic Eye Exam completed/performed. Please assist in updating the patient chart by making an External outreach to Dr Buchanan facility located in [place]. The date of service is roughly 4 months ago.     Thank you,  Cassie Mccartney  [facility] PRIMARY CARE

## 2023-12-26 NOTE — LETTER
Diabetic Eye Exam Form    Date Requested: 23  Patient: Octavio Atkins  Patient : [id number]   Referring Provider: Davide Torres MD      DIABETIC Eye Exam Date _______________________________      Type of Exam MUST be documented for Diabetic Eye Exams. Please CHECK ONE.     Retinal Exam       Dilated Retinal Exam       OCT       Optomap-Iris Exam      Fundus Photography       Left Eye - Please check Retinopathy or No Retinopathy        Exam did show retinopathy    Exam did not show retinopathy       Right Eye - Please check Retinopathy or No Retinopathy       Exam did show retinopathy    Exam did not show retinopathy       Comments __________________________________________________________    Practice Providing Exam ______________________________________________    Exam Performed By (print name) _______________________________________      Provider Signature ___________________________________________________      These reports are needed for  compliance.  Please fax this completed form and a copy of the Diabetic Eye Exam report to our office located at [address] [place] [address] as soon as possible via Fax [phone] attention Eber: Phone [phone]  We thank you for your assistance in treating our mutual patient.

## 2023-12-27 NOTE — TELEPHONE ENCOUNTER
Upon review of the In Basket request we  Received back has not had one in 2023 scheduled soon but no date.     Any additional questions or concerns should be emailed to the Practice Liaisons via the appropriate education email address, please do not reply via In Basket.    Thank you  Eber English MA

## 2023-12-27 NOTE — TELEPHONE ENCOUNTER
Upon review of the In Basket request and the patient's chart, initial outreach has been made via fax to facility. Please see Contacts section for details.     Thank you  Eber English MA

## 2023-12-29 ENCOUNTER — HOSPITAL ENCOUNTER (OUTPATIENT)
Dept: INFUSION CENTER | Facility: HOSPITAL | Age: 78
End: 2023-12-29
Payer: MEDICARE

## 2023-12-29 DIAGNOSIS — C34.90 NSCLC METASTATIC TO BONE (HCC): Primary | ICD-10-CM

## 2023-12-29 DIAGNOSIS — C79.51 NSCLC METASTATIC TO BONE (HCC): Primary | ICD-10-CM

## 2023-12-29 LAB
ALBUMIN SERPL BCP-MCNC: 3.7 G/DL (ref 3.5–5)
ALP SERPL-CCNC: 78 U/L (ref 34–104)
ALT SERPL W P-5'-P-CCNC: 25 U/L (ref 7–52)
ANION GAP SERPL CALCULATED.3IONS-SCNC: 7 MMOL/L
AST SERPL W P-5'-P-CCNC: 26 U/L (ref 13–39)
BASOPHILS # BLD AUTO: 0.04 THOUSANDS/ÂΜL (ref 0–0.1)
BASOPHILS NFR BLD AUTO: 1 % (ref 0–1)
BILIRUB SERPL-MCNC: 0.78 MG/DL (ref 0.2–1)
BUN SERPL-MCNC: 20 MG/DL (ref 5–25)
CALCIUM SERPL-MCNC: 8.8 MG/DL (ref 8.4–10.2)
CHLORIDE SERPL-SCNC: 107 MMOL/L (ref 96–108)
CO2 SERPL-SCNC: 29 MMOL/L (ref 21–32)
CREAT SERPL-MCNC: 0.98 MG/DL (ref 0.6–1.3)
EOSINOPHIL # BLD AUTO: 0.14 THOUSAND/ÂΜL (ref 0–0.61)
EOSINOPHIL NFR BLD AUTO: 3 % (ref 0–6)
ERYTHROCYTE [DISTWIDTH] IN BLOOD BY AUTOMATED COUNT: 14.6 % (ref 11.6–15.1)
GFR SERPL CREATININE-BSD FRML MDRD: 73 ML/MIN/1.73SQ M
GLUCOSE SERPL-MCNC: 179 MG/DL (ref 65–140)
HCT VFR BLD AUTO: 44.5 % (ref 36.5–49.3)
HGB BLD-MCNC: 14.7 G/DL (ref 12–17)
IMM GRANULOCYTES # BLD AUTO: 0.03 THOUSAND/UL (ref 0–0.2)
IMM GRANULOCYTES NFR BLD AUTO: 1 % (ref 0–2)
LYMPHOCYTES # BLD AUTO: 0.63 THOUSANDS/ÂΜL (ref 0.6–4.47)
LYMPHOCYTES NFR BLD AUTO: 13 % (ref 14–44)
MCH RBC QN AUTO: 31.1 PG (ref 26.8–34.3)
MCHC RBC AUTO-ENTMCNC: 33 G/DL (ref 31.4–37.4)
MCV RBC AUTO: 94 FL (ref 82–98)
MONOCYTES # BLD AUTO: 0.67 THOUSAND/ÂΜL (ref 0.17–1.22)
MONOCYTES NFR BLD AUTO: 13 % (ref 4–12)
NEUTROPHILS # BLD AUTO: 3.55 THOUSANDS/ÂΜL (ref 1.85–7.62)
NEUTS SEG NFR BLD AUTO: 69 % (ref 43–75)
NRBC BLD AUTO-RTO: 0 /100 WBCS
PLATELET # BLD AUTO: 169 THOUSANDS/UL (ref 149–390)
PMV BLD AUTO: 11.3 FL (ref 8.9–12.7)
POTASSIUM SERPL-SCNC: 3.6 MMOL/L (ref 3.5–5.3)
PROT SERPL-MCNC: 6.9 G/DL (ref 6.4–8.4)
RBC # BLD AUTO: 4.73 MILLION/UL (ref 3.88–5.62)
SODIUM SERPL-SCNC: 143 MMOL/L (ref 135–147)
T3FREE SERPL-MCNC: 2.45 PG/ML (ref 2.5–3.9)
TSH SERPL DL<=0.05 MIU/L-ACNC: 1.23 UIU/ML (ref 0.45–4.5)
WBC # BLD AUTO: 5.06 THOUSAND/UL (ref 4.31–10.16)

## 2023-12-29 PROCEDURE — 84443 ASSAY THYROID STIM HORMONE: CPT | Performed by: INTERNAL MEDICINE

## 2023-12-29 PROCEDURE — 80053 COMPREHEN METABOLIC PANEL: CPT | Performed by: INTERNAL MEDICINE

## 2023-12-29 PROCEDURE — 85025 COMPLETE CBC W/AUTO DIFF WBC: CPT | Performed by: INTERNAL MEDICINE

## 2023-12-29 PROCEDURE — 84481 FREE ASSAY (FT-3): CPT | Performed by: INTERNAL MEDICINE

## 2023-12-29 RX ORDER — SODIUM CHLORIDE 9 MG/ML
20 INJECTION, SOLUTION INTRAVENOUS ONCE
Status: CANCELLED | OUTPATIENT
Start: 2024-01-03

## 2023-12-29 NOTE — PROGRESS NOTES
Pt arrived amb for port labs.  Accessed, labs obtained, de-accessed, dsd applied.  Disch amb to home, steady gait.

## 2024-01-03 ENCOUNTER — HOSPITAL ENCOUNTER (OUTPATIENT)
Dept: INFUSION CENTER | Facility: HOSPITAL | Age: 79
Discharge: HOME | End: 2024-01-03
Attending: INTERNAL MEDICINE
Payer: MEDICARE

## 2024-01-03 VITALS
HEART RATE: 69 BPM | SYSTOLIC BLOOD PRESSURE: 130 MMHG | OXYGEN SATURATION: 97 % | DIASTOLIC BLOOD PRESSURE: 58 MMHG | TEMPERATURE: 98.1 F | RESPIRATION RATE: 16 BRPM

## 2024-01-03 DIAGNOSIS — C79.51 NSCLC METASTATIC TO BONE (HCC): Primary | ICD-10-CM

## 2024-01-03 DIAGNOSIS — C34.90 NSCLC METASTATIC TO BONE (HCC): Primary | ICD-10-CM

## 2024-01-03 RX ORDER — SODIUM CHLORIDE 9 MG/ML
20 INJECTION, SOLUTION INTRAVENOUS ONCE
Status: COMPLETED | OUTPATIENT
Start: 2024-01-03 | End: 2024-01-03

## 2024-01-03 RX ADMIN — SODIUM CHLORIDE 20 ML/HR: 9 INJECTION, SOLUTION INTRAVENOUS at 14:07

## 2024-01-03 RX ADMIN — SODIUM CHLORIDE 200 MG: 9 INJECTION, SOLUTION INTRAVENOUS at 14:08

## 2024-01-03 NOTE — PROGRESS NOTES
Octavio Atkins  tolerated treatment well with no complications.      Octavio Atkins is aware of future appt on 1/2 at 0900 .     AVS printed and given to Octavio Atkins:  No (Declined by Octavio Atkins)

## 2024-01-07 DIAGNOSIS — C34.90 NSCLC METASTATIC TO BONE (HCC): ICD-10-CM

## 2024-01-07 DIAGNOSIS — E87.6 HYPOKALEMIA: ICD-10-CM

## 2024-01-07 DIAGNOSIS — C79.51 NSCLC METASTATIC TO BONE (HCC): ICD-10-CM

## 2024-01-08 ENCOUNTER — DOCUMENTATION (OUTPATIENT)
Dept: HEMATOLOGY ONCOLOGY | Facility: CLINIC | Age: 79
End: 2024-01-08

## 2024-01-08 RX ORDER — POTASSIUM CHLORIDE 20 MEQ/1
TABLET, EXTENDED RELEASE ORAL
Qty: 90 TABLET | Refills: 0 | Status: SHIPPED | OUTPATIENT
Start: 2024-01-08

## 2024-01-08 NOTE — PROGRESS NOTES
Follow up call to [facility], spoke with Alma Delia who stated application was received. Review for re-enrollments started 1-2-24. All applications are being reviewed in the order received.   Follow up set for 1-11

## 2024-01-10 ENCOUNTER — HOSPITAL ENCOUNTER (OUTPATIENT)
Dept: RADIOLOGY | Age: 79
Discharge: HOME | End: 2024-01-10
Payer: MEDICARE

## 2024-01-10 ENCOUNTER — DOCUMENTATION (OUTPATIENT)
Dept: HEMATOLOGY ONCOLOGY | Facility: CLINIC | Age: 79
End: 2024-01-10

## 2024-01-10 DIAGNOSIS — C34.90 NSCLC METASTATIC TO BONE (HCC): ICD-10-CM

## 2024-01-10 DIAGNOSIS — C79.51 NSCLC METASTATIC TO BONE (HCC): ICD-10-CM

## 2024-01-10 LAB — GLUCOSE SERPL-MCNC: 129 MG/DL (ref 65–140)

## 2024-01-10 PROCEDURE — A9552 F18 FDG: HCPCS

## 2024-01-10 PROCEDURE — 82948 REAGENT STRIP/BLOOD GLUCOSE: CPT

## 2024-01-10 PROCEDURE — G1004 CDSM NDSC: HCPCS

## 2024-01-10 PROCEDURE — 78815 PET IMAGE W/CT SKULL-THIGH: CPT

## 2024-01-10 NOTE — NURSING NOTE
PET/CT pre and post instructions reviewed with patient. This includes instructions of NO cell phone usage during one-hour FDG uptake phase, patient verbalizes understanding.

## 2024-01-10 NOTE — PROGRESS NOTES
[place] [facility] [place]. Per Beatris GAMBLE Patient was denied due to not meeting 3% OOP expenses for 2024. Patient must provide receipts for the amount of $1281.63 for medications. Once receipts are submitted they will reconsider application

## 2024-01-17 ENCOUNTER — OFFICE VISIT (OUTPATIENT)
Age: 79
End: 2024-01-17
Payer: MEDICARE

## 2024-01-17 ENCOUNTER — DOCUMENTATION (OUTPATIENT)
Dept: HEMATOLOGY ONCOLOGY | Facility: CLINIC | Age: 79
End: 2024-01-17

## 2024-01-17 ENCOUNTER — TELEPHONE (OUTPATIENT)
Age: 79
End: 2024-01-17

## 2024-01-17 VITALS
BODY MASS INDEX: 34.27 KG/M2 | TEMPERATURE: 96.8 F | WEIGHT: 253 LBS | SYSTOLIC BLOOD PRESSURE: 135 MMHG | DIASTOLIC BLOOD PRESSURE: 70 MMHG | HEART RATE: 81 BPM | OXYGEN SATURATION: 96 % | RESPIRATION RATE: 16 BRPM | HEIGHT: 72 IN

## 2024-01-17 DIAGNOSIS — C79.51 NSCLC METASTATIC TO BONE (HCC): Primary | ICD-10-CM

## 2024-01-17 DIAGNOSIS — C34.90 NSCLC METASTATIC TO BONE (HCC): Primary | ICD-10-CM

## 2024-01-17 PROCEDURE — 99214 OFFICE O/P EST MOD 30 MIN: CPT | Performed by: INTERNAL MEDICINE

## 2024-01-17 RX ORDER — SODIUM CHLORIDE 9 MG/ML
20 INJECTION, SOLUTION INTRAVENOUS ONCE
OUTPATIENT
Start: 2024-03-06

## 2024-01-17 RX ORDER — SODIUM CHLORIDE 9 MG/ML
20 INJECTION, SOLUTION INTRAVENOUS ONCE
OUTPATIENT
Start: 2024-02-14

## 2024-01-17 RX ORDER — SODIUM CHLORIDE 9 MG/ML
20 INJECTION, SOLUTION INTRAVENOUS ONCE
OUTPATIENT
Start: 2024-01-24

## 2024-01-17 NOTE — PROGRESS NOTES
Rcvd email from providers office regarding patients concern for eliquis renewal application.   Per previous notes entered patient was denied free drug due to not meeting OOP expense. Call was placed at the time denial was received.  Follow up call today to patient.  No response left another message for return call along with denial reason.

## 2024-01-18 NOTE — PROGRESS NOTES
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)   3/1/2018 -  Cancer Staged    Staging form: Lung, AJCC 8th Edition  - Clinical stage from 3/1/2018: Stage IVB (cTX, cN2, cM1c) - Signed by Ally Lee DO on 2022 Initial Diagnosis    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)     2022 - 2022 Chemotherapy    pembrolizumab (KEYTRUDA) IVPB, 200 mg, Intravenous, Once, 10 of 13 cycles  Administration: 200 mg (2022), 200 mg (6/15/2022), 200 mg (2022), 200 mg (2022), 200 mg (2022), 200 mg (2022), 200 mg (2022), 200 mg (10/19/2022), 200 mg (2022), 200 mg (2022)     2022 - 2022 Chemotherapy    pembrolizumab (KEYTRUDA) IVPB, 400 mg, Intravenous, Once, 1 of 6 cycles     2022 -  Chemotherapy    alteplase (CATHFLO), 2 mg, Intracatheter, Every 2 hour PRN, 19 of 25 cycles  pembrolizumab (KEYTRUDA) IVPB, 200 mg, Intravenous, Once, 19 of 25 cycles  Administration: 200 mg (2022), 200 mg (2023), 200 mg (2023), 200 mg (2023), 200 mg (3/15/2023), 200 mg (2023), 200 mg (2023), 200 mg (2023), 200 mg (2023), 200 mg (2023), 200 mg (2023), 200 mg (2023), 200 mg (2023), 200 mg (2023), 200 mg (10/11/2023), 200 mg (2023), 200 mg (2023), 200 mg (2023), 200 mg (1/3/2024)         Interval History: Patient presents for follow up of his stage IV adenocarcinoma of the lung.  In July he will have been on maintenance Keytruda for 5  years.  He had a PET scan prior to this visit that shows stable small pulmonary nodules.  There is no evidence of active disease.  The Keytruda does cause a diffuse itchy rash that he controls with frequent moisturizing and steroid and Benadryl cream when needed.  He denies any diarrhea.  He denies any other side effects from pembrolizumab.         REVIEW OF SYSTEMS:  Please note that a 14-point review of systems was performed to include Constitutional, HEENT, Respiratory, CVS, GI, , Musculoskeletal, Integumentary, Neurologic, Rheumatologic, Endocrinologic, Psychiatric, Lymphatic, and Hematologic/Oncologic systems were reviewed and are negative unless otherwise stated in HPI. Positive and negative findings pertinent to this evaluation are incorporated into the history of present illness.      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  It is bit of a controversy when to stop Keytruda when someone is doing as well as he is.  Most experts will stop after the 5-year point and some will stop after the 2-year point of someone remains in remission.  The patient would like to continue it indefinitely.  As long as is not causing any autoimmune problems and insurance pays for it I can certainly respect his request.  He will continue to follow-up in our office every 9 weeks.  I gave him samples for Eliquis today for his DVT.   There was some issue with cost after the new year and I have my finance department looking into this.  He knows to call in the interim with any questions or concerns.       I spent 30 minutes on chart review, face to face counseling time, coordination of care and documentation.    Past Medical History:   has a past medical history of Diabetes mellitus (HCC), High blood pressure, and High cholesterol.    PAST SURGICAL HISTORY:   has a past surgical history that includes Elbow surgery (Left, 2004).    CURRENT MEDICATIONS  Current Outpatient Medications   Medication Sig Dispense Refill    apixaban (Eliquis) 5 mg Take 1 tablet (5 mg total) by mouth 2 (two) times a day 60 tablet 11    Calcium Carb-Cholecalciferol (CALCIUM 1000 + D PO) Take by mouth      Cholecalciferol (Vitamin D3) 1.25 MG (42310 UT) CAPS Take by mouth      dorzolamide (TRUSOPT) 2 % ophthalmic solution       dorzolamide-timolol (COSOPT) 22.3-6.8 MG/ML ophthalmic solution INSTILL 1 DROP INTO EACH EYE TWICE DAILY      glipiZIDE (GLUCOTROL XL) 2.5 mg 24 hr tablet Take 1 tablet by mouth once daily 90 tablet 0    hydrochlorothiazide (HYDRODIURIL) 25 mg tablet Take 1 tablet by mouth once daily 90 tablet 0    lisinopril (ZESTRIL) 20 mg tablet Take 1 tablet by mouth once daily 90 tablet 0    lovastatin (MEVACOR) 20 mg tablet Take 1 tablet by mouth once daily 90 tablet 0    methocarbamol (ROBAXIN) 500 mg tablet Take 1 tablet (500 mg total) by mouth 3 (three) times a day 20 tablet 0    mupirocin (BACTROBAN) 2 % ointment Apply topically 3 (three) times a day 22 g 1    potassium chloride (K-DUR,KLOR-CON) 20 mEq tablet TAKE 1  BY MOUTH ONCE DAILY 90 tablet 0    Contour Next Test test strip USE 1 STRIP TO CHECK GLUCOSE ONCE DAILY 100 each 0    Lancet Devices (Microlet Next Lancing Device) MISC USE ONCE DAILY IN THE MORNING TO TEST BLOOD SUGAR 1 each 0    Microlet Lancets MISC USE 1  TO CHECK GLUCOSE ONCE DAILY 100 each 0     No current facility-administered  medications for this visit.     [unfilled]    SOCIAL HISTORY:   reports that he quit smoking about 18 years ago. His smoking use included cigarettes. He started smoking about 67 years ago. He has a 50.0 pack-year smoking history. He has never used smokeless tobacco. He reports current alcohol use of about 21.0 standard drinks of alcohol per week. He reports that he does not use drugs.     FAMILY HISTORY:  family history includes Colon cancer in his father.     ALLERGIES:  is allergic to aspirin and ibuprofen.      Physical Exam:  Vital Signs:   Visit Vitals  /70 (BP Location: Left arm, Patient Position: Sitting, Cuff Size: Standard)   Pulse 81   Temp (!) 96.8 °F (36 °C) (Temporal)   Resp 16   Ht 6' (1.829 m)   Wt 115 kg (253 lb)   SpO2 96%   BMI 34.31 kg/m²   Smoking Status Former   BSA 2.36 m²     Body mass index is 34.31 kg/m².  Body surface area is 2.36 meters squared.    GEN: Alert, awake oriented x3, in no acute distress  HEENT- No pallor, icterus, cyanosis, no oral mucosal lesions,   LAD - no palpable cervical, clavicle, axillary, inguinal LAD  Heart- normal S1 S2, regular rate and rhythm, No murmur, rubs.   Lungs- clear breathing sound bilateral.   Abdomen- soft, Non tender, bowel sounds present  Extremities- No cyanosis, clubbing, edema, diffuse macular rash on arms  Neuro- No focal neurological deficit    Labs:  Lab Results   Component Value Date    WBC 5.06 12/29/2023    HGB 14.7 12/29/2023    HCT 44.5 12/29/2023    MCV 94 12/29/2023     12/29/2023     Lab Results   Component Value Date    SODIUM 143 12/29/2023    K 3.6 12/29/2023     12/29/2023    CO2 29 12/29/2023    AGAP 7 12/29/2023    BUN 20 12/29/2023    CREATININE 0.98 12/29/2023    GLUC 179 (H) 12/29/2023    GLUF 188 (H) 01/30/2023    CALCIUM 8.8 12/29/2023    AST 26 12/29/2023    ALT 25 12/29/2023    ALKPHOS 78 12/29/2023    TP 6.9 12/29/2023    TBILI 0.78 12/29/2023    EGFR 73 12/29/2023

## 2024-01-22 ENCOUNTER — HOSPITAL ENCOUNTER (OUTPATIENT)
Dept: INFUSION CENTER | Facility: HOSPITAL | Age: 79
Discharge: HOME | End: 2024-01-22
Payer: MEDICARE

## 2024-01-22 DIAGNOSIS — C34.90 NSCLC METASTATIC TO BONE (HCC): Primary | ICD-10-CM

## 2024-01-22 DIAGNOSIS — C79.51 NSCLC METASTATIC TO BONE (HCC): Primary | ICD-10-CM

## 2024-01-22 LAB
ALBUMIN SERPL BCP-MCNC: 3.7 G/DL (ref 3.5–5)
ALP SERPL-CCNC: 73 U/L (ref 34–104)
ALT SERPL W P-5'-P-CCNC: 26 U/L (ref 7–52)
ANION GAP SERPL CALCULATED.3IONS-SCNC: 8 MMOL/L
AST SERPL W P-5'-P-CCNC: 24 U/L (ref 13–39)
BASOPHILS # BLD AUTO: 0.04 THOUSANDS/ÂΜL (ref 0–0.1)
BASOPHILS NFR BLD AUTO: 1 % (ref 0–1)
BILIRUB SERPL-MCNC: 0.91 MG/DL (ref 0.2–1)
BUN SERPL-MCNC: 20 MG/DL (ref 5–25)
CALCIUM SERPL-MCNC: 9.1 MG/DL (ref 8.4–10.2)
CHLORIDE SERPL-SCNC: 106 MMOL/L (ref 96–108)
CO2 SERPL-SCNC: 28 MMOL/L (ref 21–32)
CREAT SERPL-MCNC: 1.06 MG/DL (ref 0.6–1.3)
EOSINOPHIL # BLD AUTO: 0.14 THOUSAND/ÂΜL (ref 0–0.61)
EOSINOPHIL NFR BLD AUTO: 3 % (ref 0–6)
ERYTHROCYTE [DISTWIDTH] IN BLOOD BY AUTOMATED COUNT: 14.6 % (ref 11.6–15.1)
GFR SERPL CREATININE-BSD FRML MDRD: 66 ML/MIN/1.73SQ M
GLUCOSE SERPL-MCNC: 239 MG/DL (ref 65–140)
HCT VFR BLD AUTO: 46.6 % (ref 36.5–49.3)
HGB BLD-MCNC: 15.6 G/DL (ref 12–17)
IMM GRANULOCYTES # BLD AUTO: 0.01 THOUSAND/UL (ref 0–0.2)
IMM GRANULOCYTES NFR BLD AUTO: 0 % (ref 0–2)
LYMPHOCYTES # BLD AUTO: 0.57 THOUSANDS/ÂΜL (ref 0.6–4.47)
LYMPHOCYTES NFR BLD AUTO: 14 % (ref 14–44)
MCH RBC QN AUTO: 31.8 PG (ref 26.8–34.3)
MCHC RBC AUTO-ENTMCNC: 33.5 G/DL (ref 31.4–37.4)
MCV RBC AUTO: 95 FL (ref 82–98)
MONOCYTES # BLD AUTO: 0.32 THOUSAND/ÂΜL (ref 0.17–1.22)
MONOCYTES NFR BLD AUTO: 8 % (ref 4–12)
NEUTROPHILS # BLD AUTO: 3.13 THOUSANDS/ÂΜL (ref 1.85–7.62)
NEUTS SEG NFR BLD AUTO: 74 % (ref 43–75)
NRBC BLD AUTO-RTO: 0 /100 WBCS
PLATELET # BLD AUTO: 169 THOUSANDS/UL (ref 149–390)
PMV BLD AUTO: 11.3 FL (ref 8.9–12.7)
POTASSIUM SERPL-SCNC: 3.8 MMOL/L (ref 3.5–5.3)
PROT SERPL-MCNC: 7.1 G/DL (ref 6.4–8.4)
RBC # BLD AUTO: 4.91 MILLION/UL (ref 3.88–5.62)
SODIUM SERPL-SCNC: 142 MMOL/L (ref 135–147)
T3FREE SERPL-MCNC: 4 PG/ML (ref 2.5–3.9)
TSH SERPL DL<=0.05 MIU/L-ACNC: 1.17 UIU/ML (ref 0.45–4.5)
WBC # BLD AUTO: 4.21 THOUSAND/UL (ref 4.31–10.16)

## 2024-01-22 PROCEDURE — 84443 ASSAY THYROID STIM HORMONE: CPT | Performed by: INTERNAL MEDICINE

## 2024-01-22 PROCEDURE — 80053 COMPREHEN METABOLIC PANEL: CPT | Performed by: INTERNAL MEDICINE

## 2024-01-22 PROCEDURE — 84481 FREE ASSAY (FT-3): CPT | Performed by: INTERNAL MEDICINE

## 2024-01-22 PROCEDURE — 85025 COMPLETE CBC W/AUTO DIFF WBC: CPT | Performed by: INTERNAL MEDICINE

## 2024-01-22 NOTE — PROGRESS NOTES
Octavio Atkins  tolerated treatment well with no complications.      Octavio Atkins is aware of future appt on 01/24/2024 at 0900.     AVS printed and given to Octavio Atkins:    No (Declined by Octavio Atkins)

## 2024-01-23 ENCOUNTER — TELEPHONE (OUTPATIENT)
Dept: FAMILY MEDICINE CLINIC | Facility: HOSPITAL | Age: 79
End: 2024-01-23

## 2024-01-23 NOTE — TELEPHONE ENCOUNTER
Hello, my name is Zainab, I'm calling from [employer]. It's recorded line. This call is regarding an appending prescription for patient of doctor Denise Jaime. Patient name is Octavio Avila. YOB: 1945 and this prescription was backed on January 21. Keep fax number [phone]. Kindly return our call at [phone]. Again [phone] if this is not being received. Otherwise, please fax back [phone] again [phone]. Thank you.  You received a voice mail from BRITTANY LinPrim, Shanda.

## 2024-01-23 NOTE — TELEPHONE ENCOUNTER
Called number provided, after finally getting through to someone they were unable to help and advised I call [phone].  Will attempt that number later

## 2024-01-24 ENCOUNTER — TELEPHONE (OUTPATIENT)
Age: 79
End: 2024-01-24

## 2024-01-24 ENCOUNTER — HOSPITAL ENCOUNTER (OUTPATIENT)
Dept: INFUSION CENTER | Facility: HOSPITAL | Age: 79
Discharge: HOME | End: 2024-01-24
Attending: INTERNAL MEDICINE

## 2024-01-24 NOTE — TELEPHONE ENCOUNTER
Spoke with patient's wife regarding his missed infusion appt. She states that he was up all night with a cough and 99.5 temperature, but he is currently sleeping. They have no plans to follow-up with his pcp or urgent care at this time. Patient will continue taking otc medications as needed to help with his symptoms. Patient's infusion appt canceled today and I informed her to call me if they need anything else. Patient's wife verbalized understanding.

## 2024-01-29 ENCOUNTER — TELEPHONE (OUTPATIENT)
Dept: FAMILY MEDICINE CLINIC | Facility: HOSPITAL | Age: 79
End: 2024-01-29

## 2024-01-29 NOTE — TELEPHONE ENCOUNTER
Hi, my name is Bobbi Atkins. I'm calling from my  Octavio Atkins, Birth date [birth year]. I spoke with someone last week regarding his prescriptions for his ostomy supplies and there still seems to be a problem with it. If somebody could please give me a call back. My phone number is [phone]. Thank you.

## 2024-01-29 NOTE — TELEPHONE ENCOUNTER
Advised pt and wife that 2 different orders were faxed: one on 1/24 and one on 1/29. Refaxed both orders. Fax confirmed.

## 2024-01-30 DIAGNOSIS — I10 ESSENTIAL HYPERTENSION: ICD-10-CM

## 2024-01-31 RX ORDER — HYDROCHLOROTHIAZIDE 25 MG/1
25 TABLET ORAL DAILY
Qty: 90 TABLET | Refills: 0 | Status: SHIPPED | OUTPATIENT
Start: 2024-01-31

## 2024-01-31 RX ORDER — LISINOPRIL 20 MG/1
20 TABLET ORAL DAILY
Qty: 90 TABLET | Refills: 0 | Status: SHIPPED | OUTPATIENT
Start: 2024-01-31

## 2024-02-03 DIAGNOSIS — E78.2 MIXED HYPERLIPIDEMIA: ICD-10-CM

## 2024-02-03 DIAGNOSIS — E11.65 TYPE 2 DIABETES MELLITUS WITH HYPERGLYCEMIA, WITHOUT LONG-TERM CURRENT USE OF INSULIN (HCC): ICD-10-CM

## 2024-02-04 RX ORDER — LOVASTATIN 20 MG/1
TABLET ORAL
Qty: 90 TABLET | Refills: 0 | Status: SHIPPED | OUTPATIENT
Start: 2024-02-04

## 2024-02-04 RX ORDER — GLIPIZIDE 2.5 MG/1
TABLET, EXTENDED RELEASE ORAL
Qty: 90 TABLET | Refills: 0 | Status: SHIPPED | OUTPATIENT
Start: 2024-02-04

## 2024-02-12 ENCOUNTER — HOSPITAL ENCOUNTER (OUTPATIENT)
Dept: INFUSION CENTER | Facility: HOSPITAL | Age: 79
Discharge: HOME | End: 2024-02-12
Payer: MEDICARE

## 2024-02-12 DIAGNOSIS — C34.90 NSCLC METASTATIC TO BONE (HCC): Primary | ICD-10-CM

## 2024-02-12 DIAGNOSIS — C79.51 NSCLC METASTATIC TO BONE (HCC): Primary | ICD-10-CM

## 2024-02-12 LAB
ALBUMIN SERPL BCP-MCNC: 3.9 G/DL (ref 3.5–5)
ALP SERPL-CCNC: 79 U/L (ref 34–104)
ALT SERPL W P-5'-P-CCNC: 22 U/L (ref 7–52)
ANION GAP SERPL CALCULATED.3IONS-SCNC: 7 MMOL/L
AST SERPL W P-5'-P-CCNC: 22 U/L (ref 13–39)
BASOPHILS # BLD AUTO: 0.05 THOUSANDS/ÂΜL (ref 0–0.1)
BASOPHILS NFR BLD AUTO: 1 % (ref 0–1)
BILIRUB SERPL-MCNC: 0.63 MG/DL (ref 0.2–1)
BUN SERPL-MCNC: 18 MG/DL (ref 5–25)
CALCIUM SERPL-MCNC: 9.6 MG/DL (ref 8.4–10.2)
CHLORIDE SERPL-SCNC: 109 MMOL/L (ref 96–108)
CO2 SERPL-SCNC: 27 MMOL/L (ref 21–32)
CREAT SERPL-MCNC: 0.96 MG/DL (ref 0.6–1.3)
EOSINOPHIL # BLD AUTO: 0.14 THOUSAND/ÂΜL (ref 0–0.61)
EOSINOPHIL NFR BLD AUTO: 3 % (ref 0–6)
ERYTHROCYTE [DISTWIDTH] IN BLOOD BY AUTOMATED COUNT: 14.4 % (ref 11.6–15.1)
GFR SERPL CREATININE-BSD FRML MDRD: 74 ML/MIN/1.73SQ M
GLUCOSE SERPL-MCNC: 97 MG/DL (ref 65–140)
HCT VFR BLD AUTO: 46.7 % (ref 36.5–49.3)
HGB BLD-MCNC: 15.4 G/DL (ref 12–17)
IMM GRANULOCYTES # BLD AUTO: 0.03 THOUSAND/UL (ref 0–0.2)
IMM GRANULOCYTES NFR BLD AUTO: 1 % (ref 0–2)
LYMPHOCYTES # BLD AUTO: 0.62 THOUSANDS/ÂΜL (ref 0.6–4.47)
LYMPHOCYTES NFR BLD AUTO: 11 % (ref 14–44)
MCH RBC QN AUTO: 31.1 PG (ref 26.8–34.3)
MCHC RBC AUTO-ENTMCNC: 33 G/DL (ref 31.4–37.4)
MCV RBC AUTO: 94 FL (ref 82–98)
MONOCYTES # BLD AUTO: 0.7 THOUSAND/ÂΜL (ref 0.17–1.22)
MONOCYTES NFR BLD AUTO: 13 % (ref 4–12)
NEUTROPHILS # BLD AUTO: 4.05 THOUSANDS/ÂΜL (ref 1.85–7.62)
NEUTS SEG NFR BLD AUTO: 71 % (ref 43–75)
NRBC BLD AUTO-RTO: 0 /100 WBCS
PLATELET # BLD AUTO: 163 THOUSANDS/UL (ref 149–390)
PMV BLD AUTO: 10.7 FL (ref 8.9–12.7)
POTASSIUM SERPL-SCNC: 3.8 MMOL/L (ref 3.5–5.3)
PROT SERPL-MCNC: 7.2 G/DL (ref 6.4–8.4)
RBC # BLD AUTO: 4.95 MILLION/UL (ref 3.88–5.62)
SODIUM SERPL-SCNC: 143 MMOL/L (ref 135–147)
T3FREE SERPL-MCNC: 3.39 PG/ML (ref 2.5–3.9)
TSH SERPL DL<=0.05 MIU/L-ACNC: 1.27 UIU/ML (ref 0.45–4.5)
WBC # BLD AUTO: 5.59 THOUSAND/UL (ref 4.31–10.16)

## 2024-02-12 PROCEDURE — 84443 ASSAY THYROID STIM HORMONE: CPT | Performed by: INTERNAL MEDICINE

## 2024-02-12 PROCEDURE — 80053 COMPREHEN METABOLIC PANEL: CPT | Performed by: INTERNAL MEDICINE

## 2024-02-12 PROCEDURE — 85025 COMPLETE CBC W/AUTO DIFF WBC: CPT | Performed by: INTERNAL MEDICINE

## 2024-02-12 PROCEDURE — 84481 FREE ASSAY (FT-3): CPT | Performed by: INTERNAL MEDICINE

## 2024-02-12 NOTE — PROGRESS NOTES
Octaivo Atkins  tolerated treatment well with no complications.      Octavio Atkins is aware of future appt on 02/14/2024 at 0830.     AVS printed and given to Octavio Atkins:  No (Declined by Octavio Atkins)

## 2024-02-14 ENCOUNTER — HOSPITAL ENCOUNTER (OUTPATIENT)
Dept: INFUSION CENTER | Facility: HOSPITAL | Age: 79
Discharge: HOME | End: 2024-02-14
Attending: INTERNAL MEDICINE
Payer: MEDICARE

## 2024-02-14 VITALS
WEIGHT: 252.43 LBS | HEIGHT: 72 IN | TEMPERATURE: 97.6 F | DIASTOLIC BLOOD PRESSURE: 64 MMHG | OXYGEN SATURATION: 96 % | BODY MASS INDEX: 34.19 KG/M2 | HEART RATE: 83 BPM | SYSTOLIC BLOOD PRESSURE: 138 MMHG | RESPIRATION RATE: 16 BRPM

## 2024-02-14 DIAGNOSIS — C79.51 NSCLC METASTATIC TO BONE (HCC): Primary | ICD-10-CM

## 2024-02-14 DIAGNOSIS — C34.90 NSCLC METASTATIC TO BONE (HCC): Primary | ICD-10-CM

## 2024-02-14 RX ORDER — SODIUM CHLORIDE 9 MG/ML
20 INJECTION, SOLUTION INTRAVENOUS ONCE
Status: COMPLETED | OUTPATIENT
Start: 2024-02-14 | End: 2024-02-14

## 2024-02-14 RX ADMIN — SODIUM CHLORIDE 20 ML/HR: 9 INJECTION, SOLUTION INTRAVENOUS at 09:18

## 2024-02-14 RX ADMIN — SODIUM CHLORIDE 200 MG: 9 INJECTION, SOLUTION INTRAVENOUS at 09:21

## 2024-02-14 NOTE — PROGRESS NOTES
..Octavio Atkins  tolerated treatment well with no complications.      Octavio Atkins is aware of future appt on 3/4 at 11:00.     AVS printed and given to Octavio Atkins:  No (Declined by Octavio Atkins)

## 2024-02-20 PROBLEM — Z00.00 MEDICARE ANNUAL WELLNESS VISIT, SUBSEQUENT: Status: RESOLVED | Noted: 2022-10-17 | Resolved: 2024-02-20

## 2024-03-04 ENCOUNTER — TELEPHONE (OUTPATIENT)
Age: 79
End: 2024-03-04

## 2024-03-04 DIAGNOSIS — C34.90 NSCLC METASTATIC TO BONE (HCC): Primary | ICD-10-CM

## 2024-03-04 DIAGNOSIS — C79.51 NSCLC METASTATIC TO BONE (HCC): Primary | ICD-10-CM

## 2024-03-04 NOTE — TELEPHONE ENCOUNTER
Received message that patient had canceled his treatment this week due to not feeling well. I did reach out and speak to Octavio's wife. She said that he started with cold symptoms on Sunday morning. He has been experiencing a low grade fever (tmax of 99.7 last night). He has nasal congestion and a productive cough. She said he is coughing up mucous but was unable to tell me consistency/color of his mucous. Also complaining of a sore throat. No sick contacts and has not taken a COVID test. He just started  taking coricidin max strength for the cough/congestion and is taking his first dose now. He is going to do a home covid test and will call us back with the result.

## 2024-03-06 ENCOUNTER — HOSPITAL ENCOUNTER (OUTPATIENT)
Dept: INFUSION CENTER | Facility: HOSPITAL | Age: 79
End: 2024-03-06
Attending: INTERNAL MEDICINE

## 2024-03-07 ENCOUNTER — OFFICE VISIT (OUTPATIENT)
Dept: FAMILY MEDICINE CLINIC | Facility: HOSPITAL | Age: 79
End: 2024-03-07
Payer: MEDICARE

## 2024-03-07 VITALS
HEIGHT: 72 IN | WEIGHT: 252 LBS | TEMPERATURE: 98.6 F | HEART RATE: 86 BPM | DIASTOLIC BLOOD PRESSURE: 78 MMHG | BODY MASS INDEX: 34.13 KG/M2 | SYSTOLIC BLOOD PRESSURE: 136 MMHG

## 2024-03-07 DIAGNOSIS — L73.9 FOLLICULITIS: ICD-10-CM

## 2024-03-07 DIAGNOSIS — Z93.3 COLOSTOMY IN PLACE (HCC): ICD-10-CM

## 2024-03-07 DIAGNOSIS — E11.65 TYPE 2 DIABETES MELLITUS WITH HYPERGLYCEMIA, WITHOUT LONG-TERM CURRENT USE OF INSULIN (HCC): ICD-10-CM

## 2024-03-07 DIAGNOSIS — J98.9 RESPIRATORY ILLNESS: Primary | ICD-10-CM

## 2024-03-07 DIAGNOSIS — J43.9 PULMONARY EMPHYSEMA, UNSPECIFIED EMPHYSEMA TYPE (HCC): ICD-10-CM

## 2024-03-07 PROBLEM — I82.412 DVT OF DEEP FEMORAL VEIN, LEFT (HCC): Status: RESOLVED | Noted: 2023-04-18 | Resolved: 2024-03-07

## 2024-03-07 PROCEDURE — 99214 OFFICE O/P EST MOD 30 MIN: CPT | Performed by: INTERNAL MEDICINE

## 2024-03-07 PROCEDURE — G2211 COMPLEX E/M VISIT ADD ON: HCPCS | Performed by: INTERNAL MEDICINE

## 2024-03-07 RX ORDER — DOXYCYCLINE HYCLATE 100 MG/1
100 CAPSULE ORAL EVERY 12 HOURS SCHEDULED
Qty: 14 CAPSULE | Refills: 0 | Status: SHIPPED | OUTPATIENT
Start: 2024-03-07 | End: 2024-03-14

## 2024-03-07 NOTE — ASSESSMENT & PLAN NOTE
Some spike in BS early on with cough  meds - switched to Coricidin and BS back to baseline, will monitor for now, did review with pt that significant illness can cause spikes in BS - call if this were to occur  Lab Results   Component Value Date    HGBA1C 6.2 12/22/2023

## 2024-03-07 NOTE — PROGRESS NOTES
Name: Octavio Atkins      : 1945      MRN: [id number]  Encounter Provider: Karen Espinoza DO  Encounter Date: 3/7/2024   Encounter department: [facility] [id number]     Assessment & Plan     1. Respiratory illness  Comments:  Reassured symptoms short lived and exam and vitals nml but pt higher risk with age/DM/cancer tx, will start Doxy 100 mg bid, encouraged gargles/hot tea/lozenges/rest/fluids and OTC cough medication, watch sugars as they can spike with significant illness - call if this occurs, d/w pt that cough can last 4-6 wks but call if symptoms no better at all by end of abx OR with new/worse symptoms  Orders:  -     doxycycline hyclate (VIBRAMYCIN) 100 mg capsule; Take 1 capsule (100 mg total) by mouth every 12 (twelve) hours for 7 days    2. Pulmonary emphysema, unspecified emphysema type (HCC)  Comments:  Pt unaware of this dx, no chronic symptoms or daily maintenance inhaler used, no s/sx of exacerbation - call if they occur (reviewed with pt in detail)    3. Type 2 diabetes mellitus with hyperglycemia, without long-term current use of insulin ([facility])  Assessment & Plan:  Some spike in BS early on with cough  meds - switched to Coricidin and BS back to baseline, will monitor for now, did review with pt that significant illness can cause spikes in BS - call if this were to occur  Lab Results   Component Value Date    HGBA1C 6.2 2023     4. Colostomy in place ([facility])  Assessment & Plan:  Some looser stools early on - has resolved, call with issues    5. Folliculitis  Comments:  med list updated, only using mupirocin prn  Orders:  -     mupirocin (BACTROBAN) 2 % ointment; Apply topically 3 (three) times a day as needed (skin infection)           Subjective      HPI Pt here for an acute visit    Pt here with 6 days of resp symptoms. Saturday pt started with a ST and nasal congestion.  He notes cough productive of phlegm.  He has had no F/C/SOB/wheezing/ear  pain/dizziness. He had some HA's early on but that has resolved.  He has had no N/V and states his appetite is good.  He has an colostomy and notes no changes in bowels. He tried some Tussin early on but that elevated his BS with his DM and caused some diarrhea. He switched to Coricidin HBP and that has helped both his symptoms and his elevated sugars/stools. He notes no sick contacts.  He did a COVID test Mon and it was neg.  He had a flu vaccine in Dec.  He is undergoing cancer tx and they were put on hold this week d/t his symptoms and are rescheduled till next week.        Review of Systems   Constitutional:  Positive for fatigue. Negative for appetite change, chills and fever.   HENT:  Positive for congestion and sore throat. Negative for ear pain.    Eyes:  Negative for discharge and redness.   Respiratory:  Positive for cough. Negative for chest tightness, shortness of breath and wheezing.    Cardiovascular:  Negative for chest pain and palpitations.   Gastrointestinal:  Positive for diarrhea. Negative for abdominal pain, blood in stool, nausea and vomiting.   Genitourinary:  Negative for difficulty urinating and dysuria.   Musculoskeletal:  Negative for arthralgias and myalgias.   Skin:  Positive for rash. Negative for wound.        Chronic unchanged rash from chemo meds   Neurological:  Positive for headaches. Negative for dizziness.   Hematological:  Negative for adenopathy.   Psychiatric/Behavioral:  Negative for confusion.        Current Outpatient Medications on File Prior to Visit   Medication Sig    apixaban (Eliquis) 5 mg Take 1 tablet (5 mg total) by mouth 2 (two) times a day    Calcium Carb-Cholecalciferol (CALCIUM 1000 + D PO) Take by mouth    Cholecalciferol (Vitamin D3) 1.25 MG (16125 UT) CAPS Take by mouth    Contour Next Test test strip USE 1 STRIP TO CHECK GLUCOSE ONCE DAILY    dorzolamide (TRUSOPT) 2 % ophthalmic solution     dorzolamide-timolol (COSOPT) 22.3-6.8 MG/ML ophthalmic solution  INSTILL 1 DROP INTO EACH EYE TWICE DAILY    glipiZIDE (GLUCOTROL XL) 2.5 mg 24 hr tablet Take 1 tablet by mouth once daily    hydroCHLOROthiazide 25 mg tablet Take 1 tablet by mouth once daily    Lancet Devices (Microlet Next Lancing Device) MISC USE ONCE DAILY IN THE MORNING TO TEST BLOOD SUGAR    lisinopril (ZESTRIL) 20 mg tablet Take 1 tablet by mouth once daily    lovastatin (MEVACOR) 20 mg tablet Take 1 tablet by mouth once daily    methocarbamol (ROBAXIN) 500 mg tablet Take 1 tablet (500 mg total) by mouth 3 (three) times a day    Microlet Lancets MISC USE 1  TO CHECK GLUCOSE ONCE DAILY    potassium chloride (K-DUR,KLOR-CON) 20 mEq tablet TAKE 1  BY MOUTH ONCE DAILY    [DISCONTINUED] mupirocin (BACTROBAN) 2 % ointment Apply topically 3 (three) times a day       Objective     /78   Pulse 86   Temp 98.6 °F (37 °C)   Ht 6' (1.829 m)   Wt 114 kg (252 lb)   BMI 34.18 kg/m²     Physical Exam  Vitals and nursing note reviewed.   Constitutional:       General: He is not in acute distress.     Appearance: He is well-developed. He is not ill-appearing.   HENT:      Head: Normocephalic and atraumatic.      Right Ear: Ear canal normal. No swelling or tenderness. No middle ear effusion. Tympanic membrane is erythematous.      Left Ear: Tympanic membrane and ear canal normal. No swelling or tenderness.  No middle ear effusion. Tympanic membrane is not erythematous.      Mouth/Throat:      Mouth: Mucous membranes are moist.      Pharynx: Oropharynx is clear. No pharyngeal swelling, oropharyngeal exudate or posterior oropharyngeal erythema.      Tonsils: No tonsillar exudate.   Eyes:      General:         Right eye: No discharge.         Left eye: No discharge.      Conjunctiva/sclera: Conjunctivae normal.   Neck:      Trachea: No tracheal deviation.   Cardiovascular:      Rate and Rhythm: Normal rate and regular rhythm.      Heart sounds: No murmur heard.  Pulmonary:      Effort: Pulmonary effort is normal. No  respiratory distress.      Breath sounds: Normal breath sounds. No wheezing, rhonchi or rales.   Musculoskeletal:         General: No deformity or signs of injury.      Cervical back: Neck supple.   Lymphadenopathy:      Cervical: Cervical adenopathy present.   Skin:     General: Skin is warm and dry.      Coloration: Skin is not pale.      Findings: No rash.   Neurological:      General: No focal deficit present.      Mental Status: He is alert. Mental status is at baseline.      Motor: No abnormal muscle tone.      Gait: Gait normal.   Psychiatric:         Mood and Affect: Mood normal.         Behavior: Behavior normal.         Thought Content: Thought content normal.         Judgment: Judgment normal.       Karen Espinoza DO

## 2024-03-11 ENCOUNTER — HOSPITAL ENCOUNTER (OUTPATIENT)
Dept: INFUSION CENTER | Facility: HOSPITAL | Age: 79
Discharge: HOME | End: 2024-03-11
Payer: MEDICARE

## 2024-03-11 DIAGNOSIS — C79.51 NSCLC METASTATIC TO BONE (HCC): Primary | ICD-10-CM

## 2024-03-11 DIAGNOSIS — C34.90 NSCLC METASTATIC TO BONE (HCC): Primary | ICD-10-CM

## 2024-03-11 LAB
ALBUMIN SERPL BCP-MCNC: 3.7 G/DL (ref 3.5–5)
ALP SERPL-CCNC: 88 U/L (ref 34–104)
ALT SERPL W P-5'-P-CCNC: 28 U/L (ref 7–52)
ANION GAP SERPL CALCULATED.3IONS-SCNC: 6 MMOL/L
AST SERPL W P-5'-P-CCNC: 28 U/L (ref 13–39)
BASOPHILS # BLD AUTO: 0.06 THOUSANDS/ÂΜL (ref 0–0.1)
BASOPHILS NFR BLD AUTO: 2 % (ref 0–1)
BILIRUB SERPL-MCNC: 0.62 MG/DL (ref 0.2–1)
BUN SERPL-MCNC: 21 MG/DL (ref 5–25)
CALCIUM SERPL-MCNC: 9.2 MG/DL (ref 8.4–10.2)
CHLORIDE SERPL-SCNC: 107 MMOL/L (ref 96–108)
CO2 SERPL-SCNC: 29 MMOL/L (ref 21–32)
CREAT SERPL-MCNC: 0.82 MG/DL (ref 0.6–1.3)
EOSINOPHIL # BLD AUTO: 0.16 THOUSAND/ÂΜL (ref 0–0.61)
EOSINOPHIL NFR BLD AUTO: 4 % (ref 0–6)
ERYTHROCYTE [DISTWIDTH] IN BLOOD BY AUTOMATED COUNT: 14.3 % (ref 11.6–15.1)
GFR SERPL CREATININE-BSD FRML MDRD: 84 ML/MIN/1.73SQ M
GLUCOSE SERPL-MCNC: 140 MG/DL (ref 65–140)
HCT VFR BLD AUTO: 45.7 % (ref 36.5–49.3)
HGB BLD-MCNC: 15.3 G/DL (ref 12–17)
IMM GRANULOCYTES # BLD AUTO: 0.02 THOUSAND/UL (ref 0–0.2)
IMM GRANULOCYTES NFR BLD AUTO: 1 % (ref 0–2)
LYMPHOCYTES # BLD AUTO: 0.56 THOUSANDS/ÂΜL (ref 0.6–4.47)
LYMPHOCYTES NFR BLD AUTO: 15 % (ref 14–44)
MCH RBC QN AUTO: 31.3 PG (ref 26.8–34.3)
MCHC RBC AUTO-ENTMCNC: 33.5 G/DL (ref 31.4–37.4)
MCV RBC AUTO: 94 FL (ref 82–98)
MONOCYTES # BLD AUTO: 0.42 THOUSAND/ÂΜL (ref 0.17–1.22)
MONOCYTES NFR BLD AUTO: 11 % (ref 4–12)
NEUTROPHILS # BLD AUTO: 2.55 THOUSANDS/ÂΜL (ref 1.85–7.62)
NEUTS SEG NFR BLD AUTO: 67 % (ref 43–75)
NRBC BLD AUTO-RTO: 0 /100 WBCS
PLATELET # BLD AUTO: 189 THOUSANDS/UL (ref 149–390)
PMV BLD AUTO: 10.8 FL (ref 8.9–12.7)
POTASSIUM SERPL-SCNC: 3.7 MMOL/L (ref 3.5–5.3)
PROT SERPL-MCNC: 7 G/DL (ref 6.4–8.4)
RBC # BLD AUTO: 4.89 MILLION/UL (ref 3.88–5.62)
SODIUM SERPL-SCNC: 142 MMOL/L (ref 135–147)
T3FREE SERPL-MCNC: 3.73 PG/ML (ref 2.5–3.9)
TSH SERPL DL<=0.05 MIU/L-ACNC: 1.73 UIU/ML (ref 0.45–4.5)
WBC # BLD AUTO: 3.77 THOUSAND/UL (ref 4.31–10.16)

## 2024-03-11 PROCEDURE — 80053 COMPREHEN METABOLIC PANEL: CPT | Performed by: INTERNAL MEDICINE

## 2024-03-11 PROCEDURE — 84443 ASSAY THYROID STIM HORMONE: CPT | Performed by: INTERNAL MEDICINE

## 2024-03-11 PROCEDURE — 85025 COMPLETE CBC W/AUTO DIFF WBC: CPT | Performed by: INTERNAL MEDICINE

## 2024-03-11 PROCEDURE — 84481 FREE ASSAY (FT-3): CPT | Performed by: INTERNAL MEDICINE

## 2024-03-12 LAB
LEFT EYE DIABETIC RETINOPATHY: NORMAL
RIGHT EYE DIABETIC RETINOPATHY: NORMAL

## 2024-03-14 ENCOUNTER — HOSPITAL ENCOUNTER (OUTPATIENT)
Dept: INFUSION CENTER | Facility: HOSPITAL | Age: 79
Discharge: HOME | End: 2024-03-14
Attending: INTERNAL MEDICINE
Payer: MEDICARE

## 2024-03-14 VITALS
BODY MASS INDEX: 33.76 KG/M2 | OXYGEN SATURATION: 95 % | WEIGHT: 248.9 LBS | HEART RATE: 64 BPM | DIASTOLIC BLOOD PRESSURE: 69 MMHG | RESPIRATION RATE: 16 BRPM | SYSTOLIC BLOOD PRESSURE: 132 MMHG | TEMPERATURE: 97.2 F

## 2024-03-14 DIAGNOSIS — C34.90 NSCLC METASTATIC TO BONE (HCC): Primary | ICD-10-CM

## 2024-03-14 DIAGNOSIS — C79.51 NSCLC METASTATIC TO BONE (HCC): Primary | ICD-10-CM

## 2024-03-14 PROCEDURE — 96413 CHEMO IV INFUSION 1 HR: CPT

## 2024-03-14 RX ORDER — SODIUM CHLORIDE 9 MG/ML
20 INJECTION, SOLUTION INTRAVENOUS ONCE
Status: COMPLETED | OUTPATIENT
Start: 2024-03-14 | End: 2024-03-14

## 2024-03-14 RX ADMIN — SODIUM CHLORIDE 20 ML/HR: 9 INJECTION, SOLUTION INTRAVENOUS at 09:10

## 2024-03-14 RX ADMIN — SODIUM CHLORIDE 200 MG: 9 INJECTION, SOLUTION INTRAVENOUS at 09:10

## 2024-03-14 NOTE — PROGRESS NOTES
Octavio Atkins  tolerated treatment well with no complications.      Octavio Atkins is aware of future appt on 4/1 at 9am.     AVS printed and given to Octavio Atkins:  No (Declined by Octavio Atkins)

## 2024-03-17 NOTE — PROGRESS NOTES
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)   3/1/2018 -  Cancer Staged    Staging form: Lung, AJCC 8th Edition  - Clinical stage from 3/1/2018: Stage IVB (cTX, cN2, cM1c) - Signed by Ally Lee DO on 2022 Initial Diagnosis    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)     2022 - 2022 Chemotherapy    pembrolizumab (KEYTRUDA) IVPB, 200 mg, Intravenous, Once, 10 of 13 cycles  Administration: 200 mg (2022), 200 mg (6/15/2022), 200 mg (2022), 200 mg (2022), 200 mg (2022), 200 mg (2022), 200 mg (2022), 200 mg (10/19/2022), 200 mg (2022), 200 mg (2022)     2022 - 2022 Chemotherapy    pembrolizumab (KEYTRUDA) IVPB, 400 mg, Intravenous, Once, 1 of 6 cycles     2022 -  Chemotherapy    alteplase (CATHFLO), 2 mg, Intracatheter, Every 2 hour PRN, 21 of 24 cycles  pembrolizumab (KEYTRUDA) IVPB, 200 mg, Intravenous, Once, 21 of 24 cycles  Administration: 200 mg (2022), 200 mg (2023), 200 mg (2023), 200 mg (2023), 200 mg (3/15/2023), 200 mg (2023), 200 mg (2023), 200 mg (2023), 200 mg (2023), 200 mg (2023), 200 mg (2023), 200 mg (2023), 200 mg (2023), 200 mg (2023), 200 mg (10/11/2023), 200 mg (2023), 200 mg (2023), 200 mg (2023), 200 mg (1/3/2024), 200 mg (2024), 200 mg (3/14/2024)         Interval History: Patient returns for follow-up visit.  He continues to receive Keytruda  every 3 weeks and continues to tolerate it quite well.  His only real side effect is of a mild rash on his arms that is itchy at times.  He can manage this with steroid cream as needed.  Denies any cough, chest pain, shortness of breath, no hemoptysis.  No significant fatigue.  Blood work remains in normal range.      REVIEW OF SYSTEMS:  Please note that a 14-point review of systems was performed to include Constitutional, HEENT, Respiratory, CVS, GI, , Musculoskeletal, Integumentary, Neurologic, Rheumatologic, Endocrinologic, Psychiatric, Lymphatic, and Hematologic/Oncologic systems were reviewed and are negative unless otherwise stated in HPI. Positive and negative findings pertinent to this evaluation are incorporated into the history of present illness.      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  Patient is a very pleasant 78-year-old gentleman on maintenance Keytruda for history of stage IV non-small cell lung cancer.  He has been on maintenance Keytruda for almost 5 years and based on recent imaging it has helped keep his disease very well-managed without any new evidence of recurrent or metastatic disease.  Patient wishes to continue with current regimen as he feels well and reports minimal side effects.  His blood work remains within normal range.  Continue on his current regimen without change.  He will return for a follow-up visit in 9 weeks.  We will continue to monitor his blood work every 3 weeks prior to scheduled dose of Keytruda.  He is aware to call anytime  with questions or concerns.    I spent 30 minutes on chart review, face to face counseling time, coordination of care and documentation.    Past Medical History:   has a past medical history of DVT of deep femoral vein, left (HCC) (04/18/2023).    PAST SURGICAL HISTORY:   has a past surgical history that includes Elbow surgery (Left, 2004).    CURRENT MEDICATIONS  Current Outpatient Medications   Medication Sig Dispense Refill    apixaban (Eliquis) 5 mg Take 1 tablet (5 mg total) by mouth 2 (two) times a day 60 tablet 11    Calcium Carb-Cholecalciferol (CALCIUM 1000 + D PO) Take by mouth      Cholecalciferol (Vitamin D3) 1.25 MG (45220 UT) CAPS Take by mouth      Contour Next Test test strip USE 1 STRIP TO CHECK GLUCOSE ONCE DAILY 100 each 0    dorzolamide (TRUSOPT) 2 % ophthalmic solution       dorzolamide-timolol (COSOPT) 22.3-6.8 MG/ML ophthalmic solution INSTILL 1 DROP INTO EACH EYE TWICE DAILY      glipiZIDE (GLUCOTROL XL) 2.5 mg 24 hr tablet Take 1 tablet by mouth once daily 90 tablet 0    hydroCHLOROthiazide 25 mg tablet Take 1 tablet by mouth once daily 90 tablet 0    Lancet Devices (Microlet Next Lancing Device) MISC USE ONCE DAILY IN THE MORNING TO TEST BLOOD SUGAR 1 each 0    lisinopril (ZESTRIL) 20 mg tablet Take 1 tablet by mouth once daily 90 tablet 0    lovastatin (MEVACOR) 20 mg tablet Take 1 tablet by mouth once daily 90 tablet 0    methocarbamol (ROBAXIN) 500 mg tablet Take 1 tablet (500 mg total) by mouth 3 (three) times a day 20 tablet 0    Microlet Lancets MISC USE 1  TO CHECK GLUCOSE ONCE DAILY 100 each 0    mupirocin (BACTROBAN) 2 % ointment Apply topically 3 (three) times a day as needed (skin infection)      potassium chloride (K-DUR,KLOR-CON) 20 mEq tablet TAKE 1  BY MOUTH ONCE DAILY 90 tablet 0     No current facility-administered medications for this visit.     [unfilled]    SOCIAL HISTORY:   reports that he quit smoking about 18 years ago. His smoking use included cigarettes. He started  smoking about 67 years ago. He has a 50 pack-year smoking history. He has never used smokeless tobacco. He reports current alcohol use of about 21.0 standard drinks of alcohol per week. He reports that he does not use drugs.     FAMILY HISTORY:  family history includes Colon cancer in his father.     ALLERGIES:  is allergic to aspirin and ibuprofen.      Physical Exam:  Vital Signs:   Visit Vitals  Smoking Status Former     There is no height or weight on file to calculate BMI.  There is no height or weight on file to calculate BSA.    Physical Exam  Constitutional:       General: He is not in acute distress.     Appearance: He is well-developed. He is not diaphoretic.   HENT:      Head: Normocephalic and atraumatic.      Mouth/Throat:      Pharynx: No oropharyngeal exudate.   Eyes:      General: No scleral icterus.     Pupils: Pupils are equal, round, and reactive to light.   Cardiovascular:      Rate and Rhythm: Normal rate and regular rhythm.      Heart sounds: No murmur heard.  Pulmonary:      Effort: Pulmonary effort is normal. No respiratory distress.      Breath sounds: Normal breath sounds.   Abdominal:      General: Bowel sounds are normal. There is no distension.      Palpations: Abdomen is soft. There is no mass.      Tenderness: There is no abdominal tenderness.   Musculoskeletal:         General: Normal range of motion.      Cervical back: Normal range of motion and neck supple.   Lymphadenopathy:      Cervical: No cervical adenopathy.   Skin:     General: Skin is warm and dry.      Findings: Rash (mild rash on bilateral forearms.) present.   Neurological:      General: No focal deficit present.      Mental Status: He is alert and oriented to person, place, and time.   Psychiatric:         Mood and Affect: Mood normal.         Behavior: Behavior normal.         Thought Content: Thought content normal.         Judgment: Judgment normal.         Labs:  Lab Results   Component Value Date    WBC 3.77 (L)  03/11/2024    HGB 15.3 03/11/2024    HCT 45.7 03/11/2024    MCV 94 03/11/2024     03/11/2024     Lab Results   Component Value Date    SODIUM 142 03/11/2024    K 3.7 03/11/2024     03/11/2024    CO2 29 03/11/2024    AGAP 6 03/11/2024    BUN 21 03/11/2024    CREATININE 0.82 03/11/2024    GLUC 140 03/11/2024    GLUF 188 (H) 01/30/2023    CALCIUM 9.2 03/11/2024    AST 28 03/11/2024    ALT 28 03/11/2024    ALKPHOS 88 03/11/2024    TP 7.0 03/11/2024    TBILI 0.62 03/11/2024    EGFR 84 03/11/2024

## 2024-03-19 ENCOUNTER — OFFICE VISIT (OUTPATIENT)
Age: 79
End: 2024-03-19
Payer: MEDICARE

## 2024-03-19 ENCOUNTER — TELEPHONE (OUTPATIENT)
Age: 79
End: 2024-03-19

## 2024-03-19 VITALS
TEMPERATURE: 97.3 F | BODY MASS INDEX: 33.86 KG/M2 | HEIGHT: 72 IN | OXYGEN SATURATION: 98 % | DIASTOLIC BLOOD PRESSURE: 66 MMHG | WEIGHT: 250 LBS | RESPIRATION RATE: 16 BRPM | SYSTOLIC BLOOD PRESSURE: 121 MMHG | HEART RATE: 65 BPM

## 2024-03-19 DIAGNOSIS — C34.90 NSCLC METASTATIC TO BONE (HCC): Primary | ICD-10-CM

## 2024-03-19 DIAGNOSIS — C79.51 NSCLC METASTATIC TO BONE (HCC): Primary | ICD-10-CM

## 2024-03-19 PROCEDURE — 99214 OFFICE O/P EST MOD 30 MIN: CPT | Performed by: NURSE PRACTITIONER

## 2024-03-19 RX ORDER — SODIUM CHLORIDE 9 MG/ML
20 INJECTION, SOLUTION INTRAVENOUS ONCE
OUTPATIENT
Start: 2024-04-03

## 2024-04-01 ENCOUNTER — HOSPITAL ENCOUNTER (OUTPATIENT)
Dept: INFUSION CENTER | Facility: HOSPITAL | Age: 79
Discharge: HOME | End: 2024-04-01
Payer: MEDICARE

## 2024-04-01 DIAGNOSIS — C34.90 NSCLC METASTATIC TO BONE (HCC): Primary | ICD-10-CM

## 2024-04-01 DIAGNOSIS — C79.51 NSCLC METASTATIC TO BONE (HCC): Primary | ICD-10-CM

## 2024-04-01 DIAGNOSIS — E87.6 HYPOKALEMIA: ICD-10-CM

## 2024-04-01 DIAGNOSIS — E11.65 TYPE 2 DIABETES MELLITUS WITH HYPERGLYCEMIA, WITHOUT LONG-TERM CURRENT USE OF INSULIN (HCC): ICD-10-CM

## 2024-04-01 DIAGNOSIS — C34.90 NSCLC METASTATIC TO BONE (HCC): ICD-10-CM

## 2024-04-01 DIAGNOSIS — C79.51 NSCLC METASTATIC TO BONE (HCC): ICD-10-CM

## 2024-04-01 LAB
ALBUMIN SERPL BCP-MCNC: 3.7 G/DL (ref 3.5–5)
ALP SERPL-CCNC: 81 U/L (ref 34–104)
ALT SERPL W P-5'-P-CCNC: 29 U/L (ref 7–52)
ANION GAP SERPL CALCULATED.3IONS-SCNC: 6 MMOL/L (ref 4–13)
AST SERPL W P-5'-P-CCNC: 26 U/L (ref 13–39)
BASOPHILS # BLD AUTO: 0.03 THOUSANDS/ÂΜL (ref 0–0.1)
BASOPHILS NFR BLD AUTO: 1 % (ref 0–1)
BILIRUB SERPL-MCNC: 0.94 MG/DL (ref 0.2–1)
BUN SERPL-MCNC: 19 MG/DL (ref 5–25)
CALCIUM SERPL-MCNC: 9 MG/DL (ref 8.4–10.2)
CHLORIDE SERPL-SCNC: 107 MMOL/L (ref 96–108)
CO2 SERPL-SCNC: 29 MMOL/L (ref 21–32)
CREAT SERPL-MCNC: 0.87 MG/DL (ref 0.6–1.3)
EOSINOPHIL # BLD AUTO: 0.2 THOUSAND/ÂΜL (ref 0–0.61)
EOSINOPHIL NFR BLD AUTO: 5 % (ref 0–6)
ERYTHROCYTE [DISTWIDTH] IN BLOOD BY AUTOMATED COUNT: 14.5 % (ref 11.6–15.1)
GFR SERPL CREATININE-BSD FRML MDRD: 81 ML/MIN/1.73SQ M
GLUCOSE SERPL-MCNC: 129 MG/DL (ref 65–140)
HCT VFR BLD AUTO: 44.8 % (ref 36.5–49.3)
HGB BLD-MCNC: 14.9 G/DL (ref 12–17)
IMM GRANULOCYTES # BLD AUTO: 0.02 THOUSAND/UL (ref 0–0.2)
IMM GRANULOCYTES NFR BLD AUTO: 1 % (ref 0–2)
LYMPHOCYTES # BLD AUTO: 0.49 THOUSANDS/ÂΜL (ref 0.6–4.47)
LYMPHOCYTES NFR BLD AUTO: 13 % (ref 14–44)
MCH RBC QN AUTO: 31.5 PG (ref 26.8–34.3)
MCHC RBC AUTO-ENTMCNC: 33.3 G/DL (ref 31.4–37.4)
MCV RBC AUTO: 95 FL (ref 82–98)
MONOCYTES # BLD AUTO: 0.42 THOUSAND/ÂΜL (ref 0.17–1.22)
MONOCYTES NFR BLD AUTO: 11 % (ref 4–12)
NEUTROPHILS # BLD AUTO: 2.68 THOUSANDS/ÂΜL (ref 1.85–7.62)
NEUTS SEG NFR BLD AUTO: 69 % (ref 43–75)
NRBC BLD AUTO-RTO: 0 /100 WBCS
PLATELET # BLD AUTO: 150 THOUSANDS/UL (ref 149–390)
PMV BLD AUTO: 11.3 FL (ref 8.9–12.7)
POTASSIUM SERPL-SCNC: 3.6 MMOL/L (ref 3.5–5.3)
PROT SERPL-MCNC: 7 G/DL (ref 6.4–8.4)
RBC # BLD AUTO: 4.73 MILLION/UL (ref 3.88–5.62)
SODIUM SERPL-SCNC: 142 MMOL/L (ref 135–147)
T3FREE SERPL-MCNC: 3.29 PG/ML (ref 2.5–3.9)
TSH SERPL DL<=0.05 MIU/L-ACNC: 1.41 UIU/ML (ref 0.45–4.5)
WBC # BLD AUTO: 3.84 THOUSAND/UL (ref 4.31–10.16)

## 2024-04-01 PROCEDURE — 84443 ASSAY THYROID STIM HORMONE: CPT | Performed by: INTERNAL MEDICINE

## 2024-04-01 PROCEDURE — 84481 FREE ASSAY (FT-3): CPT | Performed by: INTERNAL MEDICINE

## 2024-04-01 PROCEDURE — 80053 COMPREHEN METABOLIC PANEL: CPT | Performed by: INTERNAL MEDICINE

## 2024-04-01 PROCEDURE — 85025 COMPLETE CBC W/AUTO DIFF WBC: CPT | Performed by: INTERNAL MEDICINE

## 2024-04-01 NOTE — PROGRESS NOTES
Patient in infusion center today for central line labs. Pt tolerated port access and deaccess with no adverse reactions. Pt then d/c'd home ambulatory with steady gait. Octavio Atkins  tolerated treatment well with no complications.      Octavio Atkins is aware of future appt on 4/3 at 0830 .     AVS printed and given to Octavio Atkins:  No (Declined by Octavio Atkins)

## 2024-04-02 DIAGNOSIS — E11.65 TYPE 2 DIABETES MELLITUS WITH HYPERGLYCEMIA, WITHOUT LONG-TERM CURRENT USE OF INSULIN (HCC): ICD-10-CM

## 2024-04-02 RX ORDER — LANCETS
EACH MISCELLANEOUS
Qty: 100 EACH | Refills: 0 | Status: SHIPPED | OUTPATIENT
Start: 2024-04-02

## 2024-04-02 RX ORDER — POTASSIUM CHLORIDE 20 MEQ/1
20 TABLET, EXTENDED RELEASE ORAL DAILY
Qty: 90 TABLET | Refills: 0 | Status: SHIPPED | OUTPATIENT
Start: 2024-04-02

## 2024-04-03 ENCOUNTER — HOSPITAL ENCOUNTER (OUTPATIENT)
Dept: INFUSION CENTER | Facility: HOSPITAL | Age: 79
Discharge: HOME | End: 2024-04-03
Attending: INTERNAL MEDICINE
Payer: MEDICARE

## 2024-04-03 VITALS
HEIGHT: 72 IN | HEART RATE: 66 BPM | SYSTOLIC BLOOD PRESSURE: 136 MMHG | BODY MASS INDEX: 33.91 KG/M2 | OXYGEN SATURATION: 96 % | DIASTOLIC BLOOD PRESSURE: 69 MMHG | TEMPERATURE: 96.6 F | RESPIRATION RATE: 16 BRPM

## 2024-04-03 DIAGNOSIS — C79.51 NSCLC METASTATIC TO BONE (HCC): Primary | ICD-10-CM

## 2024-04-03 DIAGNOSIS — C34.90 NSCLC METASTATIC TO BONE (HCC): Primary | ICD-10-CM

## 2024-04-03 PROCEDURE — 96413 CHEMO IV INFUSION 1 HR: CPT

## 2024-04-03 RX ORDER — SODIUM CHLORIDE 9 MG/ML
20 INJECTION, SOLUTION INTRAVENOUS ONCE
Status: COMPLETED | OUTPATIENT
Start: 2024-04-03 | End: 2024-04-03

## 2024-04-03 RX ADMIN — SODIUM CHLORIDE 200 MG: 9 INJECTION, SOLUTION INTRAVENOUS at 09:29

## 2024-04-03 RX ADMIN — SODIUM CHLORIDE 20 ML/HR: 9 INJECTION, SOLUTION INTRAVENOUS at 09:18

## 2024-04-19 RX ORDER — SODIUM CHLORIDE 9 MG/ML
20 INJECTION, SOLUTION INTRAVENOUS ONCE
OUTPATIENT
Start: 2024-06-26

## 2024-04-19 RX ORDER — SODIUM CHLORIDE 9 MG/ML
20 INJECTION, SOLUTION INTRAVENOUS ONCE
Status: CANCELLED | OUTPATIENT
Start: 2024-04-24

## 2024-04-19 RX ORDER — SODIUM CHLORIDE 9 MG/ML
20 INJECTION, SOLUTION INTRAVENOUS ONCE
OUTPATIENT
Start: 2024-06-05

## 2024-04-19 RX ORDER — SODIUM CHLORIDE 9 MG/ML
20 INJECTION, SOLUTION INTRAVENOUS ONCE
OUTPATIENT
Start: 2024-05-15

## 2024-04-22 ENCOUNTER — HOSPITAL ENCOUNTER (OUTPATIENT)
Dept: INFUSION CENTER | Facility: HOSPITAL | Age: 79
Discharge: HOME | End: 2024-04-22
Payer: MEDICARE

## 2024-04-22 DIAGNOSIS — C34.90 NSCLC METASTATIC TO BONE (HCC): Primary | ICD-10-CM

## 2024-04-22 DIAGNOSIS — C79.51 NSCLC METASTATIC TO BONE (HCC): Primary | ICD-10-CM

## 2024-04-22 LAB
ALBUMIN SERPL BCP-MCNC: 3.7 G/DL (ref 3.5–5)
ALP SERPL-CCNC: 97 U/L (ref 34–104)
ALT SERPL W P-5'-P-CCNC: 29 U/L (ref 7–52)
ANION GAP SERPL CALCULATED.3IONS-SCNC: 6 MMOL/L (ref 4–13)
AST SERPL W P-5'-P-CCNC: 27 U/L (ref 13–39)
BASOPHILS # BLD AUTO: 0.05 THOUSANDS/ÂΜL (ref 0–0.1)
BASOPHILS NFR BLD AUTO: 1 % (ref 0–1)
BILIRUB SERPL-MCNC: 0.82 MG/DL (ref 0.2–1)
BUN SERPL-MCNC: 18 MG/DL (ref 5–25)
CALCIUM SERPL-MCNC: 9 MG/DL (ref 8.4–10.2)
CHLORIDE SERPL-SCNC: 106 MMOL/L (ref 96–108)
CO2 SERPL-SCNC: 29 MMOL/L (ref 21–32)
CREAT SERPL-MCNC: 0.87 MG/DL (ref 0.6–1.3)
EOSINOPHIL # BLD AUTO: 0.18 THOUSAND/ÂΜL (ref 0–0.61)
EOSINOPHIL NFR BLD AUTO: 5 % (ref 0–6)
ERYTHROCYTE [DISTWIDTH] IN BLOOD BY AUTOMATED COUNT: 14.6 % (ref 11.6–15.1)
GFR SERPL CREATININE-BSD FRML MDRD: 81 ML/MIN/1.73SQ M
GLUCOSE SERPL-MCNC: 157 MG/DL (ref 65–140)
HCT VFR BLD AUTO: 46.6 % (ref 36.5–49.3)
HGB BLD-MCNC: 15.4 G/DL (ref 12–17)
IMM GRANULOCYTES # BLD AUTO: 0.03 THOUSAND/UL (ref 0–0.2)
IMM GRANULOCYTES NFR BLD AUTO: 1 % (ref 0–2)
LYMPHOCYTES # BLD AUTO: 0.49 THOUSANDS/ÂΜL (ref 0.6–4.47)
LYMPHOCYTES NFR BLD AUTO: 13 % (ref 14–44)
MCH RBC QN AUTO: 31.3 PG (ref 26.8–34.3)
MCHC RBC AUTO-ENTMCNC: 33 G/DL (ref 31.4–37.4)
MCV RBC AUTO: 95 FL (ref 82–98)
MONOCYTES # BLD AUTO: 0.43 THOUSAND/ÂΜL (ref 0.17–1.22)
MONOCYTES NFR BLD AUTO: 12 % (ref 4–12)
NEUTROPHILS # BLD AUTO: 2.49 THOUSANDS/ÂΜL (ref 1.85–7.62)
NEUTS SEG NFR BLD AUTO: 68 % (ref 43–75)
NRBC BLD AUTO-RTO: 0 /100 WBCS
PLATELET # BLD AUTO: 163 THOUSANDS/UL (ref 149–390)
PMV BLD AUTO: 11 FL (ref 8.9–12.7)
POTASSIUM SERPL-SCNC: 3.7 MMOL/L (ref 3.5–5.3)
PROT SERPL-MCNC: 7.2 G/DL (ref 6.4–8.4)
RBC # BLD AUTO: 4.92 MILLION/UL (ref 3.88–5.62)
SODIUM SERPL-SCNC: 141 MMOL/L (ref 135–147)
T3FREE SERPL-MCNC: 3.18 PG/ML (ref 2.5–3.9)
TSH SERPL DL<=0.05 MIU/L-ACNC: 2.15 UIU/ML (ref 0.45–4.5)
WBC # BLD AUTO: 3.67 THOUSAND/UL (ref 4.31–10.16)

## 2024-04-22 PROCEDURE — 84443 ASSAY THYROID STIM HORMONE: CPT | Performed by: INTERNAL MEDICINE

## 2024-04-22 PROCEDURE — 85025 COMPLETE CBC W/AUTO DIFF WBC: CPT | Performed by: INTERNAL MEDICINE

## 2024-04-22 PROCEDURE — 80053 COMPREHEN METABOLIC PANEL: CPT | Performed by: INTERNAL MEDICINE

## 2024-04-22 PROCEDURE — 84481 FREE ASSAY (FT-3): CPT | Performed by: INTERNAL MEDICINE

## 2024-04-22 NOTE — PROGRESS NOTES
Octavio Atkins  tolerated treatment well with no complications.      Octavio Atkins is aware of future appt on 04/24/2024 at 09:00 AM.     AVS printed and given to Octavio Atkins:  No (Declined by Octavio Atkins)

## 2024-04-24 ENCOUNTER — HOSPITAL ENCOUNTER (OUTPATIENT)
Dept: INFUSION CENTER | Facility: HOSPITAL | Age: 79
Discharge: HOME | End: 2024-04-24
Attending: INTERNAL MEDICINE
Payer: MEDICARE

## 2024-04-24 VITALS
OXYGEN SATURATION: 95 % | HEART RATE: 79 BPM | RESPIRATION RATE: 16 BRPM | SYSTOLIC BLOOD PRESSURE: 103 MMHG | TEMPERATURE: 98.1 F | DIASTOLIC BLOOD PRESSURE: 51 MMHG

## 2024-04-24 DIAGNOSIS — C79.51 NSCLC METASTATIC TO BONE (HCC): Primary | ICD-10-CM

## 2024-04-24 DIAGNOSIS — C34.90 NSCLC METASTATIC TO BONE (HCC): Primary | ICD-10-CM

## 2024-04-24 PROCEDURE — 96413 CHEMO IV INFUSION 1 HR: CPT

## 2024-04-24 RX ORDER — SODIUM CHLORIDE 9 MG/ML
20 INJECTION, SOLUTION INTRAVENOUS ONCE
Status: COMPLETED | OUTPATIENT
Start: 2024-04-24 | End: 2024-04-24

## 2024-04-24 RX ADMIN — SODIUM CHLORIDE 20 ML/HR: 9 INJECTION, SOLUTION INTRAVENOUS at 09:45

## 2024-04-24 RX ADMIN — SODIUM CHLORIDE 200 MG: 9 INJECTION, SOLUTION INTRAVENOUS at 09:39

## 2024-04-24 NOTE — PROGRESS NOTES
Octavio Atkins  tolerated treatment well with no complications.      Octavio Atkins is aware of future appt on 5/13 at 1000.     AVS printed and given to Octavio Atkins:   No (Declined by Octavio Atkins)

## 2024-04-26 ENCOUNTER — OFFICE VISIT (OUTPATIENT)
Dept: FAMILY MEDICINE CLINIC | Facility: HOSPITAL | Age: 79
End: 2024-04-26
Payer: MEDICARE

## 2024-04-26 VITALS
OXYGEN SATURATION: 97 % | DIASTOLIC BLOOD PRESSURE: 66 MMHG | HEIGHT: 72 IN | HEART RATE: 66 BPM | SYSTOLIC BLOOD PRESSURE: 116 MMHG | BODY MASS INDEX: 34.43 KG/M2 | TEMPERATURE: 98.1 F | WEIGHT: 254.2 LBS

## 2024-04-26 DIAGNOSIS — C79.51 NSCLC METASTATIC TO BONE (HCC): ICD-10-CM

## 2024-04-26 DIAGNOSIS — I10 ESSENTIAL HYPERTENSION: ICD-10-CM

## 2024-04-26 DIAGNOSIS — E11.65 TYPE 2 DIABETES MELLITUS WITH HYPERGLYCEMIA, WITHOUT LONG-TERM CURRENT USE OF INSULIN (HCC): Primary | ICD-10-CM

## 2024-04-26 DIAGNOSIS — Z00.00 MEDICARE ANNUAL WELLNESS VISIT, SUBSEQUENT: ICD-10-CM

## 2024-04-26 DIAGNOSIS — Z79.01 LONG TERM CURRENT USE OF ANTICOAGULANT: ICD-10-CM

## 2024-04-26 DIAGNOSIS — Z93.3 COLOSTOMY IN PLACE (HCC): ICD-10-CM

## 2024-04-26 DIAGNOSIS — C34.90 NSCLC METASTATIC TO BONE (HCC): ICD-10-CM

## 2024-04-26 DIAGNOSIS — E78.2 MIXED HYPERLIPIDEMIA: ICD-10-CM

## 2024-04-26 PROCEDURE — 99214 OFFICE O/P EST MOD 30 MIN: CPT | Performed by: FAMILY MEDICINE

## 2024-04-26 PROCEDURE — 82043 UR ALBUMIN QUANTITATIVE: CPT | Performed by: FAMILY MEDICINE

## 2024-04-26 PROCEDURE — G0439 PPPS, SUBSEQ VISIT: HCPCS | Performed by: FAMILY MEDICINE

## 2024-04-26 PROCEDURE — 82570 ASSAY OF URINE CREATININE: CPT | Performed by: FAMILY MEDICINE

## 2024-04-26 NOTE — PROGRESS NOTES
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 They screened negative with a PHQ-2 score of 0.      Preventive health issues were discussed with patient, and age appropriate screening tests were ordered as noted in patient's After Visit Summary.  Personalized health advice and appropriate referrals for health education or preventive services given if needed, as noted in patient's After Visit Summary.     History of Present Illness:     Patient presents for a Medicare Wellness Visit    AWV and follow up medical issues     Had resolution of cold symptoms  Currently feeling well  Good tolerance of Keytruda       Patient Care Team:  Davide Torres MD as PCP - General (Family Medicine)     Review of Systems:     Review of Systems   Constitutional: Negative.    HENT: Negative.     Cardiovascular: Negative.    Genitourinary: Negative.    Musculoskeletal: Negative.    All other systems reviewed and are negative.       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)    Essential hypertension    Hypokalemia    Mixed hyperlipidemia    Glaucoma of both eyes    Type 2 diabetes mellitus with hyperglycemia, without long-term current use of insulin (HCC)    Colostomy in place ([facility])    Dysequilibrium    Medicare annual wellness visit, subsequent     Unilateral edema of lower extremity    Fatty liver    Left thyroid nodule    Pulmonary emphysema (HCC)    Long term current use of anticoagulant      Past Medical and Surgical History:     Past Medical History:   Diagnosis Date    DVT of deep femoral vein, left (HCC) 2023     Past Surgical History:   Procedure Laterality Date    ELBOW SURGERY Left 2004    pinched nerve      Family History:     Family History   Problem Relation Age of Onset    Colon cancer Father       Social History:     Social History     Socioeconomic History    Marital status: /Civil Union     Spouse name: None    Number of children: None    Years of education: None    Highest education level: None   Occupational History    None   Tobacco Use    Smoking status: Former     Current packs/day: 0.00     Average packs/day: 1 pack/day for 50.0 years (50.0 ttl pk-yrs)     Types: Cigarettes     Start date:      Quit date:      Years since quittin.3    Smokeless tobacco: Never   Vaping Use    Vaping status: Never Used   Substance and Sexual Activity    Alcohol use: Yes     Alcohol/week: 21.0 standard drinks of alcohol     Types: 21 Glasses of wine per week     Comment: social drinker    Drug use: Never    Sexual activity: Not Currently     Partners: Female   Other Topics Concern    None   Social History Narrative    None     Social Determinants of Health     Financial Resource Strain: Low Risk  (2023)    Overall Financial Resource Strain (CARDIA)     Difficulty of Paying Living Expenses: Not very hard   Food Insecurity: No Food Insecurity (2024)    Hunger Vital Sign     Worried About Running Out of Food in the Last Year: Never true     Ran Out of Food in the Last Year: Never true   Transportation Needs: No Transportation Needs (2024)    PRAPARE - Transportation     Lack of Transportation (Medical): No     Lack of Transportation (Non-Medical): No   Physical Activity: Not on file   Stress: Not on file   Social  Connections: Not on file   Intimate Partner Violence: Not on file   Housing Stability: Low Risk  (4/26/2024)    Housing Stability Vital Sign     Unable to Pay for Housing in the Last Year: No     Number of Places Lived in the Last Year: 1     Unstable Housing in the Last Year: No      Medications and Allergies:     Current Outpatient Medications   Medication Sig Dispense Refill    apixaban (Eliquis) 5 mg Take 1 tablet (5 mg total) by mouth 2 (two) times a day 60 tablet 11    Calcium Carb-Cholecalciferol (CALCIUM 1000 + D PO) Take by mouth      Cholecalciferol (Vitamin D3) 1.25 MG (93218 UT) CAPS Take by mouth      Contour Next Test test strip USE 1 STRIP TO CHECK GLUCOSE ONCE DAILY 100 each 0    dorzolamide (TRUSOPT) 2 % ophthalmic solution       dorzolamide-timolol (COSOPT) 22.3-6.8 MG/ML ophthalmic solution INSTILL 1 DROP INTO EACH EYE TWICE DAILY      glipiZIDE (GLUCOTROL XL) 2.5 mg 24 hr tablet Take 1 tablet by mouth once daily 90 tablet 0    hydroCHLOROthiazide 25 mg tablet Take 1 tablet by mouth once daily 90 tablet 0    Lancet Devices (Microlet Next Lancing Device) MISC USE ONCE DAILY IN THE MORNING TO TEST BLOOD SUGAR 1 each 0    lisinopril (ZESTRIL) 20 mg tablet Take 1 tablet by mouth once daily 90 tablet 0    lovastatin (MEVACOR) 20 mg tablet Take 1 tablet by mouth once daily 90 tablet 0    methocarbamol (ROBAXIN) 500 mg tablet Take 1 tablet (500 mg total) by mouth 3 (three) times a day 20 tablet 0    Microlet Lancets MISC USE 1 NEW LANCET TO CHECK GLUCOSE ONCE DAILY 100 each 0    mupirocin (BACTROBAN) 2 % ointment Apply topically 3 (three) times a day as needed (skin infection)      potassium chloride (Klor-Con M20) 20 mEq tablet Take 1 tablet by mouth once daily 90 tablet 0     No current facility-administered medications for this visit.     Allergies   Allergen Reactions    Aspirin Hives    Ibuprofen Hives      Immunizations:     Immunization History   Administered Date(s) Administered    COVID-19  MODERNA VACC 0.5 ML IM 01/30/2021, 02/27/2021, 01/04/2022    Influenza, high dose seasonal 0.7 mL 10/17/2022, 12/22/2023    Zoster 05/08/2023, 07/14/2023      Health Maintenance:     There are no preventive care reminders to display for this patient.      Topic Date Due    Pneumococcal Vaccine: 65+ Years (1 of 2 - PCV) Never done    COVID-19 Vaccine (4 - 2023-24 season) 09/01/2023      Medicare Screening Tests and Risk Assessments:     Octavio is here for his Subsequent Wellness visit. Last Medicare Wellness visit information reviewed, patient interviewed and updates made to the record today.      Health Risk Assessment:   Patient rates overall health as good. Patient feels that their physical health rating is same. Patient is very satisfied with their life. Eyesight was rated as same. Hearing was rated as same. Patient feels that their emotional and mental health rating is same. Patients states they are never, rarely angry. Patient states they are sometimes unusually tired/fatigued. Pain experienced in the last 7 days has been none. Patient states that he has experienced no weight loss or gain in last 6 months.     Depression Screening:   PHQ-2 Score: 0      Fall Risk Screening:   In the past year, patient has experienced: no history of falling in past year      Home Safety:  Patient does not have trouble with stairs inside or outside of their home. Patient has working smoke alarms and has working carbon monoxide detector. Home safety hazards include: none.     Nutrition:   Current diet is Regular.     Medications:   Patient is not currently taking any over-the-counter supplements. Patient is able to manage medications.     Activities of Daily Living (ADLs)/Instrumental Activities of Daily Living (IADLs):   Walk and transfer into and out of bed and chair?: Yes  Dress and groom yourself?: Yes    Bathe or shower yourself?: Yes    Feed yourself? Yes  Do your laundry/housekeeping?: Yes  Manage your money, pay your bills  and track your expenses?: Yes  Make your own meals?: Yes    Do your own shopping?: Yes    Previous Hospitalizations:   Any hospitalizations or ED visits within the last 12 months?: No      Advance Care Planning:   Living will: No    Durable POA for healthcare: No    Advanced directive: No    Advanced directive counseling given: Yes    Five wishes given: No    Patient declined ACP directive: No    End of Life Decisions reviewed with patient: Yes    Provider agrees with end of life decisions: Yes      Cognitive Screening:   Provider or family/friend/caregiver concerned regarding cognition?: No    PREVENTIVE SCREENINGS      Cardiovascular Screening:    General: Screening Not Indicated and History Lipid Disorder      Diabetes Screening:     General: Screening Not Indicated and History Diabetes      Colorectal Cancer Screening:     General: Screening Not Indicated      Prostate Cancer Screening:    General: Screening Not Indicated      Osteoporosis Screening:    General: Screening Not Indicated      Abdominal Aortic Aneurysm (AAA) Screening:    Risk factors include: tobacco use        Lung Cancer Screening:     General: Screening Not Indicated and History Lung Cancer      Hepatitis C Screening:    General: Screening Not Indicated    Screening, Brief Intervention, and Referral to Treatment (SBIRT)    Screening  Typical number of drinks in a day: 0  Typical number of drinks in a week: 0  Interpretation: Low risk drinking behavior.    Single Item Drug Screening:  How often have you used an illegal drug (including marijuana) or a prescription medication for non-medical reasons in the past year? never    Single Item Drug Screen Score: 0  Interpretation: Negative screen for possible drug use disorder    No results found.     Physical Exam:     /66 (BP Location: Left arm, Patient Position: Sitting, Cuff Size: Large)   Pulse 66   Temp 98.1 °F (36.7 °C) (Tympanic)   Ht 6' (1.829 m)   Wt 115 kg (254 lb 3.2 oz)   SpO2  97%   BMI 34.48 kg/m²     Physical Exam  Vitals and nursing note reviewed.   Constitutional:       Appearance: Normal appearance.   Neck:      Vascular: No carotid bruit.   Cardiovascular:      Rate and Rhythm: Normal rate and regular rhythm.      Heart sounds: Normal heart sounds.   Pulmonary:      Effort: Pulmonary effort is normal.      Breath sounds: Normal breath sounds.   Musculoskeletal:      Right lower leg: No edema.      Left lower leg: No edema.   Neurological:      Mental Status: He is alert and oriented to person, place, and time.          Davide Torres MD

## 2024-04-26 NOTE — PATIENT INSTRUCTIONS
Medicare Preventive Visit Patient Instructions  Thank you for completing your Welcome to Medicare Visit or Medicare Annual Wellness Visit today. Your next wellness visit will be due in one year (4/27/2025).  The screening/preventive services that you may require over the next 5-10 years are detailed below. Some tests may not apply to you based off risk factors and/or age. Screening tests ordered at today's visit but not completed yet may show as past due. Also, please note that scanned in results may not display below.  Preventive Screenings:  Service Recommendations Previous Testing/Comments   Colorectal Cancer Screening  Colonoscopy    Fecal Occult Blood Test (FOBT)/Fecal Immunochemical Test (FIT)  Fecal DNA/Cologuard Test  Flexible Sigmoidoscopy Age: 45-75 years old   Colonoscopy: every 10 years (May be performed more frequently if at higher risk)  OR  FOBT/FIT: every 1 year  OR  Cologuard: every 3 years  OR  Sigmoidoscopy: every 5 years  Screening may be recommended earlier than age 45 if at higher risk for colorectal cancer. Also, an individualized decision between you and your healthcare provider will decide whether screening between the ages of 76-85 would be appropriate. Colonoscopy: Not on file  FOBT/FIT: Not on file  Cologuard: Not on file  Sigmoidoscopy: Not on file          Prostate Cancer Screening Individualized decision between patient and health care provider in men between ages of 55-69   Medicare will cover every 12 months beginning on the day after your 50th birthday PSA: No results in last 5 years     Screening Not Indicated     Hepatitis C Screening Once for adults born between 1945 and 1965  More frequently in patients at high risk for Hepatitis C Hep C Antibody: Not on file        Diabetes Screening 1-2 times per year if you're at risk for diabetes or have pre-diabetes Fasting glucose: 188 mg/dL (1/30/2023)  A1C: 6.2 (12/22/2023)  Screening Not Indicated  History Diabetes   Cholesterol  Screening Once every 5 years if you don't have a lipid disorder. May order more often based on risk factors. Lipid panel: 08/28/2023  Screening Not Indicated  History Lipid Disorder      Other Preventive Screenings Covered by Medicare:  Abdominal Aortic Aneurysm (AAA) Screening: covered once if your at risk. You're considered to be at risk if you have a family history of AAA or a male between the age of 65-75 who smoking at least 100 cigarettes in your lifetime.  Lung Cancer Screening: covers low dose CT scan once per year if you meet all of the following conditions: (1) Age 55-77; (2) No signs or symptoms of lung cancer; (3) Current smoker or have quit smoking within the last 15 years; (4) You have a tobacco smoking history of at least 20 pack years (packs per day x number of years you smoked); (5) You get a written order from a healthcare provider.  Glaucoma Screening: covered annually if you're considered high risk: (1) You have diabetes OR (2) Family history of glaucoma OR (3)  aged 50 and older OR (4)  American aged 65 and older  Osteoporosis Screening: covered every 2 years if you meet one of the following conditions: (1) Have a vertebral abnormality; (2) On glucocorticoid therapy for more than 3 months; (3) Have primary hyperparathyroidism; (4) On osteoporosis medications and need to assess response to drug therapy.  HIV Screening: covered annually if you're between the age of 15-65. Also covered annually if you are younger than 15 and older than 65 with risk factors for HIV infection. For pregnant patients, it is covered up to 3 times per pregnancy.    Immunizations:  Immunization Recommendations   Influenza Vaccine Annual influenza vaccination during flu season is recommended for all persons aged >= 6 months who do not have contraindications   Pneumococcal Vaccine   * Pneumococcal conjugate vaccine = PCV13 (Prevnar 13), PCV15 (Vaxneuvance), PCV20 (Prevnar 20)  * Pneumococcal  polysaccharide vaccine = PPSV23 (Pneumovax) Adults 19-63 yo with certain risk factors or if 65+ yo  If never received any pneumonia vaccine: recommend Prevnar 20 (PCV20)  Give PCV20 if previously received 1 dose of PCV13 or PPSV23   Hepatitis B Vaccine 3 dose series if at intermediate or high risk (ex: diabetes, end stage renal disease, liver disease)   Respiratory syncytial virus (RSV) Vaccine - COVERED BY MEDICARE PART D  * RSVPreF3 (Arexvy) CDC recommends that adults 60 years of age and older may receive a single dose of RSV vaccine using shared clinical decision-making (SCDM)   Tetanus (Td) Vaccine - COST NOT COVERED BY MEDICARE PART B Following completion of primary series, a booster dose should be given every 10 years to maintain immunity against tetanus. Td may also be given as tetanus wound prophylaxis.   Tdap Vaccine - COST NOT COVERED BY MEDICARE PART B Recommended at least once for all adults. For pregnant patients, recommended with each pregnancy.   Shingles Vaccine (Shingrix) - COST NOT COVERED BY MEDICARE PART B  2 shot series recommended in those 19 years and older who have or will have weakened immune systems or those 50 years and older     Health Maintenance Due:  There are no preventive care reminders to display for this patient.  Immunizations Due:      Topic Date Due   • Pneumococcal Vaccine: 65+ Years (1 of 2 - PCV) Never done   • COVID-19 Vaccine (4 - 2023-24 season) 09/01/2023     Advance Directives   What are advance directives?  Advance directives are legal documents that state your wishes and plans for medical care. These plans are made ahead of time in case you lose your ability to make decisions for yourself. Advance directives can apply to any medical decision, such as the treatments you want, and if you want to donate organs.   What are the types of advance directives?  There are many types of advance directives, and each state has rules about how to use them. You may choose a  combination of any of the following:  Living will:  This is a written record of the treatment you want. You can also choose which treatments you do not want, which to limit, and which to stop at a certain time. This includes surgery, medicine, IV fluid, and tube feedings.   Durable power of  for healthcare (DPAHC):  This is a written record that states who you want to make healthcare choices for you when you are unable to make them for yourself. This person, called a proxy, is usually a family member or a friend. You may choose more than 1 proxy.  Do not resuscitate (DNR) order:  A DNR order is used in case your heart stops beating or you stop breathing. It is a request not to have certain forms of treatment, such as CPR. A DNR order may be included in other types of advance directives.  Medical directive:  This covers the care that you want if you are in a coma, near death, or unable to make decisions for yourself. You can list the treatments you want for each condition. Treatment may include pain medicine, surgery, blood transfusions, dialysis, IV or tube feedings, and a ventilator (breathing machine).  Values history:  This document has questions about your views, beliefs, and how you feel and think about life. This information can help others choose the care that you would choose.  Why are advance directives important?  An advance directive helps you control your care. Although spoken wishes may be used, it is better to have your wishes written down. Spoken wishes can be misunderstood, or not followed. Treatments may be given even if you do not want them. An advance directive may make it easier for your family to make difficult choices about your care.   Weight Management   Why it is important to manage your weight:  Being overweight increases your risk of health conditions such as heart disease, high blood pressure, type 2 diabetes, and certain types of cancer. It can also increase your risk for  osteoarthritis, sleep apnea, and other respiratory problems. Aim for a slow, steady weight loss. Even a small amount of weight loss can lower your risk of health problems.  How to lose weight safely:  A safe and healthy way to lose weight is to eat fewer calories and get regular exercise. You can lose up about 1 pound a week by decreasing the number of calories you eat by 500 calories each day.   Healthy meal plan for weight management:  A healthy meal plan includes a variety of foods, contains fewer calories, and helps you stay healthy. A healthy meal plan includes the following:  Eat whole-grain foods more often.  A healthy meal plan should contain fiber. Fiber is the part of grains, fruits, and vegetables that is not broken down by your body. Whole-grain foods are healthy and provide extra fiber in your diet. Some examples of whole-grain foods are whole-wheat breads and pastas, oatmeal, brown rice, and bulgur.  Eat a variety of vegetables every day.  Include dark, leafy greens such as spinach, kale, ortiz greens, and mustard greens. Eat yellow and orange vegetables such as carrots, sweet potatoes, and winter squash.   Eat a variety of fruits every day.  Choose fresh or canned fruit (canned in its own juice or light syrup) instead of juice. Fruit juice has very little or no fiber.  Eat low-fat dairy foods.  Drink fat-free (skim) milk or 1% milk. Eat fat-free yogurt and low-fat cottage cheese. Try low-fat cheeses such as mozzarella and other reduced-fat cheeses.  Choose meat and other protein foods that are low in fat.  Choose beans or other legumes such as split peas or lentils. Choose fish, skinless poultry (chicken or turkey), or lean cuts of red meat (beef or pork). Before you cook meat or poultry, cut off any visible fat.   Use less fat and oil.  Try baking foods instead of frying them. Add less fat, such as margarine, sour cream, regular salad dressing and mayonnaise to foods. Eat fewer high-fat foods. Some  examples of high-fat foods include french fries, doughnuts, ice cream, and cakes.  Eat fewer sweets.  Limit foods and drinks that are high in sugar. This includes candy, cookies, regular soda, and sweetened drinks.  Exercise:  Exercise at least 30 minutes per day on most days of the week. Some examples of exercise include walking, biking, dancing, and swimming. You can also fit in more physical activity by taking the stairs instead of the elevator or parking farther away from stores. Ask your healthcare provider about the best exercise plan for you.      © Copyright [employer] 2018 Information is for End User's use only and may not be sold, redistributed or otherwise used for commercial purposes. All illustrations and images included in CareNotes® are the copyrighted property of A.D.A.M., Inc. or [employer]

## 2024-04-27 LAB
CREAT UR-MCNC: 105.4 MG/DL
MICROALBUMIN UR-MCNC: 13.1 MG/L
MICROALBUMIN/CREAT 24H UR: 12 MG/G CREATININE (ref 0–30)

## 2024-04-28 DIAGNOSIS — I10 ESSENTIAL HYPERTENSION: ICD-10-CM

## 2024-04-28 RX ORDER — HYDROCHLOROTHIAZIDE 25 MG/1
25 TABLET ORAL DAILY
Qty: 90 TABLET | Refills: 1 | Status: SHIPPED | OUTPATIENT
Start: 2024-04-28

## 2024-04-28 RX ORDER — LISINOPRIL 20 MG/1
20 TABLET ORAL DAILY
Qty: 90 TABLET | Refills: 1 | Status: SHIPPED | OUTPATIENT
Start: 2024-04-28

## 2024-04-29 ENCOUNTER — TELEPHONE (OUTPATIENT)
Dept: ADMINISTRATIVE | Facility: OTHER | Age: 79
End: 2024-04-29

## 2024-04-29 NOTE — TELEPHONE ENCOUNTER
----- Message from Cassie Mccartney sent at 4/26/2024  2:46 PM EDT -----  Regarding: Dm eye, [facility]  04/26/24 2:46 PM    Hello, our patient Octavio Atkins has had Diabetic Eye Exam completed/performed. Please assist in updating the patient chart by making an External outreach to Dr Buchanan facility located in [place]. The date of service is within the last month..    Thank you,  Cassie Mccartney  [facility]

## 2024-04-30 NOTE — TELEPHONE ENCOUNTER
Upon review of the In Basket request we were able to locate, review, and update the patient chart as requested for Diabetic Eye Exam.    Any additional questions or concerns should be emailed to the Practice Liaisons via the appropriate education email address, please do not reply via In Basket.    Thank you  Joie Tapia

## 2024-05-02 DIAGNOSIS — E11.65 TYPE 2 DIABETES MELLITUS WITH HYPERGLYCEMIA, WITHOUT LONG-TERM CURRENT USE OF INSULIN (HCC): ICD-10-CM

## 2024-05-02 DIAGNOSIS — E78.2 MIXED HYPERLIPIDEMIA: ICD-10-CM

## 2024-05-04 RX ORDER — LOVASTATIN 20 MG/1
TABLET ORAL
Qty: 90 TABLET | Refills: 0 | Status: SHIPPED | OUTPATIENT
Start: 2024-05-04

## 2024-05-04 RX ORDER — GLIPIZIDE 2.5 MG/1
TABLET, EXTENDED RELEASE ORAL
Qty: 90 TABLET | Refills: 0 | Status: SHIPPED | OUTPATIENT
Start: 2024-05-04

## 2024-05-13 ENCOUNTER — HOSPITAL ENCOUNTER (OUTPATIENT)
Dept: INFUSION CENTER | Facility: HOSPITAL | Age: 79
Discharge: HOME | End: 2024-05-13
Payer: MEDICARE

## 2024-05-13 DIAGNOSIS — C79.51 NSCLC METASTATIC TO BONE (HCC): Primary | ICD-10-CM

## 2024-05-13 DIAGNOSIS — C34.90 NSCLC METASTATIC TO BONE (HCC): Primary | ICD-10-CM

## 2024-05-13 LAB
ALBUMIN SERPL BCP-MCNC: 3.7 G/DL (ref 3.5–5)
ALP SERPL-CCNC: 93 U/L (ref 34–104)
ALT SERPL W P-5'-P-CCNC: 26 U/L (ref 7–52)
ANION GAP SERPL CALCULATED.3IONS-SCNC: 8 MMOL/L (ref 4–13)
AST SERPL W P-5'-P-CCNC: 26 U/L (ref 13–39)
BASOPHILS # BLD AUTO: 0.05 THOUSANDS/ÂΜL (ref 0–0.1)
BASOPHILS NFR BLD AUTO: 2 % (ref 0–1)
BILIRUB SERPL-MCNC: 0.84 MG/DL (ref 0.2–1)
BUN SERPL-MCNC: 20 MG/DL (ref 5–25)
CALCIUM SERPL-MCNC: 9.1 MG/DL (ref 8.4–10.2)
CHLORIDE SERPL-SCNC: 105 MMOL/L (ref 96–108)
CO2 SERPL-SCNC: 28 MMOL/L (ref 21–32)
CREAT SERPL-MCNC: 1 MG/DL (ref 0.6–1.3)
EOSINOPHIL # BLD AUTO: 0.15 THOUSAND/ÂΜL (ref 0–0.61)
EOSINOPHIL NFR BLD AUTO: 5 % (ref 0–6)
ERYTHROCYTE [DISTWIDTH] IN BLOOD BY AUTOMATED COUNT: 14.3 % (ref 11.6–15.1)
GFR SERPL CREATININE-BSD FRML MDRD: 70 ML/MIN/1.73SQ M
GLUCOSE SERPL-MCNC: 227 MG/DL (ref 65–140)
HCT VFR BLD AUTO: 45.6 % (ref 36.5–49.3)
HGB BLD-MCNC: 15 G/DL (ref 12–17)
IMM GRANULOCYTES # BLD AUTO: 0.02 THOUSAND/UL (ref 0–0.2)
IMM GRANULOCYTES NFR BLD AUTO: 1 % (ref 0–2)
LYMPHOCYTES # BLD AUTO: 0.41 THOUSANDS/ÂΜL (ref 0.6–4.47)
LYMPHOCYTES NFR BLD AUTO: 12 % (ref 14–44)
MCH RBC QN AUTO: 31.1 PG (ref 26.8–34.3)
MCHC RBC AUTO-ENTMCNC: 32.9 G/DL (ref 31.4–37.4)
MCV RBC AUTO: 95 FL (ref 82–98)
MONOCYTES # BLD AUTO: 0.23 THOUSAND/ÂΜL (ref 0.17–1.22)
MONOCYTES NFR BLD AUTO: 7 % (ref 4–12)
NEUTROPHILS # BLD AUTO: 2.49 THOUSANDS/ÂΜL (ref 1.85–7.62)
NEUTS SEG NFR BLD AUTO: 73 % (ref 43–75)
NRBC BLD AUTO-RTO: 0 /100 WBCS
PLATELET # BLD AUTO: 161 THOUSANDS/UL (ref 149–390)
PMV BLD AUTO: 11.4 FL (ref 8.9–12.7)
POTASSIUM SERPL-SCNC: 3.6 MMOL/L (ref 3.5–5.3)
PROT SERPL-MCNC: 6.9 G/DL (ref 6.4–8.4)
RBC # BLD AUTO: 4.82 MILLION/UL (ref 3.88–5.62)
SODIUM SERPL-SCNC: 141 MMOL/L (ref 135–147)
T3FREE SERPL-MCNC: 3.4 PG/ML (ref 2.5–3.9)
TSH SERPL DL<=0.05 MIU/L-ACNC: 1.43 UIU/ML (ref 0.45–4.5)
WBC # BLD AUTO: 3.35 THOUSAND/UL (ref 4.31–10.16)

## 2024-05-13 PROCEDURE — 85025 COMPLETE CBC W/AUTO DIFF WBC: CPT | Performed by: INTERNAL MEDICINE

## 2024-05-13 PROCEDURE — 84443 ASSAY THYROID STIM HORMONE: CPT | Performed by: INTERNAL MEDICINE

## 2024-05-13 PROCEDURE — 84481 FREE ASSAY (FT-3): CPT | Performed by: INTERNAL MEDICINE

## 2024-05-13 PROCEDURE — 80053 COMPREHEN METABOLIC PANEL: CPT | Performed by: INTERNAL MEDICINE

## 2024-05-13 NOTE — PROGRESS NOTES
Pt presents for lab work. Port flushed with blood return noted. Blood work drawn and sent to lab. Saline locked and de accessed. Pt aware of future appt on 5/15/24 at 830 am. AVS declined.

## 2024-05-15 ENCOUNTER — HOSPITAL ENCOUNTER (OUTPATIENT)
Dept: INFUSION CENTER | Facility: HOSPITAL | Age: 79
Discharge: HOME | End: 2024-05-15
Attending: INTERNAL MEDICINE
Payer: MEDICARE

## 2024-05-15 VITALS
HEART RATE: 80 BPM | HEIGHT: 72 IN | BODY MASS INDEX: 34.1 KG/M2 | WEIGHT: 251.77 LBS | RESPIRATION RATE: 18 BRPM | SYSTOLIC BLOOD PRESSURE: 149 MMHG | DIASTOLIC BLOOD PRESSURE: 79 MMHG | OXYGEN SATURATION: 93 % | TEMPERATURE: 97.7 F

## 2024-05-15 DIAGNOSIS — C79.51 NSCLC METASTATIC TO BONE (HCC): Primary | ICD-10-CM

## 2024-05-15 DIAGNOSIS — C34.90 NSCLC METASTATIC TO BONE (HCC): Primary | ICD-10-CM

## 2024-05-15 PROCEDURE — 96413 CHEMO IV INFUSION 1 HR: CPT

## 2024-05-15 RX ORDER — SODIUM CHLORIDE 9 MG/ML
20 INJECTION, SOLUTION INTRAVENOUS ONCE
Status: COMPLETED | OUTPATIENT
Start: 2024-05-15 | End: 2024-05-15

## 2024-05-15 RX ADMIN — SODIUM CHLORIDE 20 ML/HR: 0.9 INJECTION, SOLUTION INTRAVENOUS at 09:07

## 2024-05-15 RX ADMIN — SODIUM CHLORIDE 200 MG: 9 INJECTION, SOLUTION INTRAVENOUS at 09:08

## 2024-05-15 NOTE — PROGRESS NOTES
Octavio Atkins  tolerated treatment well with no complications.      Octavio Atkins is aware of future appt on 06/03/2024 at 09:00 AM.     AVS printed and given to Octavio Atkins:  No (Declined by Octavio Atkins)

## 2024-05-15 NOTE — PROGRESS NOTES
Pt tolerated keytruda infusion without any adverse side effects. Port checked for blood return, flushed and de-accessed. Pt ambulated with a steady gait off of unit. Declined AVS     Aware of next appt 06/03/24 @ 0900 am

## 2024-05-17 NOTE — PROGRESS NOTES
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)   3/1/2018 -  Cancer Staged    Staging form: Lung, AJCC 8th Edition  - Clinical stage from 3/1/2018: Stage IVB (cTX, cN2, cM1c) - Signed by Ally Lee DO on 2022 Initial Diagnosis    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)     2022 - 2022 Chemotherapy    pembrolizumab (KEYTRUDA) IVPB, 200 mg, Intravenous, Once, 10 of 13 cycles  Administration: 200 mg (2022), 200 mg (6/15/2022), 200 mg (2022), 200 mg (2022), 200 mg (2022), 200 mg (2022), 200 mg (2022), 200 mg (10/19/2022), 200 mg (2022), 200 mg (2022)     2022 - 2022 Chemotherapy    pembrolizumab (KEYTRUDA) IVPB, 400 mg, Intravenous, Once, 1 of 6 cycles     2022 -  Chemotherapy    alteplase (CATHFLO), 2 mg, Intracatheter, Every 2 hour PRN, 24 of 26 cycles  pembrolizumab (KEYTRUDA) IVPB, 200 mg, Intravenous, Once, 24 of 26 cycles  Administration: 200 mg (2022), 200 mg (2023), 200 mg (2023), 200 mg (2023), 200 mg (3/15/2023), 200 mg (2023), 200 mg (2023), 200 mg (2023), 200 mg (2023), 200 mg (2023), 200 mg (2023), 200 mg (2023), 200 mg (2023), 200 mg (2023), 200 mg (10/11/2023), 200 mg (2023), 200 mg (2023), 200 mg (2023), 200 mg (1/3/2024), 200 mg (2024), 200 mg (3/14/2024), 200 mg (4/3/2024), 200 mg (2024), 200 mg (5/15/2024)         Interval History: Patient  returns for follow-up visit.  He continues to receive Keytruda every 3 weeks and continues to tolerate it quite well.  His labs have remained in acceptable range. Thyroid normal. He has mild rash on his forearms. Denies any cough, SOB or hemoptysis. No diarrhea or significant fatigue       REVIEW OF SYSTEMS:  Please note that a 14-point review of systems was performed to include Constitutional, HEENT, Respiratory, CVS, GI, , Musculoskeletal, Integumentary, Neurologic, Rheumatologic, Endocrinologic, Psychiatric, Lymphatic, and Hematologic/Oncologic systems were reviewed and are negative unless otherwise stated in HPI. Positive and negative findings pertinent to this evaluation are incorporated into the history of present illness.      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  He has been on maintenance Keytruda for almost 5 years and based on recent imaging it has helped keep his disease very well-managed without any new evidence of recurrent or metastatic disease.  Patient wishes to continue with current regimen as he feels well and reports minimal side effects.  His blood work remains within normal range.  He will continue on his current regimen without change.  He will return for a follow-up visit in 9 weeks with Dr Lee.  We will continue to  monitor his blood work every 3 weeks prior to scheduled dose of Keytruda.  He is aware to call anytime with questions or concerns.    I spent 30 minutes on chart review, face to face counseling time, coordination of care and documentation.    Past Medical History:   has a past medical history of DVT of deep femoral vein, left (HCC) (04/18/2023).    PAST SURGICAL HISTORY:   has a past surgical history that includes Elbow surgery (Left, 2004).    CURRENT MEDICATIONS  Current Outpatient Medications   Medication Sig Dispense Refill    apixaban (Eliquis) 5 mg Take 1 tablet (5 mg total) by mouth 2 (two) times a day 60 tablet 11    Calcium Carb-Cholecalciferol (CALCIUM 1000 + D PO) Take by mouth      Cholecalciferol (Vitamin D3) 1.25 MG (28850 UT) CAPS Take by mouth      Contour Next Test test strip USE 1 STRIP TO CHECK GLUCOSE ONCE DAILY 100 each 0    dorzolamide (TRUSOPT) 2 % ophthalmic solution       dorzolamide-timolol (COSOPT) 22.3-6.8 MG/ML ophthalmic solution INSTILL 1 DROP INTO EACH EYE TWICE DAILY      glipiZIDE (GLUCOTROL XL) 2.5 mg 24 hr tablet Take 1 tablet by mouth once daily 90 tablet 0    hydroCHLOROthiazide 25 mg tablet Take 1 tablet by mouth once daily 90 tablet 1    Lancet Devices (Microlet Next Lancing Device) MISC USE ONCE DAILY IN THE MORNING TO TEST BLOOD SUGAR 1 each 0    lisinopril (ZESTRIL) 20 mg tablet Take 1 tablet by mouth once daily 90 tablet 1    lovastatin (MEVACOR) 20 mg tablet Take 1 tablet by mouth once daily 90 tablet 0    methocarbamol (ROBAXIN) 500 mg tablet Take 1 tablet (500 mg total) by mouth 3 (three) times a day 20 tablet 0    Microlet Lancets [facility] USE 1 NEW LANCET TO CHECK GLUCOSE ONCE DAILY 100 each 0    mupirocin (BACTROBAN) 2 % ointment Apply topically 3 (three) times a day as needed (skin infection)      potassium chloride (Klor-Con M20) 20 mEq tablet Take 1 tablet by mouth once daily 90 tablet 0     No current facility-administered medications for this visit.      [unfilled]    SOCIAL HISTORY:   reports that he quit smoking about 18 years ago. His smoking use included cigarettes. He started smoking about 67 years ago. He has a 50 pack-year smoking history. He has never used smokeless tobacco. He reports current alcohol use of about 21.0 standard drinks of alcohol per week. He reports that he does not use drugs.     FAMILY HISTORY:  family history includes Colon cancer in his father.     ALLERGIES:  is allergic to aspirin and ibuprofen.      Physical Exam:  Vital Signs:   Visit Vitals  /68 (BP Location: Left arm, Patient Position: Sitting, Cuff Size: Standard)   Pulse 68   Temp (!) 97.2 °F (36.2 °C) (Temporal)   Resp 18   Ht 6' (1.829 m)   Wt 115 kg (253 lb)   SpO2 95%   BMI 34.31 kg/m²   Smoking Status Former   BSA 2.36 m²     Body mass index is 34.31 kg/m².  Body surface area is 2.36 meters squared.    Physical Exam  Constitutional:       General: He is not in acute distress.     Appearance: Normal appearance.   HENT:      Head: Normocephalic and atraumatic.   Eyes:      General: No scleral icterus.        Right eye: No discharge.         Left eye: No discharge.      Conjunctiva/sclera: Conjunctivae normal.   Cardiovascular:      Rate and Rhythm: Normal rate and regular rhythm.   Pulmonary:      Effort: Pulmonary effort is normal. No respiratory distress.      Breath sounds: Normal breath sounds.   Abdominal:      General: Bowel sounds are normal. There is no distension.      Palpations: Abdomen is soft. There is no mass.      Tenderness: There is no abdominal tenderness.   Musculoskeletal:         General: Normal range of motion.   Lymphadenopathy:      Cervical: No cervical adenopathy.      Upper Body:      Right upper body: No supraclavicular, axillary or pectoral adenopathy.      Left upper body: No supraclavicular, axillary or pectoral adenopathy.   Skin:     General: Skin is warm and dry.   Neurological:      General: No focal deficit present.      Mental  Status: He is alert and oriented to person, place, and time.   Psychiatric:         Mood and Affect: Mood normal.         Behavior: Behavior normal.         Labs:  Lab Results   Component Value Date    WBC 3.35 (L) 05/13/2024    HGB 15.0 05/13/2024    HCT 45.6 05/13/2024    MCV 95 05/13/2024     05/13/2024     Lab Results   Component Value Date    SODIUM 141 05/13/2024    K 3.6 05/13/2024     05/13/2024    CO2 28 05/13/2024    AGAP 8 05/13/2024    BUN 20 05/13/2024    CREATININE 1.00 05/13/2024    GLUC 227 (H) 05/13/2024    GLUF 188 (H) 01/30/2023    CALCIUM 9.1 05/13/2024    AST 26 05/13/2024    ALT 26 05/13/2024    ALKPHOS 93 05/13/2024    TP 6.9 05/13/2024    TBILI 0.84 05/13/2024    EGFR 70 05/13/2024

## 2024-05-21 ENCOUNTER — TELEPHONE (OUTPATIENT)
Age: 79
End: 2024-05-21

## 2024-05-21 ENCOUNTER — OFFICE VISIT (OUTPATIENT)
Age: 79
End: 2024-05-21
Payer: MEDICARE

## 2024-05-21 VITALS
SYSTOLIC BLOOD PRESSURE: 140 MMHG | TEMPERATURE: 97.2 F | RESPIRATION RATE: 18 BRPM | HEART RATE: 68 BPM | OXYGEN SATURATION: 95 % | HEIGHT: 72 IN | BODY MASS INDEX: 34.27 KG/M2 | DIASTOLIC BLOOD PRESSURE: 68 MMHG | WEIGHT: 253 LBS

## 2024-05-21 DIAGNOSIS — C34.90 NSCLC METASTATIC TO BONE (HCC): Primary | ICD-10-CM

## 2024-05-21 DIAGNOSIS — C79.51 NSCLC METASTATIC TO BONE (HCC): Primary | ICD-10-CM

## 2024-05-21 PROCEDURE — 99214 OFFICE O/P EST MOD 30 MIN: CPT | Performed by: NURSE PRACTITIONER

## 2024-05-23 ENCOUNTER — DOCUMENTATION (OUTPATIENT)
Dept: HEMATOLOGY ONCOLOGY | Facility: CLINIC | Age: 79
End: 2024-05-23

## 2024-05-23 DIAGNOSIS — I82.4Y2 ACUTE DEEP VEIN THROMBOSIS (DVT) OF PROXIMAL VEIN OF LEFT LOWER EXTREMITY (HCC): ICD-10-CM

## 2024-05-23 NOTE — PROGRESS NOTES
Patient is Octavio Atkins  [birth year]     Rcvd notice from [employer] [facility] Patient has been approved for free drug program  Eff 5-22-24  thru 12-31-24     Ary:   [facility]  will dispense medication. Please send script to them with refills.     Please notify patient of approval.

## 2024-05-23 NOTE — TELEPHONE ENCOUNTER
Spoke with pt regarding approval for [facility] free drug. Pt states Dr. Torres (PCP) instructed him to only take 1 pill daily instead of twice a day. Script updated.

## 2024-05-26 PROBLEM — Z00.00 MEDICARE ANNUAL WELLNESS VISIT, SUBSEQUENT: Status: RESOLVED | Noted: 2022-10-17 | Resolved: 2024-05-26

## 2024-06-03 ENCOUNTER — HOSPITAL ENCOUNTER (OUTPATIENT)
Dept: INFUSION CENTER | Facility: HOSPITAL | Age: 79
Discharge: HOME | End: 2024-06-03
Payer: MEDICARE

## 2024-06-03 DIAGNOSIS — C79.51 NSCLC METASTATIC TO BONE (HCC): Primary | ICD-10-CM

## 2024-06-03 DIAGNOSIS — C34.90 NSCLC METASTATIC TO BONE (HCC): Primary | ICD-10-CM

## 2024-06-03 LAB
ALBUMIN SERPL BCP-MCNC: 3.7 G/DL (ref 3.5–5)
ALP SERPL-CCNC: 92 U/L (ref 34–104)
ALT SERPL W P-5'-P-CCNC: 25 U/L (ref 7–52)
ANION GAP SERPL CALCULATED.3IONS-SCNC: 7 MMOL/L (ref 4–13)
AST SERPL W P-5'-P-CCNC: 25 U/L (ref 13–39)
BASOPHILS # BLD AUTO: 0.04 THOUSANDS/ÂΜL (ref 0–0.1)
BASOPHILS NFR BLD AUTO: 1 % (ref 0–1)
BILIRUB SERPL-MCNC: 0.9 MG/DL (ref 0.2–1)
BUN SERPL-MCNC: 17 MG/DL (ref 5–25)
CALCIUM SERPL-MCNC: 9 MG/DL (ref 8.4–10.2)
CHLORIDE SERPL-SCNC: 105 MMOL/L (ref 96–108)
CO2 SERPL-SCNC: 28 MMOL/L (ref 21–32)
CREAT SERPL-MCNC: 0.89 MG/DL (ref 0.6–1.3)
EOSINOPHIL # BLD AUTO: 0.13 THOUSAND/ÂΜL (ref 0–0.61)
EOSINOPHIL NFR BLD AUTO: 4 % (ref 0–6)
ERYTHROCYTE [DISTWIDTH] IN BLOOD BY AUTOMATED COUNT: 14.2 % (ref 11.6–15.1)
GFR SERPL CREATININE-BSD FRML MDRD: 80 ML/MIN/1.73SQ M
GLUCOSE SERPL-MCNC: 205 MG/DL (ref 65–140)
HCT VFR BLD AUTO: 44.8 % (ref 36.5–49.3)
HGB BLD-MCNC: 14.8 G/DL (ref 12–17)
IMM GRANULOCYTES # BLD AUTO: 0.02 THOUSAND/UL (ref 0–0.2)
IMM GRANULOCYTES NFR BLD AUTO: 1 % (ref 0–2)
LYMPHOCYTES # BLD AUTO: 0.41 THOUSANDS/ÂΜL (ref 0.6–4.47)
LYMPHOCYTES NFR BLD AUTO: 12 % (ref 14–44)
MCH RBC QN AUTO: 31.3 PG (ref 26.8–34.3)
MCHC RBC AUTO-ENTMCNC: 33 G/DL (ref 31.4–37.4)
MCV RBC AUTO: 95 FL (ref 82–98)
MONOCYTES # BLD AUTO: 0.36 THOUSAND/ÂΜL (ref 0.17–1.22)
MONOCYTES NFR BLD AUTO: 11 % (ref 4–12)
NEUTROPHILS # BLD AUTO: 2.36 THOUSANDS/ÂΜL (ref 1.85–7.62)
NEUTS SEG NFR BLD AUTO: 71 % (ref 43–75)
NRBC BLD AUTO-RTO: 0 /100 WBCS
PLATELET # BLD AUTO: 148 THOUSANDS/UL (ref 149–390)
PMV BLD AUTO: 11 FL (ref 8.9–12.7)
POTASSIUM SERPL-SCNC: 3.6 MMOL/L (ref 3.5–5.3)
PROT SERPL-MCNC: 6.8 G/DL (ref 6.4–8.4)
RBC # BLD AUTO: 4.73 MILLION/UL (ref 3.88–5.62)
SODIUM SERPL-SCNC: 140 MMOL/L (ref 135–147)
T3FREE SERPL-MCNC: 3.33 PG/ML (ref 2.5–3.9)
TSH SERPL DL<=0.05 MIU/L-ACNC: 1.7 UIU/ML (ref 0.45–4.5)
WBC # BLD AUTO: 3.32 THOUSAND/UL (ref 4.31–10.16)

## 2024-06-03 PROCEDURE — 84443 ASSAY THYROID STIM HORMONE: CPT | Performed by: INTERNAL MEDICINE

## 2024-06-03 PROCEDURE — 80053 COMPREHEN METABOLIC PANEL: CPT | Performed by: INTERNAL MEDICINE

## 2024-06-03 PROCEDURE — 85025 COMPLETE CBC W/AUTO DIFF WBC: CPT | Performed by: INTERNAL MEDICINE

## 2024-06-03 PROCEDURE — 84481 FREE ASSAY (FT-3): CPT | Performed by: INTERNAL MEDICINE

## 2024-06-03 NOTE — PROGRESS NOTES
Octavio Atkins  tolerated treatment well with no complications.      Octavio Atkins is aware of future appt on 06/05/2024 at 08:30 AM.     AVS printed and given to Octavio Atkins:  No (Declined by Octavio Atkins)

## 2024-06-05 ENCOUNTER — HOSPITAL ENCOUNTER (OUTPATIENT)
Dept: INFUSION CENTER | Facility: HOSPITAL | Age: 79
Discharge: HOME | End: 2024-06-05
Attending: INTERNAL MEDICINE
Payer: MEDICARE

## 2024-06-05 VITALS
SYSTOLIC BLOOD PRESSURE: 134 MMHG | HEART RATE: 61 BPM | DIASTOLIC BLOOD PRESSURE: 77 MMHG | RESPIRATION RATE: 18 BRPM | TEMPERATURE: 98.4 F | OXYGEN SATURATION: 96 %

## 2024-06-05 DIAGNOSIS — C79.51 NSCLC METASTATIC TO BONE (HCC): Primary | ICD-10-CM

## 2024-06-05 DIAGNOSIS — C34.90 NSCLC METASTATIC TO BONE (HCC): Primary | ICD-10-CM

## 2024-06-05 PROCEDURE — 96413 CHEMO IV INFUSION 1 HR: CPT

## 2024-06-05 RX ORDER — SODIUM CHLORIDE 9 MG/ML
20 INJECTION, SOLUTION INTRAVENOUS ONCE
Status: COMPLETED | OUTPATIENT
Start: 2024-06-05 | End: 2024-06-05

## 2024-06-05 RX ADMIN — SODIUM CHLORIDE 20 ML/HR: 9 INJECTION, SOLUTION INTRAVENOUS at 09:10

## 2024-06-05 RX ADMIN — SODIUM CHLORIDE 200 MG: 9 INJECTION, SOLUTION INTRAVENOUS at 09:10

## 2024-06-05 NOTE — PROGRESS NOTES
Octavio Atkins  tolerated treatment well with no complications.      Octavio Atkins is aware of future appt on 6/24 at 9am.     AVS printed and given to Octavio Atkins:  No (Declined by Octavio Atkins)

## 2024-06-24 ENCOUNTER — HOSPITAL ENCOUNTER (OUTPATIENT)
Dept: INFUSION CENTER | Facility: HOSPITAL | Age: 79
Discharge: HOME | End: 2024-06-24
Payer: MEDICARE

## 2024-06-24 DIAGNOSIS — C79.51 NSCLC METASTATIC TO BONE (HCC): Primary | ICD-10-CM

## 2024-06-24 DIAGNOSIS — C34.90 NSCLC METASTATIC TO BONE (HCC): Primary | ICD-10-CM

## 2024-06-24 LAB
ALBUMIN SERPL BCG-MCNC: 3.7 G/DL (ref 3.5–5)
ALP SERPL-CCNC: 78 U/L (ref 34–104)
ALT SERPL W P-5'-P-CCNC: 22 U/L (ref 7–52)
ANION GAP SERPL CALCULATED.3IONS-SCNC: 5 MMOL/L (ref 4–13)
AST SERPL W P-5'-P-CCNC: 24 U/L (ref 13–39)
BASOPHILS # BLD AUTO: 0.06 THOUSANDS/ÂΜL (ref 0–0.1)
BASOPHILS NFR BLD AUTO: 2 % (ref 0–1)
BILIRUB SERPL-MCNC: 0.76 MG/DL (ref 0.2–1)
BUN SERPL-MCNC: 19 MG/DL (ref 5–25)
CALCIUM SERPL-MCNC: 9.1 MG/DL (ref 8.4–10.2)
CHLORIDE SERPL-SCNC: 109 MMOL/L (ref 96–108)
CO2 SERPL-SCNC: 28 MMOL/L (ref 21–32)
CREAT SERPL-MCNC: 0.85 MG/DL (ref 0.6–1.3)
EOSINOPHIL # BLD AUTO: 0.19 THOUSAND/ÂΜL (ref 0–0.61)
EOSINOPHIL NFR BLD AUTO: 5 % (ref 0–6)
ERYTHROCYTE [DISTWIDTH] IN BLOOD BY AUTOMATED COUNT: 14.2 % (ref 11.6–15.1)
GFR SERPL CREATININE-BSD FRML MDRD: 82 ML/MIN/1.73SQ M
GLUCOSE SERPL-MCNC: 123 MG/DL (ref 65–140)
HCT VFR BLD AUTO: 46.1 % (ref 36.5–49.3)
HGB BLD-MCNC: 15.2 G/DL (ref 12–17)
IMM GRANULOCYTES # BLD AUTO: 0.02 THOUSAND/UL (ref 0–0.2)
IMM GRANULOCYTES NFR BLD AUTO: 1 % (ref 0–2)
LYMPHOCYTES # BLD AUTO: 0.52 THOUSANDS/ÂΜL (ref 0.6–4.47)
LYMPHOCYTES NFR BLD AUTO: 14 % (ref 14–44)
MCH RBC QN AUTO: 31.5 PG (ref 26.8–34.3)
MCHC RBC AUTO-ENTMCNC: 33 G/DL (ref 31.4–37.4)
MCV RBC AUTO: 96 FL (ref 82–98)
MONOCYTES # BLD AUTO: 0.5 THOUSAND/ÂΜL (ref 0.17–1.22)
MONOCYTES NFR BLD AUTO: 13 % (ref 4–12)
NEUTROPHILS # BLD AUTO: 2.57 THOUSANDS/ÂΜL (ref 1.85–7.62)
NEUTS SEG NFR BLD AUTO: 65 % (ref 43–75)
NRBC BLD AUTO-RTO: 0 /100 WBCS
PLATELET # BLD AUTO: 159 THOUSANDS/UL (ref 149–390)
PMV BLD AUTO: 11.1 FL (ref 8.9–12.7)
POTASSIUM SERPL-SCNC: 3.7 MMOL/L (ref 3.5–5.3)
PROT SERPL-MCNC: 7.2 G/DL (ref 6.4–8.4)
RBC # BLD AUTO: 4.82 MILLION/UL (ref 3.88–5.62)
SODIUM SERPL-SCNC: 142 MMOL/L (ref 135–147)
T3FREE SERPL-MCNC: 3.72 PG/ML (ref 2.5–3.9)
TSH SERPL DL<=0.05 MIU/L-ACNC: 1.88 UIU/ML (ref 0.45–4.5)
WBC # BLD AUTO: 3.86 THOUSAND/UL (ref 4.31–10.16)

## 2024-06-24 PROCEDURE — 84481 FREE ASSAY (FT-3): CPT | Performed by: INTERNAL MEDICINE

## 2024-06-24 PROCEDURE — 80053 COMPREHEN METABOLIC PANEL: CPT | Performed by: INTERNAL MEDICINE

## 2024-06-24 PROCEDURE — 84443 ASSAY THYROID STIM HORMONE: CPT | Performed by: INTERNAL MEDICINE

## 2024-06-24 PROCEDURE — 85025 COMPLETE CBC W/AUTO DIFF WBC: CPT | Performed by: INTERNAL MEDICINE

## 2024-06-24 NOTE — PROGRESS NOTES
Octavio Atkins  tolerated treatment well with no complications.      Octavio Atkins is aware of future appt on 06/26/2024 at 08:30 AM.     AVS printed and given to Octavio Atkins:  No (Declined by Octavio Atkins)

## 2024-06-26 ENCOUNTER — HOSPITAL ENCOUNTER (OUTPATIENT)
Dept: INFUSION CENTER | Facility: HOSPITAL | Age: 79
Discharge: HOME | End: 2024-06-26
Attending: INTERNAL MEDICINE
Payer: MEDICARE

## 2024-06-26 VITALS
SYSTOLIC BLOOD PRESSURE: 141 MMHG | HEART RATE: 74 BPM | OXYGEN SATURATION: 98 % | TEMPERATURE: 97.2 F | BODY MASS INDEX: 34.31 KG/M2 | RESPIRATION RATE: 18 BRPM | HEIGHT: 72 IN | DIASTOLIC BLOOD PRESSURE: 81 MMHG

## 2024-06-26 DIAGNOSIS — C34.90 NSCLC METASTATIC TO BONE (HCC): Primary | ICD-10-CM

## 2024-06-26 DIAGNOSIS — C79.51 NSCLC METASTATIC TO BONE (HCC): Primary | ICD-10-CM

## 2024-06-26 PROCEDURE — 96413 CHEMO IV INFUSION 1 HR: CPT

## 2024-06-26 RX ORDER — SODIUM CHLORIDE 9 MG/ML
20 INJECTION, SOLUTION INTRAVENOUS ONCE
Status: COMPLETED | OUTPATIENT
Start: 2024-06-26 | End: 2024-06-26

## 2024-06-26 RX ADMIN — SODIUM CHLORIDE 20 ML/HR: 0.9 INJECTION, SOLUTION INTRAVENOUS at 08:46

## 2024-06-26 RX ADMIN — SODIUM CHLORIDE 200 MG: 9 INJECTION, SOLUTION INTRAVENOUS at 09:31

## 2024-06-26 NOTE — PROGRESS NOTES
Octavio Atkins  tolerated treatment well with no complications.      Octavio Atkins is aware of future appt on 7/15/2024 at 0900.     AVS printed and given to Octavio Atkins:   No (Declined by Octavio Atkins)

## 2024-06-30 DIAGNOSIS — E11.65 TYPE 2 DIABETES MELLITUS WITH HYPERGLYCEMIA, WITHOUT LONG-TERM CURRENT USE OF INSULIN (HCC): ICD-10-CM

## 2024-07-01 RX ORDER — LANCETS
EACH MISCELLANEOUS
Qty: 100 EACH | Refills: 1 | Status: SHIPPED | OUTPATIENT
Start: 2024-07-01

## 2024-07-09 DIAGNOSIS — E87.6 HYPOKALEMIA: ICD-10-CM

## 2024-07-09 DIAGNOSIS — C79.51 NSCLC METASTATIC TO BONE (HCC): ICD-10-CM

## 2024-07-09 DIAGNOSIS — C34.90 NSCLC METASTATIC TO BONE (HCC): ICD-10-CM

## 2024-07-10 RX ORDER — POTASSIUM CHLORIDE 20 MEQ/1
20 TABLET, EXTENDED RELEASE ORAL DAILY
Qty: 90 TABLET | Refills: 0 | Status: SHIPPED | OUTPATIENT
Start: 2024-07-10

## 2024-07-15 ENCOUNTER — HOSPITAL ENCOUNTER (OUTPATIENT)
Dept: INFUSION CENTER | Facility: HOSPITAL | Age: 79
Discharge: HOME | End: 2024-07-15
Payer: MEDICARE

## 2024-07-15 DIAGNOSIS — C79.51 NSCLC METASTATIC TO BONE (HCC): Primary | ICD-10-CM

## 2024-07-15 DIAGNOSIS — C34.90 NSCLC METASTATIC TO BONE (HCC): Primary | ICD-10-CM

## 2024-07-15 LAB
ALBUMIN SERPL BCG-MCNC: 3.8 G/DL (ref 3.5–5)
ALP SERPL-CCNC: 81 U/L (ref 34–104)
ALT SERPL W P-5'-P-CCNC: 25 U/L (ref 7–52)
ANION GAP SERPL CALCULATED.3IONS-SCNC: 6 MMOL/L (ref 4–13)
AST SERPL W P-5'-P-CCNC: 24 U/L (ref 13–39)
BASOPHILS # BLD AUTO: 0.05 THOUSANDS/ÂΜL (ref 0–0.1)
BASOPHILS NFR BLD AUTO: 1 % (ref 0–1)
BILIRUB SERPL-MCNC: 0.76 MG/DL (ref 0.2–1)
BUN SERPL-MCNC: 18 MG/DL (ref 5–25)
CALCIUM SERPL-MCNC: 9.1 MG/DL (ref 8.4–10.2)
CHLORIDE SERPL-SCNC: 106 MMOL/L (ref 96–108)
CO2 SERPL-SCNC: 28 MMOL/L (ref 21–32)
CREAT SERPL-MCNC: 0.81 MG/DL (ref 0.6–1.3)
EOSINOPHIL # BLD AUTO: 0.2 THOUSAND/ÂΜL (ref 0–0.61)
EOSINOPHIL NFR BLD AUTO: 5 % (ref 0–6)
ERYTHROCYTE [DISTWIDTH] IN BLOOD BY AUTOMATED COUNT: 14 % (ref 11.6–15.1)
GFR SERPL CREATININE-BSD FRML MDRD: 83 ML/MIN/1.73SQ M
GLUCOSE SERPL-MCNC: 159 MG/DL (ref 65–140)
HCT VFR BLD AUTO: 47.1 % (ref 36.5–49.3)
HGB BLD-MCNC: 15.6 G/DL (ref 12–17)
IMM GRANULOCYTES # BLD AUTO: 0.01 THOUSAND/UL (ref 0–0.2)
IMM GRANULOCYTES NFR BLD AUTO: 0 % (ref 0–2)
LYMPHOCYTES # BLD AUTO: 0.46 THOUSANDS/ÂΜL (ref 0.6–4.47)
LYMPHOCYTES NFR BLD AUTO: 11 % (ref 14–44)
MCH RBC QN AUTO: 31.5 PG (ref 26.8–34.3)
MCHC RBC AUTO-ENTMCNC: 33.1 G/DL (ref 31.4–37.4)
MCV RBC AUTO: 95 FL (ref 82–98)
MONOCYTES # BLD AUTO: 0.42 THOUSAND/ÂΜL (ref 0.17–1.22)
MONOCYTES NFR BLD AUTO: 10 % (ref 4–12)
NEUTROPHILS # BLD AUTO: 2.97 THOUSANDS/ÂΜL (ref 1.85–7.62)
NEUTS SEG NFR BLD AUTO: 73 % (ref 43–75)
NRBC BLD AUTO-RTO: 0 /100 WBCS
PLATELET # BLD AUTO: 149 THOUSANDS/UL (ref 149–390)
PMV BLD AUTO: 11.1 FL (ref 8.9–12.7)
POTASSIUM SERPL-SCNC: 3.5 MMOL/L (ref 3.5–5.3)
PROT SERPL-MCNC: 7 G/DL (ref 6.4–8.4)
RBC # BLD AUTO: 4.96 MILLION/UL (ref 3.88–5.62)
SODIUM SERPL-SCNC: 140 MMOL/L (ref 135–147)
T3FREE SERPL-MCNC: 3.24 PG/ML (ref 2.5–3.9)
TSH SERPL DL<=0.05 MIU/L-ACNC: 1.77 UIU/ML (ref 0.45–4.5)
WBC # BLD AUTO: 4.11 THOUSAND/UL (ref 4.31–10.16)

## 2024-07-15 PROCEDURE — 84443 ASSAY THYROID STIM HORMONE: CPT | Performed by: INTERNAL MEDICINE

## 2024-07-15 PROCEDURE — 80053 COMPREHEN METABOLIC PANEL: CPT | Performed by: INTERNAL MEDICINE

## 2024-07-15 PROCEDURE — 85025 COMPLETE CBC W/AUTO DIFF WBC: CPT | Performed by: INTERNAL MEDICINE

## 2024-07-15 PROCEDURE — 84481 FREE ASSAY (FT-3): CPT | Performed by: INTERNAL MEDICINE

## 2024-07-15 NOTE — PROGRESS NOTES
Octavio Atkins in infusion center today for central line labs. Pt tolerated port access and deaccess with no adverse reactions. Pt then d/c'd home ambulatory with steady gait.    Octavio Atkins is aware of future appt on 7/17 at 0830.

## 2024-07-16 RX ORDER — SODIUM CHLORIDE 9 MG/ML
20 INJECTION, SOLUTION INTRAVENOUS ONCE
Status: CANCELLED | OUTPATIENT
Start: 2024-07-17

## 2024-07-17 ENCOUNTER — HOSPITAL ENCOUNTER (OUTPATIENT)
Dept: INFUSION CENTER | Facility: HOSPITAL | Age: 79
Discharge: HOME | End: 2024-07-17
Attending: INTERNAL MEDICINE
Payer: MEDICARE

## 2024-07-17 VITALS
DIASTOLIC BLOOD PRESSURE: 72 MMHG | SYSTOLIC BLOOD PRESSURE: 129 MMHG | TEMPERATURE: 97.5 F | HEART RATE: 69 BPM | RESPIRATION RATE: 18 BRPM | OXYGEN SATURATION: 100 %

## 2024-07-17 DIAGNOSIS — C34.90 NSCLC METASTATIC TO BONE (HCC): Primary | ICD-10-CM

## 2024-07-17 DIAGNOSIS — C79.51 NSCLC METASTATIC TO BONE (HCC): Primary | ICD-10-CM

## 2024-07-17 PROCEDURE — 96413 CHEMO IV INFUSION 1 HR: CPT

## 2024-07-17 RX ORDER — SODIUM CHLORIDE 9 MG/ML
20 INJECTION, SOLUTION INTRAVENOUS ONCE
Status: COMPLETED | OUTPATIENT
Start: 2024-07-17 | End: 2024-07-17

## 2024-07-17 RX ADMIN — SODIUM CHLORIDE 20 ML/HR: 9 INJECTION, SOLUTION INTRAVENOUS at 09:08

## 2024-07-17 RX ADMIN — SODIUM CHLORIDE 200 MG: 9 INJECTION, SOLUTION INTRAVENOUS at 09:12

## 2024-07-17 NOTE — PROGRESS NOTES
Octavio Atkins  tolerated treatment well with no complications.      Octavio Atkins is aware of future appt on 8/5 at 0800.     AVS printed and given to Octavio Atkins:  Yes

## 2024-07-21 DIAGNOSIS — E78.2 MIXED HYPERLIPIDEMIA: ICD-10-CM

## 2024-07-21 RX ORDER — LOVASTATIN 20 MG/1
TABLET ORAL
Qty: 90 TABLET | Refills: 1 | Status: SHIPPED | OUTPATIENT
Start: 2024-07-21

## 2024-07-22 ENCOUNTER — OFFICE VISIT (OUTPATIENT)
Age: 79
End: 2024-07-22
Payer: MEDICARE

## 2024-07-22 VITALS
BODY MASS INDEX: 34.95 KG/M2 | WEIGHT: 258 LBS | OXYGEN SATURATION: 96 % | HEIGHT: 72 IN | TEMPERATURE: 97.6 F | HEART RATE: 61 BPM | RESPIRATION RATE: 18 BRPM | SYSTOLIC BLOOD PRESSURE: 136 MMHG | DIASTOLIC BLOOD PRESSURE: 76 MMHG

## 2024-07-22 DIAGNOSIS — C34.90 NSCLC METASTATIC TO BONE (HCC): Primary | ICD-10-CM

## 2024-07-22 DIAGNOSIS — C79.51 NSCLC METASTATIC TO BONE (HCC): Primary | ICD-10-CM

## 2024-07-22 PROCEDURE — G2211 COMPLEX E/M VISIT ADD ON: HCPCS | Performed by: INTERNAL MEDICINE

## 2024-07-22 PROCEDURE — 99213 OFFICE O/P EST LOW 20 MIN: CPT | Performed by: INTERNAL MEDICINE

## 2024-07-22 RX ORDER — SODIUM CHLORIDE 9 MG/ML
20 INJECTION, SOLUTION INTRAVENOUS ONCE
OUTPATIENT
Start: 2024-08-07

## 2024-07-22 RX ORDER — SODIUM CHLORIDE 9 MG/ML
20 INJECTION, SOLUTION INTRAVENOUS ONCE
OUTPATIENT
Start: 2024-10-30

## 2024-07-22 RX ORDER — SODIUM CHLORIDE 9 MG/ML
20 INJECTION, SOLUTION INTRAVENOUS ONCE
OUTPATIENT
Start: 2024-11-20

## 2024-07-22 RX ORDER — SODIUM CHLORIDE 9 MG/ML
20 INJECTION, SOLUTION INTRAVENOUS ONCE
OUTPATIENT
Start: 2024-10-09

## 2024-07-22 RX ORDER — SODIUM CHLORIDE 9 MG/ML
20 INJECTION, SOLUTION INTRAVENOUS ONCE
OUTPATIENT
Start: 2024-08-28

## 2024-07-22 RX ORDER — SODIUM CHLORIDE 9 MG/ML
20 INJECTION, SOLUTION INTRAVENOUS ONCE
OUTPATIENT
Start: 2025-01-01

## 2024-07-22 RX ORDER — SODIUM CHLORIDE 9 MG/ML
20 INJECTION, SOLUTION INTRAVENOUS ONCE
OUTPATIENT
Start: 2024-12-11

## 2024-07-22 RX ORDER — SODIUM CHLORIDE 9 MG/ML
20 INJECTION, SOLUTION INTRAVENOUS ONCE
OUTPATIENT
Start: 2024-09-18

## 2024-07-25 DIAGNOSIS — E11.65 TYPE 2 DIABETES MELLITUS WITH HYPERGLYCEMIA, WITHOUT LONG-TERM CURRENT USE OF INSULIN (HCC): ICD-10-CM

## 2024-07-25 NOTE — PROGRESS NOTES
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)   3/1/2018 -  Cancer Staged    Staging form: Lung, AJCC 8th Edition  - Clinical stage from 3/1/2018: Stage IVB (cTX, cN2, cM1c) - Signed by Ally Lee DO on 2022 Initial Diagnosis    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)     2022 - 2022 Chemotherapy    pembrolizumab (KEYTRUDA) IVPB, 200 mg, Intravenous, Once, 10 of 13 cycles  Administration: 200 mg (2022), 200 mg (6/15/2022), 200 mg (2022), 200 mg (2022), 200 mg (2022), 200 mg (2022), 200 mg (2022), 200 mg (10/19/2022), 200 mg (2022), 200 mg (2022)     2022 - 2022 Chemotherapy    pembrolizumab (KEYTRUDA) IVPB, 400 mg, Intravenous, Once, 1 of 6 cycles     2022 -  Chemotherapy    alteplase (CATHFLO), 2 mg, Intracatheter, Every 2 hour PRN, 27 of 37 cycles  pembrolizumab (KEYTRUDA) IVPB, 200 mg, Intravenous, Once, 27 of 37 cycles  Administration: 200 mg (2022), 200 mg (2023), 200 mg (2023), 200 mg (2023), 200 mg (3/15/2023), 200 mg (2023), 200 mg (2023), 200 mg (2023), 200 mg (2023), 200 mg (2023), 200 mg (2023), 200 mg (2023), 200 mg (2023), 200 mg (2023), 200 mg (10/11/2023), 200 mg (2023), 200 mg (2023), 200 mg (2023), 200 mg (1/3/2024), 200 mg (2024), 200 mg (3/14/2024), 200 mg (4/3/2024), 200 mg (2024), 200 mg (5/15/2024), 200 mg (2024), 200 mg (2024), 200 mg (2024)          Interval History: Patient presents for follow up of his stage IV non-small cell lung cancer.  He has been on maintenance Keytruda every 3 months.  Labs prior to this visit are normal.  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  Negative margin have been obtained on all of them.  He is also on Eliquis for a DVT.  He denies any bleeding or high cost of the Eliquis.         REVIEW OF SYSTEMS:  Please note that a 14-point review of systems was performed to include Constitutional, HEENT, Respiratory, CVS, GI, , Musculoskeletal, Integumentary, Neurologic, Rheumatologic, Endocrinologic, Psychiatric, Lymphatic, and Hematologic/Oncologic systems were reviewed and are negative unless otherwise stated in HPI. Positive and negative findings pertinent to this evaluation are incorporated into the history of present illness.      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  He has not had any major autoimmune problems and does not wish to stop it.  He has been on it since 2019.  He asked if there is anything that we could add to help the squamous cell carcinoma of the skin.  I told him that Keytruda actually can control this as well and there is really not much more that we can add at this time.  Cemiplimab is another immunotherapy for squamous cell carcinoma of the skin but you do not use it in combination with the pembrolizumab.  He  will continue with Eliqulauren for his history of DVT.  He will see my nurse practitioner in October for ongoing evaluation.  We will plan on another PET/CT in January for ongoing evaluation.       I spent 20 minutes on chart review, face to face counseling time, coordination of care and documentation.    Past Medical History:   has a past medical history of DVT of deep femoral vein, left (HCC) (04/18/2023).    PAST SURGICAL HISTORY:   has a past surgical history that includes Elbow surgery (Left, 2004).    CURRENT MEDICATIONS  Current Outpatient Medications   Medication Sig Dispense Refill    apixaban (Eliquis) 5 mg Take 1 tablet (5 mg total) by mouth daily 60 tablet 11    Calcium Carb-Cholecalciferol (CALCIUM 1000 + D PO) Take by mouth      Cholecalciferol (Vitamin D3) 1.25 MG (48959 UT) CAPS Take by mouth      Contour Next Test test strip USE 1 STRIP TO CHECK GLUCOSE ONCE DAILY 100 each 1    dorzolamide (TRUSOPT) 2 % ophthalmic solution       dorzolamide-timolol (COSOPT) 22.3-6.8 MG/ML ophthalmic solution INSTILL 1 DROP INTO EACH EYE TWICE DAILY      glipiZIDE (GLUCOTROL XL) 2.5 mg 24 hr tablet Take 1 tablet by mouth once daily 90 tablet 0    hydroCHLOROthiazide 25 mg tablet Take 1 tablet by mouth once daily 90 tablet 1    Lancet Devices (Microlet Next Lancing Device) MISC USE ONCE DAILY IN THE MORNING TO TEST BLOOD SUGAR 1 each 0    lisinopril (ZESTRIL) 20 mg tablet Take 1 tablet by mouth once daily 90 tablet 1    lovastatin (MEVACOR) 20 mg tablet Take 1 tablet by mouth once daily 90 tablet 1    methocarbamol (ROBAXIN) 500 mg tablet Take 1 tablet (500 mg total) by mouth 3 (three) times a day 20 tablet 0    Microlet Lancets MISC USE 1 NEW LANCET TO CHECK GLUCOSE ONCE DAILY 100 each 1    mupirocin (BACTROBAN) 2 % ointment Apply topically 3 (three) times a day as needed (skin infection)      potassium chloride (Klor-Con M20) 20 mEq tablet Take 1 tablet (20 mEq total) by mouth daily 90 tablet 0     No current  "facility-administered medications for this visit.     [unfilled]    SOCIAL HISTORY:   reports that he quit smoking about 18 years ago. His smoking use included cigarettes. He started smoking about 67 years ago. He has a 50 pack-year smoking history. He has never used smokeless tobacco. He reports current alcohol use of about 21.0 standard drinks of alcohol per week. He reports that he does not use drugs.     FAMILY HISTORY:  family history includes Colon cancer in his father.     ALLERGIES:  is allergic to aspirin and ibuprofen.      Physical Exam:  Vital Signs:   Visit Vitals  /76 (BP Location: Left arm, Patient Position: Sitting, Cuff Size: Standard)   Pulse 61   Temp 97.6 °F (36.4 °C) (Temporal)   Resp 18   Ht 6' 0.01\" (1.829 m)   Wt 117 kg (258 lb)   SpO2 96%   BMI 34.98 kg/m²   Smoking Status Former   BSA 2.37 m²     Body mass index is 34.98 kg/m².  Body surface area is 2.37 meters squared.    GEN: Alert, awake oriented x3, in no acute distress  HEENT- No pallor, icterus, cyanosis, no oral mucosal lesions,   LAD - no palpable cervical, clavicle, axillary, inguinal LAD  Heart- normal S1 S2, regular rate and rhythm, No murmur, rubs.   Lungs- clear breathing sound bilateral.   Abdomen- soft, Non tender, bowel sounds present  Extremities- No cyanosis, clubbing, edema  Neuro- No focal neurological deficit    Labs:  Lab Results   Component Value Date    WBC 4.11 (L) 07/15/2024    HGB 15.6 07/15/2024    HCT 47.1 07/15/2024    MCV 95 07/15/2024     07/15/2024     Lab Results   Component Value Date    SODIUM 140 07/15/2024    K 3.5 07/15/2024     07/15/2024    CO2 28 07/15/2024    AGAP 6 07/15/2024    BUN 18 07/15/2024    CREATININE 0.81 07/15/2024    GLUC 159 (H) 07/15/2024    GLUF 188 (H) 01/30/2023    CALCIUM 9.1 07/15/2024    AST 24 07/15/2024    ALT 25 07/15/2024    ALKPHOS 81 07/15/2024    TP 7.0 07/15/2024    TBILI 0.76 07/15/2024    EGFR 83 07/15/2024       "

## 2024-07-26 RX ORDER — GLIPIZIDE 2.5 MG/1
TABLET, EXTENDED RELEASE ORAL
Qty: 100 TABLET | Refills: 1 | Status: SHIPPED | OUTPATIENT
Start: 2024-07-26

## 2024-08-05 ENCOUNTER — HOSPITAL ENCOUNTER (OUTPATIENT)
Dept: INFUSION CENTER | Facility: HOSPITAL | Age: 79
Discharge: HOME | End: 2024-08-05
Payer: MEDICARE

## 2024-08-05 DIAGNOSIS — C34.90 NSCLC METASTATIC TO BONE (HCC): Primary | ICD-10-CM

## 2024-08-05 DIAGNOSIS — C79.51 NSCLC METASTATIC TO BONE (HCC): Primary | ICD-10-CM

## 2024-08-05 LAB
ALBUMIN SERPL BCG-MCNC: 3.6 G/DL (ref 3.5–5)
ALP SERPL-CCNC: 83 U/L (ref 34–104)
ALT SERPL W P-5'-P-CCNC: 23 U/L (ref 7–52)
ANION GAP SERPL CALCULATED.3IONS-SCNC: 6 MMOL/L (ref 4–13)
AST SERPL W P-5'-P-CCNC: 24 U/L (ref 13–39)
BASOPHILS # BLD AUTO: 0.04 THOUSANDS/ÂΜL (ref 0–0.1)
BASOPHILS NFR BLD AUTO: 1 % (ref 0–1)
BILIRUB SERPL-MCNC: 0.81 MG/DL (ref 0.2–1)
BUN SERPL-MCNC: 18 MG/DL (ref 5–25)
CALCIUM SERPL-MCNC: 9 MG/DL (ref 8.4–10.2)
CHLORIDE SERPL-SCNC: 107 MMOL/L (ref 96–108)
CO2 SERPL-SCNC: 29 MMOL/L (ref 21–32)
CREAT SERPL-MCNC: 0.84 MG/DL (ref 0.6–1.3)
EOSINOPHIL # BLD AUTO: 0.19 THOUSAND/ÂΜL (ref 0–0.61)
EOSINOPHIL NFR BLD AUTO: 5 % (ref 0–6)
ERYTHROCYTE [DISTWIDTH] IN BLOOD BY AUTOMATED COUNT: 14.1 % (ref 11.6–15.1)
GFR SERPL CREATININE-BSD FRML MDRD: 82 ML/MIN/1.73SQ M
GLUCOSE SERPL-MCNC: 133 MG/DL (ref 65–140)
HCT VFR BLD AUTO: 45 % (ref 36.5–49.3)
HGB BLD-MCNC: 15.2 G/DL (ref 12–17)
IMM GRANULOCYTES # BLD AUTO: 0.02 THOUSAND/UL (ref 0–0.2)
IMM GRANULOCYTES NFR BLD AUTO: 1 % (ref 0–2)
LYMPHOCYTES # BLD AUTO: 0.52 THOUSANDS/ÂΜL (ref 0.6–4.47)
LYMPHOCYTES NFR BLD AUTO: 14 % (ref 14–44)
MCH RBC QN AUTO: 31.9 PG (ref 26.8–34.3)
MCHC RBC AUTO-ENTMCNC: 33.8 G/DL (ref 31.4–37.4)
MCV RBC AUTO: 94 FL (ref 82–98)
MONOCYTES # BLD AUTO: 0.45 THOUSAND/ÂΜL (ref 0.17–1.22)
MONOCYTES NFR BLD AUTO: 12 % (ref 4–12)
NEUTROPHILS # BLD AUTO: 2.6 THOUSANDS/ÂΜL (ref 1.85–7.62)
NEUTS SEG NFR BLD AUTO: 67 % (ref 43–75)
NRBC BLD AUTO-RTO: 0 /100 WBCS
PLATELET # BLD AUTO: 144 THOUSANDS/UL (ref 149–390)
PMV BLD AUTO: 10.5 FL (ref 8.9–12.7)
POTASSIUM SERPL-SCNC: 3.6 MMOL/L (ref 3.5–5.3)
PROT SERPL-MCNC: 6.7 G/DL (ref 6.4–8.4)
RBC # BLD AUTO: 4.77 MILLION/UL (ref 3.88–5.62)
SODIUM SERPL-SCNC: 142 MMOL/L (ref 135–147)
T3FREE SERPL-MCNC: 3.26 PG/ML (ref 2.5–3.9)
TSH SERPL DL<=0.05 MIU/L-ACNC: 1.7 UIU/ML (ref 0.45–4.5)
WBC # BLD AUTO: 3.82 THOUSAND/UL (ref 4.31–10.16)

## 2024-08-05 PROCEDURE — 85025 COMPLETE CBC W/AUTO DIFF WBC: CPT | Performed by: INTERNAL MEDICINE

## 2024-08-05 PROCEDURE — 84481 FREE ASSAY (FT-3): CPT | Performed by: INTERNAL MEDICINE

## 2024-08-05 PROCEDURE — 84443 ASSAY THYROID STIM HORMONE: CPT | Performed by: INTERNAL MEDICINE

## 2024-08-05 PROCEDURE — 80053 COMPREHEN METABOLIC PANEL: CPT | Performed by: INTERNAL MEDICINE

## 2024-08-05 NOTE — PROGRESS NOTES
Octavio Atkins  tolerated treatment well with no complications.      Octavio Atkins is aware of future appt on 8/7/24 at 0900.     AVS printed and given to Octavio Atkins:  No (Declined by Octavio Atkins)

## 2024-08-07 ENCOUNTER — HOSPITAL ENCOUNTER (OUTPATIENT)
Dept: INFUSION CENTER | Facility: HOSPITAL | Age: 79
Discharge: HOME | End: 2024-08-07
Attending: INTERNAL MEDICINE
Payer: MEDICARE

## 2024-08-07 VITALS
TEMPERATURE: 97.6 F | RESPIRATION RATE: 16 BRPM | OXYGEN SATURATION: 95 % | DIASTOLIC BLOOD PRESSURE: 67 MMHG | SYSTOLIC BLOOD PRESSURE: 123 MMHG | HEART RATE: 80 BPM

## 2024-08-07 DIAGNOSIS — C79.51 NSCLC METASTATIC TO BONE (HCC): Primary | ICD-10-CM

## 2024-08-07 DIAGNOSIS — C34.90 NSCLC METASTATIC TO BONE (HCC): Primary | ICD-10-CM

## 2024-08-07 PROCEDURE — 96413 CHEMO IV INFUSION 1 HR: CPT

## 2024-08-07 RX ORDER — SODIUM CHLORIDE 9 MG/ML
20 INJECTION, SOLUTION INTRAVENOUS ONCE
Status: COMPLETED | OUTPATIENT
Start: 2024-08-07 | End: 2024-08-07

## 2024-08-07 RX ADMIN — SODIUM CHLORIDE 200 MG: 9 INJECTION, SOLUTION INTRAVENOUS at 09:34

## 2024-08-07 RX ADMIN — SODIUM CHLORIDE 20 ML/HR: 9 INJECTION, SOLUTION INTRAVENOUS at 09:33

## 2024-08-07 NOTE — PROGRESS NOTES
Octavio Atkins  tolerated treatment well with no complications.      Octavio Atkins is aware of future appt on 8/26/2024 at 0830.     AVS printed and given to Octavio Atkins:  No (Declined by Octavio Atkins)

## 2024-08-12 ENCOUNTER — RA CDI HCC (OUTPATIENT)
Dept: OTHER | Facility: HOSPITAL | Age: 79
End: 2024-08-12

## 2024-08-26 ENCOUNTER — HOSPITAL ENCOUNTER (OUTPATIENT)
Dept: INFUSION CENTER | Facility: HOSPITAL | Age: 79
Discharge: HOME | End: 2024-08-26
Payer: MEDICARE

## 2024-08-26 DIAGNOSIS — C34.90 NSCLC METASTATIC TO BONE (HCC): Primary | ICD-10-CM

## 2024-08-26 DIAGNOSIS — C79.51 NSCLC METASTATIC TO BONE (HCC): Primary | ICD-10-CM

## 2024-08-26 LAB
ALBUMIN SERPL BCG-MCNC: 3.5 G/DL (ref 3.5–5)
ALP SERPL-CCNC: 89 U/L (ref 34–104)
ALT SERPL W P-5'-P-CCNC: 22 U/L (ref 7–52)
ANION GAP SERPL CALCULATED.3IONS-SCNC: 5 MMOL/L (ref 4–13)
AST SERPL W P-5'-P-CCNC: 26 U/L (ref 13–39)
BASOPHILS # BLD AUTO: 0.03 THOUSANDS/ÂΜL (ref 0–0.1)
BASOPHILS NFR BLD AUTO: 1 % (ref 0–1)
BILIRUB SERPL-MCNC: 0.83 MG/DL (ref 0.2–1)
BUN SERPL-MCNC: 21 MG/DL (ref 5–25)
CALCIUM SERPL-MCNC: 8.5 MG/DL (ref 8.4–10.2)
CHLORIDE SERPL-SCNC: 105 MMOL/L (ref 96–108)
CO2 SERPL-SCNC: 29 MMOL/L (ref 21–32)
CREAT SERPL-MCNC: 0.83 MG/DL (ref 0.6–1.3)
EOSINOPHIL # BLD AUTO: 0.2 THOUSAND/ÂΜL (ref 0–0.61)
EOSINOPHIL NFR BLD AUTO: 6 % (ref 0–6)
ERYTHROCYTE [DISTWIDTH] IN BLOOD BY AUTOMATED COUNT: 14 % (ref 11.6–15.1)
GFR SERPL CREATININE-BSD FRML MDRD: 83 ML/MIN/1.73SQ M
GLUCOSE SERPL-MCNC: 139 MG/DL (ref 65–140)
HCT VFR BLD AUTO: 43.6 % (ref 36.5–49.3)
HGB BLD-MCNC: 14.9 G/DL (ref 12–17)
IMM GRANULOCYTES # BLD AUTO: 0.03 THOUSAND/UL (ref 0–0.2)
IMM GRANULOCYTES NFR BLD AUTO: 1 % (ref 0–2)
LYMPHOCYTES # BLD AUTO: 0.43 THOUSANDS/ÂΜL (ref 0.6–4.47)
LYMPHOCYTES NFR BLD AUTO: 12 % (ref 14–44)
MCH RBC QN AUTO: 31.6 PG (ref 26.8–34.3)
MCHC RBC AUTO-ENTMCNC: 34.2 G/DL (ref 31.4–37.4)
MCV RBC AUTO: 93 FL (ref 82–98)
MONOCYTES # BLD AUTO: 0.51 THOUSAND/ÂΜL (ref 0.17–1.22)
MONOCYTES NFR BLD AUTO: 14 % (ref 4–12)
NEUTROPHILS # BLD AUTO: 2.45 THOUSANDS/ÂΜL (ref 1.85–7.62)
NEUTS SEG NFR BLD AUTO: 66 % (ref 43–75)
NRBC BLD AUTO-RTO: 0 /100 WBCS
PLATELET # BLD AUTO: 174 THOUSANDS/UL (ref 149–390)
PMV BLD AUTO: 10.9 FL (ref 8.9–12.7)
POTASSIUM SERPL-SCNC: 3.6 MMOL/L (ref 3.5–5.3)
PROT SERPL-MCNC: 6.8 G/DL (ref 6.4–8.4)
RBC # BLD AUTO: 4.71 MILLION/UL (ref 3.88–5.62)
SODIUM SERPL-SCNC: 139 MMOL/L (ref 135–147)
T3FREE SERPL-MCNC: 3.07 PG/ML (ref 2.5–3.9)
TSH SERPL DL<=0.05 MIU/L-ACNC: 2.01 UIU/ML (ref 0.45–4.5)
WBC # BLD AUTO: 3.65 THOUSAND/UL (ref 4.31–10.16)

## 2024-08-26 PROCEDURE — 85025 COMPLETE CBC W/AUTO DIFF WBC: CPT | Performed by: INTERNAL MEDICINE

## 2024-08-26 PROCEDURE — 80053 COMPREHEN METABOLIC PANEL: CPT | Performed by: INTERNAL MEDICINE

## 2024-08-26 PROCEDURE — 84443 ASSAY THYROID STIM HORMONE: CPT | Performed by: INTERNAL MEDICINE

## 2024-08-26 PROCEDURE — 84481 FREE ASSAY (FT-3): CPT | Performed by: INTERNAL MEDICINE

## 2024-08-26 NOTE — PROGRESS NOTES
Octavio Atkins  tolerated treatment well with no complications.      Octavio Atkins is aware of future appt on 8/28 at 0830.     AVS printed and given to Octavio Atkins:  No (Declined by Octavio Atkins)

## 2024-08-27 ENCOUNTER — OFFICE VISIT (OUTPATIENT)
Dept: FAMILY MEDICINE CLINIC | Facility: HOSPITAL | Age: 79
End: 2024-08-27
Payer: MEDICARE

## 2024-08-27 VITALS
WEIGHT: 248.8 LBS | HEIGHT: 72 IN | TEMPERATURE: 98.1 F | HEART RATE: 64 BPM | OXYGEN SATURATION: 96 % | DIASTOLIC BLOOD PRESSURE: 70 MMHG | BODY MASS INDEX: 33.7 KG/M2 | SYSTOLIC BLOOD PRESSURE: 122 MMHG

## 2024-08-27 DIAGNOSIS — I10 ESSENTIAL HYPERTENSION: ICD-10-CM

## 2024-08-27 DIAGNOSIS — C34.90 NSCLC METASTATIC TO BONE (HCC): ICD-10-CM

## 2024-08-27 DIAGNOSIS — U07.1 COVID: ICD-10-CM

## 2024-08-27 DIAGNOSIS — Z79.01 LONG TERM CURRENT USE OF ANTICOAGULANT: ICD-10-CM

## 2024-08-27 DIAGNOSIS — C79.51 NSCLC METASTATIC TO BONE (HCC): ICD-10-CM

## 2024-08-27 DIAGNOSIS — E11.65 TYPE 2 DIABETES MELLITUS WITH HYPERGLYCEMIA, WITHOUT LONG-TERM CURRENT USE OF INSULIN (HCC): Primary | ICD-10-CM

## 2024-08-27 DIAGNOSIS — R60.0 UNILATERAL EDEMA OF LOWER EXTREMITY: ICD-10-CM

## 2024-08-27 LAB — SL AMB POCT HEMOGLOBIN AIC: 6 (ref ?–6.5)

## 2024-08-27 PROCEDURE — 99214 OFFICE O/P EST MOD 30 MIN: CPT | Performed by: FAMILY MEDICINE

## 2024-08-27 PROCEDURE — 83036 HEMOGLOBIN GLYCOSYLATED A1C: CPT | Performed by: FAMILY MEDICINE

## 2024-08-27 NOTE — PROGRESS NOTES
Ambulatory Visit  Name: Octavio Atkins      : 1945      MRN: [id number]  Encounter Provider: Davide Torres MD  Encounter Date: 2024   Encounter department: [facility] [address]     Assessment & Plan   1. Type 2 diabetes mellitus with hyperglycemia, without long-term current use of insulin ([facility])  Assessment & Plan:    Lab Results   Component Value Date    HGBA1C 6.0 2024     Orders:  -     POCT hemoglobin A1c  2. Essential hypertension  Assessment & Plan:  Excellent BP control  3. Unilateral edema of lower extremity  4. Long term current use of anticoagulant  5. 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 COVID  Assessment & Plan:  COVID resolving         History of Present Illness     4 month follow up    Had COVID 10 days ago, now is coughing and wants to be sure he doesn't have fluid in his lungs    Tolerating infusions every three weeks and is mulling over decision to continue or stop Keytruda.    Aware he lost 10 lbs, appetite has been very good    Glucometers are from 88 to 210      Review of Systems   Constitutional:  Negative for unexpected weight change.   Respiratory:  Positive for cough.    Cardiovascular:  Positive for leg swelling.   Musculoskeletal:  Positive for arthralgias.   All other systems reviewed and are negative.    Past Medical History:   Diagnosis Date    DVT of deep femoral vein, left ([facility]) 2023     Past Surgical History:   Procedure Laterality Date    ELBOW SURGERY Left     pinched nerve     Family History   Problem Relation Age of Onset    Colon cancer Father      Social History     Tobacco Use    Smoking status: Former     Current packs/day: 0.00     Average packs/day: 1 pack/day for 50.0 years (50.0 ttl pk-yrs)     Types: Cigarettes     Start date:      Quit date: 2006     Years since quittin.6    Smokeless tobacco: Never   Vaping Use    Vaping status: Never Used   Substance and Sexual Activity    Alcohol use: Yes      "Alcohol/week: 21.0 standard drinks of alcohol     Types: 21 Glasses of wine per week     Comment: social drinker    Drug use: Never    Sexual activity: Not Currently     Partners: Female     Current Outpatient Medications on File Prior to Visit   Medication Sig    apixaban (Eliquis) 5 mg Take 1 tablet (5 mg total) by mouth daily    Calcium Carb-Cholecalciferol (CALCIUM 1000 + D PO) Take by mouth    Cholecalciferol (Vitamin D3) 1.25 MG (13941 UT) CAPS Take by mouth    Contour Next Test test strip USE 1 STRIP TO CHECK GLUCOSE ONCE DAILY    dorzolamide (TRUSOPT) 2 % ophthalmic solution     dorzolamide-timolol (COSOPT) 22.3-6.8 MG/ML ophthalmic solution INSTILL 1 DROP INTO EACH EYE TWICE DAILY    glipiZIDE (GLUCOTROL XL) 2.5 mg 24 hr tablet Take 1 tablet by mouth once daily    hydroCHLOROthiazide 25 mg tablet Take 1 tablet by mouth once daily    Lancet Devices (Microlet Next Lancing Device) MISC USE ONCE DAILY IN THE MORNING TO TEST BLOOD SUGAR    lisinopril (ZESTRIL) 20 mg tablet Take 1 tablet by mouth once daily    lovastatin (MEVACOR) 20 mg tablet Take 1 tablet by mouth once daily    methocarbamol (ROBAXIN) 500 mg tablet Take 1 tablet (500 mg total) by mouth 3 (three) times a day    Microlet Lancets MISC USE 1 NEW LANCET TO CHECK GLUCOSE ONCE DAILY    mupirocin (BACTROBAN) 2 % ointment Apply topically 3 (three) times a day as needed (skin infection)    potassium chloride (Klor-Con M20) 20 mEq tablet Take 1 tablet (20 mEq total) by mouth daily     Allergies   Allergen Reactions    Aspirin Hives    Ibuprofen Hives     Immunization History   Administered Date(s) Administered    COVID-19 MODERNA VACC 0.5 ML IM 01/30/2021, 02/27/2021, 01/04/2022    Influenza, high dose seasonal 0.7 mL 10/17/2022, 12/22/2023    Zoster 05/08/2023, 07/14/2023     Objective     /70 (BP Location: Left arm, Patient Position: Sitting, Cuff Size: Large)   Pulse 64   Temp 98.1 °F (36.7 °C) (Tympanic)   Ht 6' 0.01\" (1.829 m)   Wt 113 kg " (248 lb 12.8 oz)   SpO2 96%   BMI 33.73 kg/m²     Physical Exam  Vitals and nursing note reviewed.   Neck:      Vascular: No carotid bruit.   Cardiovascular:      Rate and Rhythm: Regular rhythm.      Heart sounds: Normal heart sounds.   Pulmonary:      Breath sounds: Normal breath sounds.   Musculoskeletal:      Right lower leg: Edema present.      Left lower leg: Edema present.   Neurological:      Mental Status: He is alert.   Psychiatric:         Mood and Affect: Mood normal.

## 2024-08-28 ENCOUNTER — HOSPITAL ENCOUNTER (OUTPATIENT)
Dept: INFUSION CENTER | Facility: HOSPITAL | Age: 79
Discharge: HOME | End: 2024-08-28
Attending: INTERNAL MEDICINE
Payer: MEDICARE

## 2024-08-28 VITALS
HEART RATE: 76 BPM | DIASTOLIC BLOOD PRESSURE: 62 MMHG | RESPIRATION RATE: 17 BRPM | WEIGHT: 251 LBS | TEMPERATURE: 98.2 F | SYSTOLIC BLOOD PRESSURE: 132 MMHG | OXYGEN SATURATION: 94 % | BODY MASS INDEX: 34 KG/M2 | HEIGHT: 72 IN

## 2024-08-28 DIAGNOSIS — C34.90 NSCLC METASTATIC TO BONE (HCC): Primary | ICD-10-CM

## 2024-08-28 DIAGNOSIS — C79.51 NSCLC METASTATIC TO BONE (HCC): Primary | ICD-10-CM

## 2024-08-28 PROCEDURE — 96413 CHEMO IV INFUSION 1 HR: CPT

## 2024-08-28 RX ORDER — SODIUM CHLORIDE 9 MG/ML
20 INJECTION, SOLUTION INTRAVENOUS ONCE
Status: COMPLETED | OUTPATIENT
Start: 2024-08-28 | End: 2024-08-28

## 2024-08-28 RX ADMIN — SODIUM CHLORIDE 20 ML/HR: 9 INJECTION, SOLUTION INTRAVENOUS at 08:31

## 2024-08-28 RX ADMIN — SODIUM CHLORIDE 200 MG: 9 INJECTION, SOLUTION INTRAVENOUS at 08:55

## 2024-08-28 NOTE — PROGRESS NOTES
Octavio Atkins  tolerated treatment well with no complications.      Octavio Atkins is aware of future appt on 9/16/2024 at 0900.     AVS printed and given to Octavio Atkins:  No (Declined by Octavio Atkins)

## 2024-08-31 PROBLEM — U07.1 COVID: Status: ACTIVE | Noted: 2024-08-31

## 2024-09-16 ENCOUNTER — HOSPITAL ENCOUNTER (OUTPATIENT)
Dept: INFUSION CENTER | Facility: HOSPITAL | Age: 79
Discharge: HOME | End: 2024-09-16
Payer: MEDICARE

## 2024-09-16 DIAGNOSIS — C34.90 NSCLC METASTATIC TO BONE (HCC): Primary | ICD-10-CM

## 2024-09-16 DIAGNOSIS — C79.51 NSCLC METASTATIC TO BONE (HCC): Primary | ICD-10-CM

## 2024-09-16 LAB
ALBUMIN SERPL BCG-MCNC: 3.7 G/DL (ref 3.5–5)
ALP SERPL-CCNC: 97 U/L (ref 34–104)
ALT SERPL W P-5'-P-CCNC: 25 U/L (ref 7–52)
ANION GAP SERPL CALCULATED.3IONS-SCNC: 6 MMOL/L (ref 4–13)
AST SERPL W P-5'-P-CCNC: 27 U/L (ref 13–39)
BASOPHILS # BLD AUTO: 0.04 THOUSANDS/ΜL (ref 0–0.1)
BASOPHILS NFR BLD AUTO: 1 % (ref 0–1)
BILIRUB SERPL-MCNC: 1 MG/DL (ref 0.2–1)
BUN SERPL-MCNC: 19 MG/DL (ref 5–25)
CALCIUM SERPL-MCNC: 8.8 MG/DL (ref 8.4–10.2)
CHLORIDE SERPL-SCNC: 105 MMOL/L (ref 96–108)
CO2 SERPL-SCNC: 29 MMOL/L (ref 21–32)
CREAT SERPL-MCNC: 0.75 MG/DL (ref 0.6–1.3)
EOSINOPHIL # BLD AUTO: 0.18 THOUSAND/ΜL (ref 0–0.61)
EOSINOPHIL NFR BLD AUTO: 5 % (ref 0–6)
ERYTHROCYTE [DISTWIDTH] IN BLOOD BY AUTOMATED COUNT: 14.4 % (ref 11.6–15.1)
GFR SERPL CREATININE-BSD FRML MDRD: 86 ML/MIN/1.73SQ M
GLUCOSE SERPL-MCNC: 122 MG/DL (ref 65–140)
HCT VFR BLD AUTO: 45.4 % (ref 36.5–49.3)
HGB BLD-MCNC: 15.4 G/DL (ref 12–17)
IMM GRANULOCYTES # BLD AUTO: 0.01 THOUSAND/UL (ref 0–0.2)
IMM GRANULOCYTES NFR BLD AUTO: 0 % (ref 0–2)
LYMPHOCYTES # BLD AUTO: 0.44 THOUSANDS/ΜL (ref 0.6–4.47)
LYMPHOCYTES NFR BLD AUTO: 13 % (ref 14–44)
MCH RBC QN AUTO: 31.9 PG (ref 26.8–34.3)
MCHC RBC AUTO-ENTMCNC: 33.9 G/DL (ref 31.4–37.4)
MCV RBC AUTO: 94 FL (ref 82–98)
MONOCYTES # BLD AUTO: 0.47 THOUSAND/ΜL (ref 0.17–1.22)
MONOCYTES NFR BLD AUTO: 14 % (ref 4–12)
NEUTROPHILS # BLD AUTO: 2.24 THOUSANDS/ΜL (ref 1.85–7.62)
NEUTS SEG NFR BLD AUTO: 67 % (ref 43–75)
NRBC BLD AUTO-RTO: 0 /100 WBCS
PLATELET # BLD AUTO: 146 THOUSANDS/UL (ref 149–390)
PMV BLD AUTO: 10.6 FL (ref 8.9–12.7)
POTASSIUM SERPL-SCNC: 3.7 MMOL/L (ref 3.5–5.3)
PROT SERPL-MCNC: 7 G/DL (ref 6.4–8.4)
RBC # BLD AUTO: 4.83 MILLION/UL (ref 3.88–5.62)
SODIUM SERPL-SCNC: 140 MMOL/L (ref 135–147)
T3FREE SERPL-MCNC: 2.95 PG/ML (ref 2.5–3.9)
TSH SERPL DL<=0.05 MIU/L-ACNC: 1.77 UIU/ML (ref 0.45–4.5)
WBC # BLD AUTO: 3.38 THOUSAND/UL (ref 4.31–10.16)

## 2024-09-16 PROCEDURE — 84443 ASSAY THYROID STIM HORMONE: CPT | Performed by: INTERNAL MEDICINE

## 2024-09-16 PROCEDURE — 85025 COMPLETE CBC W/AUTO DIFF WBC: CPT | Performed by: INTERNAL MEDICINE

## 2024-09-16 PROCEDURE — 80053 COMPREHEN METABOLIC PANEL: CPT | Performed by: INTERNAL MEDICINE

## 2024-09-16 PROCEDURE — 84481 FREE ASSAY (FT-3): CPT | Performed by: INTERNAL MEDICINE

## 2024-09-16 NOTE — PROGRESS NOTES
Pt's port accessed using sterile technique. Labs drawn without difficulty. Port de-accessed. Band-aid applied. Pt ambulated with a steady gait off unit. Declined AVS   / @   Aware of next appt 09/18/24 @  0830 am

## 2024-09-18 ENCOUNTER — HOSPITAL ENCOUNTER (OUTPATIENT)
Dept: INFUSION CENTER | Facility: HOSPITAL | Age: 79
Discharge: HOME | End: 2024-09-18
Attending: INTERNAL MEDICINE
Payer: MEDICARE

## 2024-09-18 VITALS
TEMPERATURE: 97.4 F | SYSTOLIC BLOOD PRESSURE: 131 MMHG | WEIGHT: 252.43 LBS | OXYGEN SATURATION: 95 % | HEART RATE: 82 BPM | DIASTOLIC BLOOD PRESSURE: 67 MMHG | RESPIRATION RATE: 18 BRPM | HEIGHT: 72 IN | BODY MASS INDEX: 34.19 KG/M2

## 2024-09-18 DIAGNOSIS — C34.90 NSCLC METASTATIC TO BONE (HCC): Primary | ICD-10-CM

## 2024-09-18 DIAGNOSIS — C79.51 NSCLC METASTATIC TO BONE (HCC): Primary | ICD-10-CM

## 2024-09-18 PROCEDURE — 96413 CHEMO IV INFUSION 1 HR: CPT

## 2024-09-18 RX ORDER — SODIUM CHLORIDE 9 MG/ML
20 INJECTION, SOLUTION INTRAVENOUS ONCE
Status: COMPLETED | OUTPATIENT
Start: 2024-09-18 | End: 2024-09-18

## 2024-09-18 RX ADMIN — SODIUM CHLORIDE 200 MG: 9 INJECTION, SOLUTION INTRAVENOUS at 08:56

## 2024-09-18 RX ADMIN — SODIUM CHLORIDE 20 ML/HR: 9 INJECTION, SOLUTION INTRAVENOUS at 08:55

## 2024-09-18 NOTE — PROGRESS NOTES
Octavio Atkins  tolerated treatment well with no complications.      Octavio Atkins is aware of future appt on 10/7/2024 at 08:00 AM.     AVS printed and given to Octavio Atkins:  No (Declined by Octavio Atkins)

## 2024-09-20 ENCOUNTER — TELEPHONE (OUTPATIENT)
Age: 79
End: 2024-09-20

## 2024-09-20 NOTE — TELEPHONE ENCOUNTER
Pt's appt needs to be rescheduled d/t provider not being in the office. Attempted to reach patient about need of appointment change with no success. Appointment canceled and detailed VM left with [facility] number [phone] for patient to return call to reschedule.     Additional message sent Via [employer].

## 2024-10-07 ENCOUNTER — HOSPITAL ENCOUNTER (OUTPATIENT)
Dept: INFUSION CENTER | Facility: HOSPITAL | Age: 79
Discharge: HOME | End: 2024-10-07
Payer: MEDICARE

## 2024-10-07 DIAGNOSIS — C79.51 NSCLC METASTATIC TO BONE (HCC): ICD-10-CM

## 2024-10-07 DIAGNOSIS — C34.90 NSCLC METASTATIC TO BONE (HCC): Primary | ICD-10-CM

## 2024-10-07 DIAGNOSIS — C79.51 NSCLC METASTATIC TO BONE (HCC): Primary | ICD-10-CM

## 2024-10-07 DIAGNOSIS — E87.6 HYPOKALEMIA: ICD-10-CM

## 2024-10-07 DIAGNOSIS — C34.90 NSCLC METASTATIC TO BONE (HCC): ICD-10-CM

## 2024-10-07 LAB
ALBUMIN SERPL BCG-MCNC: 3.6 G/DL (ref 3.5–5)
ALP SERPL-CCNC: 97 U/L (ref 34–104)
ALT SERPL W P-5'-P-CCNC: 31 U/L (ref 7–52)
ANION GAP SERPL CALCULATED.3IONS-SCNC: 5 MMOL/L (ref 4–13)
AST SERPL W P-5'-P-CCNC: 33 U/L (ref 13–39)
BASOPHILS # BLD AUTO: 0.03 THOUSANDS/ΜL (ref 0–0.1)
BASOPHILS NFR BLD AUTO: 1 % (ref 0–1)
BILIRUB SERPL-MCNC: 0.84 MG/DL (ref 0.2–1)
BUN SERPL-MCNC: 19 MG/DL (ref 5–25)
CALCIUM SERPL-MCNC: 8.7 MG/DL (ref 8.4–10.2)
CHLORIDE SERPL-SCNC: 106 MMOL/L (ref 96–108)
CO2 SERPL-SCNC: 30 MMOL/L (ref 21–32)
CREAT SERPL-MCNC: 0.82 MG/DL (ref 0.6–1.3)
EOSINOPHIL # BLD AUTO: 0.18 THOUSAND/ΜL (ref 0–0.61)
EOSINOPHIL NFR BLD AUTO: 5 % (ref 0–6)
ERYTHROCYTE [DISTWIDTH] IN BLOOD BY AUTOMATED COUNT: 14.5 % (ref 11.6–15.1)
GFR SERPL CREATININE-BSD FRML MDRD: 83 ML/MIN/1.73SQ M
GLUCOSE SERPL-MCNC: 144 MG/DL (ref 65–140)
HCT VFR BLD AUTO: 44.6 % (ref 36.5–49.3)
HGB BLD-MCNC: 15.3 G/DL (ref 12–17)
IMM GRANULOCYTES # BLD AUTO: 0.02 THOUSAND/UL (ref 0–0.2)
IMM GRANULOCYTES NFR BLD AUTO: 1 % (ref 0–2)
LYMPHOCYTES # BLD AUTO: 0.49 THOUSANDS/ΜL (ref 0.6–4.47)
LYMPHOCYTES NFR BLD AUTO: 13 % (ref 14–44)
MCH RBC QN AUTO: 32.3 PG (ref 26.8–34.3)
MCHC RBC AUTO-ENTMCNC: 34.3 G/DL (ref 31.4–37.4)
MCV RBC AUTO: 94 FL (ref 82–98)
MONOCYTES # BLD AUTO: 0.55 THOUSAND/ΜL (ref 0.17–1.22)
MONOCYTES NFR BLD AUTO: 14 % (ref 4–12)
NEUTROPHILS # BLD AUTO: 2.55 THOUSANDS/ΜL (ref 1.85–7.62)
NEUTS SEG NFR BLD AUTO: 66 % (ref 43–75)
NRBC BLD AUTO-RTO: 0 /100 WBCS
PLATELET # BLD AUTO: 148 THOUSANDS/UL (ref 149–390)
PMV BLD AUTO: 11.2 FL (ref 8.9–12.7)
POTASSIUM SERPL-SCNC: 3.6 MMOL/L (ref 3.5–5.3)
PROT SERPL-MCNC: 6.8 G/DL (ref 6.4–8.4)
RBC # BLD AUTO: 4.74 MILLION/UL (ref 3.88–5.62)
SODIUM SERPL-SCNC: 141 MMOL/L (ref 135–147)
T3FREE SERPL-MCNC: 3.5 PG/ML (ref 2.5–3.9)
TSH SERPL DL<=0.05 MIU/L-ACNC: 2.02 UIU/ML (ref 0.45–4.5)
WBC # BLD AUTO: 3.82 THOUSAND/UL (ref 4.31–10.16)

## 2024-10-07 PROCEDURE — 84481 FREE ASSAY (FT-3): CPT | Performed by: INTERNAL MEDICINE

## 2024-10-07 PROCEDURE — 84443 ASSAY THYROID STIM HORMONE: CPT | Performed by: INTERNAL MEDICINE

## 2024-10-07 PROCEDURE — 80053 COMPREHEN METABOLIC PANEL: CPT | Performed by: INTERNAL MEDICINE

## 2024-10-07 PROCEDURE — 85025 COMPLETE CBC W/AUTO DIFF WBC: CPT | Performed by: INTERNAL MEDICINE

## 2024-10-07 RX ORDER — POTASSIUM CHLORIDE 1500 MG/1
20 TABLET, EXTENDED RELEASE ORAL DAILY
Qty: 90 TABLET | Refills: 1 | Status: SHIPPED | OUTPATIENT
Start: 2024-10-07

## 2024-10-07 NOTE — PROGRESS NOTES
Octavio Atkins  tolerated treatment well with no complications.      Octavio Atkins is aware of future appt on 10/09/2024 at 0830.     AVS printed and given to Octavio Atkins:    No (Declined by Octavio Atkins)

## 2024-10-07 NOTE — TELEPHONE ENCOUNTER
Reason for call:   [x] Refill   [] Prior Auth  [] Other:     Office:   [x] Specialty/Provider - Hem Onc / Agostino    Medication: potassium chloride     Dose/Frequency: 20mEq qd    Quantity: 90    Pharmacy: [facility] - [place] - [address]     Does the patient have enough for 3 days?   [x] Yes   [] No - Send as HP to POD

## 2024-10-09 ENCOUNTER — HOSPITAL ENCOUNTER (OUTPATIENT)
Dept: INFUSION CENTER | Facility: HOSPITAL | Age: 79
Discharge: HOME | End: 2024-10-09
Attending: INTERNAL MEDICINE
Payer: MEDICARE

## 2024-10-09 VITALS
OXYGEN SATURATION: 95 % | DIASTOLIC BLOOD PRESSURE: 75 MMHG | TEMPERATURE: 98.2 F | HEART RATE: 76 BPM | SYSTOLIC BLOOD PRESSURE: 128 MMHG | BODY MASS INDEX: 34.61 KG/M2 | HEIGHT: 72 IN | RESPIRATION RATE: 18 BRPM

## 2024-10-09 DIAGNOSIS — C79.51 NSCLC METASTATIC TO BONE (HCC): Primary | ICD-10-CM

## 2024-10-09 DIAGNOSIS — C34.90 NSCLC METASTATIC TO BONE (HCC): Primary | ICD-10-CM

## 2024-10-09 PROCEDURE — 96413 CHEMO IV INFUSION 1 HR: CPT

## 2024-10-09 RX ORDER — SODIUM CHLORIDE 9 MG/ML
20 INJECTION, SOLUTION INTRAVENOUS ONCE
Status: COMPLETED | OUTPATIENT
Start: 2024-10-09 | End: 2024-10-09

## 2024-10-09 RX ADMIN — SODIUM CHLORIDE 200 MG: 9 INJECTION, SOLUTION INTRAVENOUS at 09:21

## 2024-10-09 RX ADMIN — SODIUM CHLORIDE 20 ML/HR: 9 INJECTION, SOLUTION INTRAVENOUS at 08:32

## 2024-10-09 NOTE — PROGRESS NOTES
Octavio Atkins  tolerated treatment well with no complications.      Octavio Atkins is aware of future appt on 10/28 at 0800.     AVS printed and given to Octavio Atkins:    No (Declined by Octavio Atkins)

## 2024-10-09 NOTE — PLAN OF CARE
Doing well overall.  No bleeding, no regular ctx. More like menstrual cramps at times.   No HA/vision change/n/v.    Working hard on quitting tobacco. Wants to be done by 38 weeks, currently using about 3-4 pouches per day.    Encouraged to develop a plan and not just hope to be done. Will follow up next visit on her progress.   Cervix check today, not yet favorable.   She is doing well with managing her anxiety but feels strongly that she wants to go on medication quickly after delivery, worried about postpartum depression/anxiety.   She plans to breastfeed baby.   Previoulsy on lexapro, hydroxyzine and trazodone. Trazodone may be used in breastfeeding mothers but lexapro is not preferred due to its higher presence in breastmilk. However, we will have discussion of pros and cons of treatment weighing the risks of untreated anxiety on maternal and infant health.   Also hydroxyzine is not preferred in breastfeeding due to sedative and antihistaminic effects on milk supply. May recommend trial of sertraline instead, which has more data on safety in breastfeeding.   She has never used sertaline to her knowledge and is open to trying that.   She had her Tdap and flu shots and RSV.   GBS negative.   Discussed when to present for signs and symptoms of labor.   Discussed warning signs for preeclampsia.  Will f/u in 1 week(s) or sooner with any concern for decreased fetal movement, vaginal bleeding, fluid leak, headache, vision change, severe nausea or vomiting, abdominal pain, increased edema or other concerns.   Janki Vasquez MD    Problem: DISCHARGE PLANNING  Goal: Discharge to home or other facility with appropriate resources  Description: INTERVENTIONS:  - Identify barriers to discharge w/patient and caregiver  - Arrange for needed discharge resources and transportation as appropriate  - Identify discharge learning needs (meds, wound care, etc )  - Arrange for interpretive services to assist at discharge as needed  - Refer to Case Management Department for coordinating discharge planning if the patient needs post-hospital services based on physician/advanced practitioner order or complex needs related to functional status, cognitive ability, or social support system  Outcome: Progressing     Problem: Knowledge Deficit  Goal: Patient/family/caregiver demonstrates understanding of disease process, treatment plan, medications, and discharge instructions  Description: Complete learning assessment and assess knowledge base    Interventions:  - Provide teaching at level of understanding  - Provide teaching via preferred learning methods  Outcome: Progressing

## 2024-10-19 NOTE — PROGRESS NOTES
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)   3/1/2018 -  Cancer Staged    Staging form: Lung, AJCC 8th Edition  - Clinical stage from 3/1/2018: Stage IVB (cTX, cN2, cM1c) - Signed by Ally Lee DO on 2022 Initial Diagnosis    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)     2022 - 2022 Chemotherapy    pembrolizumab (KEYTRUDA) IVPB, 200 mg, Intravenous, Once, 10 of 13 cycles  Administration: 200 mg (2022), 200 mg (6/15/2022), 200 mg (2022), 200 mg (2022), 200 mg (2022), 200 mg (2022), 200 mg (2022), 200 mg (10/19/2022), 200 mg (2022), 200 mg (2022)     2022 - 2022 Chemotherapy    pembrolizumab (KEYTRUDA) IVPB, 400 mg, Intravenous, Once, 1 of 6 cycles     2022 -  Chemotherapy    alteplase (CATHFLO), 2 mg, Intracatheter, Every 2 hour PRN, 31 of 37 cycles  pembrolizumab (KEYTRUDA) IVPB, 200 mg, Intravenous, Once, 31 of 37 cycles  Administration: 200 mg (2022), 200 mg (2023), 200 mg (2023), 200 mg (2023), 200 mg (3/15/2023), 200 mg (2023), 200 mg (2023), 200 mg (2023), 200 mg (2023), 200 mg (2023), 200 mg (2023), 200 mg (2023), 200 mg (2023), 200 mg (2023), 200 mg (10/11/2023), 200 mg (2023), 200 mg (2023), 200 mg (2023), 200 mg (1/3/2024), 200 mg (2024), 200 mg (3/14/2024), 200 mg (4/3/2024), 200 mg (2024), 200 mg (5/15/2024), 200 mg (2024), 200 mg  (6/26/2024), 200 mg (7/17/2024), 200 mg (8/7/2024), 200 mg (8/28/2024), 200 mg (9/18/2024), 200 mg (10/9/2024)         Interval History: Patient returns for follow-up visit.  He continues to receive Keytruda every 3 weeks for his stage IV non-small cell lung cancer.  He tolerates this well.  Blood work remains within normal range.  He denies any adverse effects from Keytruda.  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  Remains active, currently working on restoring a truck    REVIEW OF SYSTEMS:  Please note that a 14-point review of systems was performed to include Constitutional, HEENT, Respiratory, CVS, GI, , Musculoskeletal, Integumentary, Neurologic, Rheumatologic, Endocrinologic, Psychiatric, Lymphatic, and Hematologic/Oncologic systems were reviewed and are negative unless otherwise stated in HPI. Positive and negative findings pertinent to this evaluation are incorporated into the history of present illness.      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  He has been on maintenance Keytruda for almost 5 years and tolerates this very well.  He does not have any evidence of autoimmune complications and wishes to continue on treatment at this time.  He will be due for surveillance imaging in January.  PET CT scan is  requested today.  He will continue on Eliquis for his history of DVT.    Patient will continue on his current regimen without change.  He will return for a follow-up visit in 3 months with Dr. Lee to review results of PET CT scan.  He is aware to call anytime with questions or concerns at any time      I spent 30 minutes on chart review, face to face counseling time, coordination of care and documentation.    Past Medical History:   has a past medical history of DVT of deep femoral vein, left (HCC) (04/18/2023).    PAST SURGICAL HISTORY:   has a past surgical history that includes Elbow surgery (Left, 2004).    CURRENT MEDICATIONS  Current Outpatient Medications   Medication Sig Dispense Refill    apixaban (Eliquis) 5 mg Take 1 tablet (5 mg total) by mouth daily 60 tablet 11    Calcium Carb-Cholecalciferol (CALCIUM 1000 + D PO) Take by mouth      Cholecalciferol (Vitamin D3) 1.25 MG (71265 UT) CAPS Take by mouth      Contour Next Test test strip USE 1 STRIP TO CHECK GLUCOSE ONCE DAILY 100 each 1    dorzolamide (TRUSOPT) 2 % ophthalmic solution       dorzolamide-timolol (COSOPT) 22.3-6.8 MG/ML ophthalmic solution INSTILL 1 DROP INTO EACH EYE TWICE DAILY      glipiZIDE (GLUCOTROL XL) 2.5 mg 24 hr tablet Take 1 tablet by mouth once daily 100 tablet 1    hydroCHLOROthiazide 25 mg tablet Take 1 tablet by mouth once daily 90 tablet 1    Lancet Devices (Microlet Next Lancing Device) MISC USE ONCE DAILY IN THE MORNING TO TEST BLOOD SUGAR 1 each 0    lisinopril (ZESTRIL) 20 mg tablet Take 1 tablet by mouth once daily 90 tablet 1    lovastatin (MEVACOR) 20 mg tablet Take 1 tablet by mouth once daily 90 tablet 1    methocarbamol (ROBAXIN) 500 mg tablet Take 1 tablet (500 mg total) by mouth 3 (three) times a day 20 tablet 0    Microlet Lancets MIS USE 1 NEW LANCET TO CHECK GLUCOSE ONCE DAILY 100 each 1    mupirocin (BACTROBAN) 2 % ointment Apply topically 3 (three) times a day as needed (skin infection)      potassium  "chloride (Klor-Con M20) 20 mEq tablet Take 1 tablet (20 mEq total) by mouth daily 90 tablet 1     No current facility-administered medications for this visit.     [unfilled]    SOCIAL HISTORY:   reports that he quit smoking about 18 years ago. His smoking use included cigarettes. He started smoking about 67 years ago. He has a 50 pack-year smoking history. He has never used smokeless tobacco. He reports current alcohol use of about 21.0 standard drinks of alcohol per week. He reports that he does not use drugs.     FAMILY HISTORY:  family history includes Colon cancer in his father.     ALLERGIES:  is allergic to aspirin and ibuprofen.      Physical Exam:  Vital Signs:   Visit Vitals  /72 (BP Location: Right arm, Patient Position: Sitting, Cuff Size: Adult)   Pulse 85   Temp (!) 97.4 °F (36.3 °C) (Temporal)   Resp 18   Ht 5' 11\" (1.803 m)   Wt 113 kg (249 lb)   SpO2 97%   BMI 34.73 kg/m²   Smoking Status Former   BSA 2.31 m²     Body mass index is 34.73 kg/m².  Body surface area is 2.32 meters squared.    Physical Exam  Constitutional:       General: He is not in acute distress.     Appearance: Normal appearance.   HENT:      Head: Normocephalic and atraumatic.   Eyes:      General: No scleral icterus.        Right eye: No discharge.         Left eye: No discharge.      Conjunctiva/sclera: Conjunctivae normal.   Cardiovascular:      Rate and Rhythm: Normal rate and regular rhythm.   Pulmonary:      Effort: Pulmonary effort is normal. No respiratory distress.      Breath sounds: Normal breath sounds.   Abdominal:      General: Bowel sounds are normal. There is no distension.      Palpations: Abdomen is soft. There is no mass.      Tenderness: There is no abdominal tenderness.   Musculoskeletal:         General: Normal range of motion.   Lymphadenopathy:      Cervical: No cervical adenopathy.      Upper Body:      Right upper body: No supraclavicular, axillary or pectoral adenopathy.      Left upper body: No " supraclavicular, axillary or pectoral adenopathy.   Skin:     General: Skin is warm and dry.   Neurological:      General: No focal deficit present.      Mental Status: He is alert and oriented to person, place, and time.   Psychiatric:         Mood and Affect: Mood normal.         Behavior: Behavior normal.         Labs:  Lab Results   Component Value Date    WBC 3.82 (L) 10/07/2024    HGB 15.3 10/07/2024    HCT 44.6 10/07/2024    MCV 94 10/07/2024     (L) 10/07/2024     Lab Results   Component Value Date    SODIUM 141 10/07/2024    K 3.6 10/07/2024     10/07/2024    CO2 30 10/07/2024    AGAP 5 10/07/2024    BUN 19 10/07/2024    CREATININE 0.82 10/07/2024    GLUC 144 (H) 10/07/2024    GLUF 188 (H) 01/30/2023    CALCIUM 8.7 10/07/2024    AST 33 10/07/2024    ALT 31 10/07/2024    ALKPHOS 97 10/07/2024    TP 6.8 10/07/2024    TBILI 0.84 10/07/2024    EGFR 83 10/07/2024

## 2024-10-21 ENCOUNTER — OFFICE VISIT (OUTPATIENT)
Age: 79
End: 2024-10-21
Payer: MEDICARE

## 2024-10-21 VITALS
HEART RATE: 85 BPM | OXYGEN SATURATION: 97 % | BODY MASS INDEX: 34.86 KG/M2 | RESPIRATION RATE: 18 BRPM | TEMPERATURE: 97.4 F | HEIGHT: 71 IN | WEIGHT: 249 LBS | DIASTOLIC BLOOD PRESSURE: 72 MMHG | SYSTOLIC BLOOD PRESSURE: 135 MMHG

## 2024-10-21 DIAGNOSIS — C79.51 NSCLC METASTATIC TO BONE (HCC): Primary | ICD-10-CM

## 2024-10-21 DIAGNOSIS — C34.90 NSCLC METASTATIC TO BONE (HCC): Primary | ICD-10-CM

## 2024-10-21 PROCEDURE — 99214 OFFICE O/P EST MOD 30 MIN: CPT | Performed by: NURSE PRACTITIONER

## 2024-10-23 DIAGNOSIS — L73.9 FOLLICULITIS: ICD-10-CM

## 2024-10-23 NOTE — TELEPHONE ENCOUNTER
Medication: Mupirocin    Dose/Frequency: 2% apply 3 times day PRN    Quantity: as directed    Pharmacy: [facility]    Office:   [x] PCP/Provider -   [] Speciality/Provider -     Does the patient have enough for 3 days?   [x] Yes   [] No - Send as HP to POD

## 2024-10-24 RX ORDER — MUPIROCIN 20 MG/G
OINTMENT TOPICAL 3 TIMES DAILY PRN
Qty: 1 G | Refills: 1 | Status: SHIPPED | OUTPATIENT
Start: 2024-10-24

## 2024-10-28 ENCOUNTER — HOSPITAL ENCOUNTER (OUTPATIENT)
Dept: INFUSION CENTER | Facility: HOSPITAL | Age: 79
Discharge: HOME | End: 2024-10-28
Payer: MEDICARE

## 2024-10-28 DIAGNOSIS — I10 ESSENTIAL HYPERTENSION: ICD-10-CM

## 2024-10-28 DIAGNOSIS — C79.51 NSCLC METASTATIC TO BONE (HCC): Primary | ICD-10-CM

## 2024-10-28 DIAGNOSIS — C34.90 NSCLC METASTATIC TO BONE (HCC): Primary | ICD-10-CM

## 2024-10-28 LAB
ALBUMIN SERPL BCG-MCNC: 3.6 G/DL (ref 3.5–5)
ALP SERPL-CCNC: 94 U/L (ref 34–104)
ALT SERPL W P-5'-P-CCNC: 25 U/L (ref 7–52)
ANION GAP SERPL CALCULATED.3IONS-SCNC: 6 MMOL/L (ref 4–13)
AST SERPL W P-5'-P-CCNC: 28 U/L (ref 13–39)
BASOPHILS # BLD AUTO: 0.04 THOUSANDS/ΜL (ref 0–0.1)
BASOPHILS NFR BLD AUTO: 1 % (ref 0–1)
BILIRUB SERPL-MCNC: 0.69 MG/DL (ref 0.2–1)
BUN SERPL-MCNC: 16 MG/DL (ref 5–25)
CALCIUM SERPL-MCNC: 8.6 MG/DL (ref 8.4–10.2)
CHLORIDE SERPL-SCNC: 106 MMOL/L (ref 96–108)
CO2 SERPL-SCNC: 28 MMOL/L (ref 21–32)
CREAT SERPL-MCNC: 0.82 MG/DL (ref 0.6–1.3)
EOSINOPHIL # BLD AUTO: 0.18 THOUSAND/ΜL (ref 0–0.61)
EOSINOPHIL NFR BLD AUTO: 5 % (ref 0–6)
ERYTHROCYTE [DISTWIDTH] IN BLOOD BY AUTOMATED COUNT: 14.3 % (ref 11.6–15.1)
GFR SERPL CREATININE-BSD FRML MDRD: 83 ML/MIN/1.73SQ M
GLUCOSE SERPL-MCNC: 125 MG/DL (ref 65–140)
HCT VFR BLD AUTO: 45.3 % (ref 36.5–49.3)
HGB BLD-MCNC: 15.2 G/DL (ref 12–17)
IMM GRANULOCYTES # BLD AUTO: 0.02 THOUSAND/UL (ref 0–0.2)
IMM GRANULOCYTES NFR BLD AUTO: 1 % (ref 0–2)
LYMPHOCYTES # BLD AUTO: 0.44 THOUSANDS/ΜL (ref 0.6–4.47)
LYMPHOCYTES NFR BLD AUTO: 12 % (ref 14–44)
MCH RBC QN AUTO: 31.7 PG (ref 26.8–34.3)
MCHC RBC AUTO-ENTMCNC: 33.6 G/DL (ref 31.4–37.4)
MCV RBC AUTO: 94 FL (ref 82–98)
MONOCYTES # BLD AUTO: 0.5 THOUSAND/ΜL (ref 0.17–1.22)
MONOCYTES NFR BLD AUTO: 14 % (ref 4–12)
NEUTROPHILS # BLD AUTO: 2.46 THOUSANDS/ΜL (ref 1.85–7.62)
NEUTS SEG NFR BLD AUTO: 67 % (ref 43–75)
NRBC BLD AUTO-RTO: 0 /100 WBCS
PLATELET # BLD AUTO: 173 THOUSANDS/UL (ref 149–390)
PMV BLD AUTO: 10.8 FL (ref 8.9–12.7)
POTASSIUM SERPL-SCNC: 3.8 MMOL/L (ref 3.5–5.3)
PROT SERPL-MCNC: 6.8 G/DL (ref 6.4–8.4)
RBC # BLD AUTO: 4.8 MILLION/UL (ref 3.88–5.62)
SODIUM SERPL-SCNC: 140 MMOL/L (ref 135–147)
T3FREE SERPL-MCNC: 3.21 PG/ML (ref 2.5–3.9)
TSH SERPL DL<=0.05 MIU/L-ACNC: 1.46 UIU/ML (ref 0.45–4.5)
WBC # BLD AUTO: 3.64 THOUSAND/UL (ref 4.31–10.16)

## 2024-10-28 PROCEDURE — 80053 COMPREHEN METABOLIC PANEL: CPT | Performed by: INTERNAL MEDICINE

## 2024-10-28 PROCEDURE — 84481 FREE ASSAY (FT-3): CPT | Performed by: INTERNAL MEDICINE

## 2024-10-28 PROCEDURE — 84443 ASSAY THYROID STIM HORMONE: CPT | Performed by: INTERNAL MEDICINE

## 2024-10-28 PROCEDURE — 85025 COMPLETE CBC W/AUTO DIFF WBC: CPT | Performed by: INTERNAL MEDICINE

## 2024-10-28 RX ORDER — LISINOPRIL 20 MG/1
20 TABLET ORAL DAILY
Qty: 90 TABLET | Refills: 1 | Status: SHIPPED | OUTPATIENT
Start: 2024-10-28 | End: 2024-10-28 | Stop reason: SDUPTHER

## 2024-10-28 RX ORDER — LISINOPRIL 20 MG/1
20 TABLET ORAL DAILY
Qty: 90 TABLET | Refills: 1 | Status: SHIPPED | OUTPATIENT
Start: 2024-10-28

## 2024-10-28 RX ORDER — HYDROCHLOROTHIAZIDE 25 MG/1
25 TABLET ORAL DAILY
Qty: 90 TABLET | Refills: 1 | Status: SHIPPED | OUTPATIENT
Start: 2024-10-28 | End: 2024-10-28 | Stop reason: SDUPTHER

## 2024-10-28 RX ORDER — HYDROCHLOROTHIAZIDE 25 MG/1
25 TABLET ORAL DAILY
Qty: 90 TABLET | Refills: 1 | Status: SHIPPED | OUTPATIENT
Start: 2024-10-28

## 2024-10-28 NOTE — PROGRESS NOTES
Octavio Atkins  tolerated treatment well with no complications.      Octavio Atkins is aware of future appt on 10/30/24 at 0830.     AVS printed and given to Octavio Atkins:  No (Declined by Octavio Atkins)

## 2024-10-28 NOTE — TELEPHONE ENCOUNTER
Pharmacy needs new prescription DR garcia no longer at practice. Patient is still with office will be DR Arevalo patient please send 90 day supply for insurance to cover     Reason for call:   [x] Refill   [] Prior Auth  [] Other:     Office:   [x] PCP/Provider - Catherine Arevalo  [] Specialty/Provider -     Medication: Hydrochlorothiazide   Dose/Frequency: 25 mg Daily   Quantity: 90    Medication: Lisinopril  Dose/Frequency: 20 mg Daily   Quantity:90     Pharmacy: [facility] [place] [facility]    Does the patient have enough for 3 days?   [] Yes   [x] No - Send as HP to POD

## 2024-10-30 ENCOUNTER — HOSPITAL ENCOUNTER (OUTPATIENT)
Dept: INFUSION CENTER | Facility: HOSPITAL | Age: 79
Discharge: HOME | End: 2024-10-30
Attending: INTERNAL MEDICINE
Payer: MEDICARE

## 2024-10-30 VITALS
BODY MASS INDEX: 34.73 KG/M2 | DIASTOLIC BLOOD PRESSURE: 76 MMHG | HEIGHT: 71 IN | TEMPERATURE: 98 F | SYSTOLIC BLOOD PRESSURE: 145 MMHG | OXYGEN SATURATION: 95 % | RESPIRATION RATE: 16 BRPM | HEART RATE: 88 BPM

## 2024-10-30 DIAGNOSIS — C34.90 NSCLC METASTATIC TO BONE (HCC): Primary | ICD-10-CM

## 2024-10-30 DIAGNOSIS — C79.51 NSCLC METASTATIC TO BONE (HCC): Primary | ICD-10-CM

## 2024-10-30 PROCEDURE — 96413 CHEMO IV INFUSION 1 HR: CPT

## 2024-10-30 RX ORDER — SODIUM CHLORIDE 9 MG/ML
20 INJECTION, SOLUTION INTRAVENOUS ONCE
Status: COMPLETED | OUTPATIENT
Start: 2024-10-30 | End: 2024-10-30

## 2024-10-30 RX ADMIN — SODIUM CHLORIDE 200 MG: 9 INJECTION, SOLUTION INTRAVENOUS at 08:57

## 2024-10-30 RX ADMIN — SODIUM CHLORIDE 20 ML/HR: 9 INJECTION, SOLUTION INTRAVENOUS at 08:57

## 2024-10-30 NOTE — PROGRESS NOTES
Octavio Atkins  tolerated treatment well with no complications.      Octavio Atkins is aware of future appt on 11//18/2024 at 0900.     AVS printed and given to Octavio Atkins:   No (Declined by Octavio Atkins)

## 2024-11-13 NOTE — PROGRESS NOTES
Pt here for port flush and labs  Labs obtained without difficulty, port deaccessed and ba placed  Pt aware of next appt and left unit ambulatory with cane and steady gait  details… normal/cranial nerves II-XII intact/sensation intact

## 2024-11-18 ENCOUNTER — HOSPITAL ENCOUNTER (OUTPATIENT)
Dept: INFUSION CENTER | Facility: HOSPITAL | Age: 79
Discharge: HOME | End: 2024-11-18
Payer: MEDICARE

## 2024-11-18 DIAGNOSIS — C34.90 NSCLC METASTATIC TO BONE (HCC): Primary | ICD-10-CM

## 2024-11-18 DIAGNOSIS — C79.51 NSCLC METASTATIC TO BONE (HCC): Primary | ICD-10-CM

## 2024-11-18 LAB
ALBUMIN SERPL BCG-MCNC: 3.6 G/DL (ref 3.5–5)
ALP SERPL-CCNC: 97 U/L (ref 34–104)
ALT SERPL W P-5'-P-CCNC: 27 U/L (ref 7–52)
ANION GAP SERPL CALCULATED.3IONS-SCNC: 5 MMOL/L (ref 4–13)
AST SERPL W P-5'-P-CCNC: 26 U/L (ref 13–39)
BASOPHILS # BLD AUTO: 0.06 THOUSANDS/ÂΜL (ref 0–0.1)
BASOPHILS NFR BLD AUTO: 2 % (ref 0–1)
BILIRUB SERPL-MCNC: 1.06 MG/DL (ref 0.2–1)
BUN SERPL-MCNC: 18 MG/DL (ref 5–25)
CALCIUM SERPL-MCNC: 8.5 MG/DL (ref 8.4–10.2)
CHLORIDE SERPL-SCNC: 106 MMOL/L (ref 96–108)
CO2 SERPL-SCNC: 28 MMOL/L (ref 21–32)
CREAT SERPL-MCNC: 0.84 MG/DL (ref 0.6–1.3)
EOSINOPHIL # BLD AUTO: 0.19 THOUSAND/ÂΜL (ref 0–0.61)
EOSINOPHIL NFR BLD AUTO: 5 % (ref 0–6)
ERYTHROCYTE [DISTWIDTH] IN BLOOD BY AUTOMATED COUNT: 14.1 % (ref 11.6–15.1)
GFR SERPL CREATININE-BSD FRML MDRD: 82 ML/MIN/1.73SQ M
GLUCOSE SERPL-MCNC: 147 MG/DL (ref 65–140)
HCT VFR BLD AUTO: 47.1 % (ref 36.5–49.3)
HGB BLD-MCNC: 15.6 G/DL (ref 12–17)
IMM GRANULOCYTES # BLD AUTO: 0.02 THOUSAND/UL (ref 0–0.2)
IMM GRANULOCYTES NFR BLD AUTO: 1 % (ref 0–2)
LYMPHOCYTES # BLD AUTO: 0.43 THOUSANDS/ÂΜL (ref 0.6–4.47)
LYMPHOCYTES NFR BLD AUTO: 12 % (ref 14–44)
MCH RBC QN AUTO: 31.5 PG (ref 26.8–34.3)
MCHC RBC AUTO-ENTMCNC: 33.1 G/DL (ref 31.4–37.4)
MCV RBC AUTO: 95 FL (ref 82–98)
MONOCYTES # BLD AUTO: 0.42 THOUSAND/ÂΜL (ref 0.17–1.22)
MONOCYTES NFR BLD AUTO: 12 % (ref 4–12)
NEUTROPHILS # BLD AUTO: 2.4 THOUSANDS/ÂΜL (ref 1.85–7.62)
NEUTS SEG NFR BLD AUTO: 68 % (ref 43–75)
NRBC BLD AUTO-RTO: 0 /100 WBCS
PLATELET # BLD AUTO: 151 THOUSANDS/UL (ref 149–390)
PMV BLD AUTO: 11.2 FL (ref 8.9–12.7)
POTASSIUM SERPL-SCNC: 3.6 MMOL/L (ref 3.5–5.3)
PROT SERPL-MCNC: 6.7 G/DL (ref 6.4–8.4)
RBC # BLD AUTO: 4.95 MILLION/UL (ref 3.88–5.62)
SODIUM SERPL-SCNC: 139 MMOL/L (ref 135–147)
T3FREE SERPL-MCNC: 3.08 PG/ML (ref 2.5–3.9)
TSH SERPL DL<=0.05 MIU/L-ACNC: 1.68 UIU/ML (ref 0.45–4.5)
WBC # BLD AUTO: 3.52 THOUSAND/UL (ref 4.31–10.16)

## 2024-11-18 PROCEDURE — 80053 COMPREHEN METABOLIC PANEL: CPT | Performed by: INTERNAL MEDICINE

## 2024-11-18 PROCEDURE — 84443 ASSAY THYROID STIM HORMONE: CPT | Performed by: INTERNAL MEDICINE

## 2024-11-18 PROCEDURE — 85025 COMPLETE CBC W/AUTO DIFF WBC: CPT | Performed by: INTERNAL MEDICINE

## 2024-11-18 PROCEDURE — 84481 FREE ASSAY (FT-3): CPT | Performed by: INTERNAL MEDICINE

## 2024-11-18 NOTE — PROGRESS NOTES
Octavio Atkins  tolerated treatment well with no complications.      Octavio Atkins is aware of future appt on 11/20 at 0830.     AVS printed and given to Octavio Atkins:   No (Declined by Octavio Atkins)

## 2024-11-20 ENCOUNTER — HOSPITAL ENCOUNTER (OUTPATIENT)
Dept: INFUSION CENTER | Facility: HOSPITAL | Age: 79
Discharge: HOME | End: 2024-11-20
Attending: INTERNAL MEDICINE
Payer: MEDICARE

## 2024-11-20 VITALS
HEIGHT: 71 IN | SYSTOLIC BLOOD PRESSURE: 151 MMHG | HEART RATE: 82 BPM | TEMPERATURE: 97.3 F | RESPIRATION RATE: 16 BRPM | DIASTOLIC BLOOD PRESSURE: 79 MMHG | OXYGEN SATURATION: 96 % | BODY MASS INDEX: 34.74 KG/M2

## 2024-11-20 DIAGNOSIS — C79.51 NSCLC METASTATIC TO BONE (HCC): Primary | ICD-10-CM

## 2024-11-20 DIAGNOSIS — C34.90 NSCLC METASTATIC TO BONE (HCC): Primary | ICD-10-CM

## 2024-11-20 PROCEDURE — 96413 CHEMO IV INFUSION 1 HR: CPT

## 2024-11-20 RX ORDER — SODIUM CHLORIDE 9 MG/ML
20 INJECTION, SOLUTION INTRAVENOUS ONCE
Status: COMPLETED | OUTPATIENT
Start: 2024-11-20 | End: 2024-11-20

## 2024-11-20 RX ADMIN — SODIUM CHLORIDE 200 MG: 9 INJECTION, SOLUTION INTRAVENOUS at 09:01

## 2024-11-20 RX ADMIN — SODIUM CHLORIDE 20 ML/HR: 9 INJECTION, SOLUTION INTRAVENOUS at 09:00

## 2024-11-20 NOTE — PROGRESS NOTES
Pt tolerated chemotherapy infusion without any adverse reactions. Port flushed and de-accessed. Pt ambulated out of unit with a steady gait. Declined AVS    Aware of next appt 12/09/24 @ 0930 am

## 2024-12-09 ENCOUNTER — HOSPITAL ENCOUNTER (OUTPATIENT)
Dept: INFUSION CENTER | Facility: HOSPITAL | Age: 79
Discharge: HOME | End: 2024-12-09
Payer: MEDICARE

## 2024-12-09 DIAGNOSIS — C34.90 NSCLC METASTATIC TO BONE (HCC): Primary | ICD-10-CM

## 2024-12-09 DIAGNOSIS — C79.51 NSCLC METASTATIC TO BONE (HCC): Primary | ICD-10-CM

## 2024-12-09 LAB
ALBUMIN SERPL BCG-MCNC: 3.8 G/DL (ref 3.5–5)
ALP SERPL-CCNC: 107 U/L (ref 34–104)
ALT SERPL W P-5'-P-CCNC: 33 U/L (ref 7–52)
ANION GAP SERPL CALCULATED.3IONS-SCNC: 6 MMOL/L (ref 4–13)
AST SERPL W P-5'-P-CCNC: 32 U/L (ref 13–39)
BASOPHILS # BLD AUTO: 0.06 THOUSANDS/ÂΜL (ref 0–0.1)
BASOPHILS NFR BLD AUTO: 1 % (ref 0–1)
BILIRUB SERPL-MCNC: 0.85 MG/DL (ref 0.2–1)
BUN SERPL-MCNC: 19 MG/DL (ref 5–25)
CALCIUM SERPL-MCNC: 8.6 MG/DL (ref 8.4–10.2)
CHLORIDE SERPL-SCNC: 106 MMOL/L (ref 96–108)
CO2 SERPL-SCNC: 29 MMOL/L (ref 21–32)
CREAT SERPL-MCNC: 0.91 MG/DL (ref 0.6–1.3)
EOSINOPHIL # BLD AUTO: 0.19 THOUSAND/ÂΜL (ref 0–0.61)
EOSINOPHIL NFR BLD AUTO: 4 % (ref 0–6)
ERYTHROCYTE [DISTWIDTH] IN BLOOD BY AUTOMATED COUNT: 14 % (ref 11.6–15.1)
GFR SERPL CREATININE-BSD FRML MDRD: 79 ML/MIN/1.73SQ M
GLUCOSE SERPL-MCNC: 167 MG/DL (ref 65–140)
HCT VFR BLD AUTO: 47.2 % (ref 36.5–49.3)
HGB BLD-MCNC: 15.5 G/DL (ref 12–17)
IMM GRANULOCYTES # BLD AUTO: 0.02 THOUSAND/UL (ref 0–0.2)
IMM GRANULOCYTES NFR BLD AUTO: 1 % (ref 0–2)
LYMPHOCYTES # BLD AUTO: 0.51 THOUSANDS/ÂΜL (ref 0.6–4.47)
LYMPHOCYTES NFR BLD AUTO: 12 % (ref 14–44)
MCH RBC QN AUTO: 31.5 PG (ref 26.8–34.3)
MCHC RBC AUTO-ENTMCNC: 32.8 G/DL (ref 31.4–37.4)
MCV RBC AUTO: 96 FL (ref 82–98)
MONOCYTES # BLD AUTO: 0.49 THOUSAND/ÂΜL (ref 0.17–1.22)
MONOCYTES NFR BLD AUTO: 11 % (ref 4–12)
NEUTROPHILS # BLD AUTO: 3.14 THOUSANDS/ÂΜL (ref 1.85–7.62)
NEUTS SEG NFR BLD AUTO: 71 % (ref 43–75)
NRBC BLD AUTO-RTO: 0 /100 WBCS
PLATELET # BLD AUTO: 158 THOUSANDS/UL (ref 149–390)
PMV BLD AUTO: 11.5 FL (ref 8.9–12.7)
POTASSIUM SERPL-SCNC: 3.7 MMOL/L (ref 3.5–5.3)
PROT SERPL-MCNC: 7.2 G/DL (ref 6.4–8.4)
RBC # BLD AUTO: 4.92 MILLION/UL (ref 3.88–5.62)
SODIUM SERPL-SCNC: 141 MMOL/L (ref 135–147)
T3FREE SERPL-MCNC: 4.01 PG/ML (ref 2.5–3.9)
TSH SERPL DL<=0.05 MIU/L-ACNC: 1.58 UIU/ML (ref 0.45–4.5)
WBC # BLD AUTO: 4.41 THOUSAND/UL (ref 4.31–10.16)

## 2024-12-09 PROCEDURE — 80053 COMPREHEN METABOLIC PANEL: CPT | Performed by: INTERNAL MEDICINE

## 2024-12-09 PROCEDURE — 84481 FREE ASSAY (FT-3): CPT | Performed by: INTERNAL MEDICINE

## 2024-12-09 PROCEDURE — 84443 ASSAY THYROID STIM HORMONE: CPT | Performed by: INTERNAL MEDICINE

## 2024-12-09 PROCEDURE — 85025 COMPLETE CBC W/AUTO DIFF WBC: CPT | Performed by: INTERNAL MEDICINE

## 2024-12-09 NOTE — PROGRESS NOTES
Octavio Atkins  tolerated treatment well with no complications.      Octavio Atkins is aware of future appt on 12/11 at 0830.     AVS printed and given to Octavio Atkins:  No (Declined by Octavio Atkins)

## 2024-12-11 ENCOUNTER — HOSPITAL ENCOUNTER (OUTPATIENT)
Dept: INFUSION CENTER | Facility: HOSPITAL | Age: 79
Discharge: HOME | End: 2024-12-11
Attending: INTERNAL MEDICINE

## 2024-12-11 VITALS
RESPIRATION RATE: 16 BRPM | TEMPERATURE: 98.3 F | WEIGHT: 249.34 LBS | BODY MASS INDEX: 34.79 KG/M2 | DIASTOLIC BLOOD PRESSURE: 91 MMHG | SYSTOLIC BLOOD PRESSURE: 149 MMHG | HEART RATE: 82 BPM | OXYGEN SATURATION: 97 %

## 2024-12-11 DIAGNOSIS — C34.90 NSCLC METASTATIC TO BONE (HCC): ICD-10-CM

## 2024-12-11 DIAGNOSIS — C79.51 NSCLC METASTATIC TO BONE (HCC): ICD-10-CM

## 2024-12-11 NOTE — PROGRESS NOTES
Octavio Atkins  arrived for scheduled treatment.  He has ongoing rash, which appears to be getting worse.  No other concerning symptoms. Patient is followed by dermatology, saw them last week and returns again this afternoon.  He has been using a steroid cream. Of particular concern is an area just lateral to his port.  Picture sent to Ary Hargrove RN for review and attached here for reference. Per provider, HOLD treatment today. Patient to return as scheduled in 3 weeks.  Patient in agreement with this plan.          Octavio Atkins is aware of future appt on 12/30/24 at 0900.     AVS printed and given to Octavio Atkins:    No (Declined by Octavio Atkins)

## 2024-12-30 ENCOUNTER — TELEPHONE (OUTPATIENT)
Age: 79
End: 2024-12-30

## 2024-12-30 ENCOUNTER — HOSPITAL ENCOUNTER (OUTPATIENT)
Dept: INFUSION CENTER | Facility: HOSPITAL | Age: 79
Discharge: HOME | End: 2024-12-30

## 2024-12-30 DIAGNOSIS — C79.51 NSCLC METASTATIC TO BONE (HCC): Primary | ICD-10-CM

## 2024-12-30 DIAGNOSIS — C34.90 NSCLC METASTATIC TO BONE (HCC): Primary | ICD-10-CM

## 2024-12-30 NOTE — PROGRESS NOTES
Pt arrived for labs, still complaining of unresolved rash on b/l arms. Rash on chest is improved. Photos sent to DERRICK Mcallister w/ Dr. Lee's office- hold treatment until pt is seen in office February 3rd. Pt aware and in agreement. Pt scheduled for labs following appt with Jesus 2/3 at 10:30am.

## 2024-12-30 NOTE — TELEPHONE ENCOUNTER
Call placed to [facility] in [place] to get patient's most recent office visit note faxed to us at [phone].

## 2025-01-02 ENCOUNTER — HOSPITAL ENCOUNTER (OUTPATIENT)
Dept: INFUSION CENTER | Facility: HOSPITAL | Age: 80
End: 2025-01-02
Attending: INTERNAL MEDICINE

## 2025-01-02 PROBLEM — Z86.718 HISTORY OF DVT (DEEP VEIN THROMBOSIS): Status: ACTIVE | Noted: 2025-01-02

## 2025-01-03 ENCOUNTER — OFFICE VISIT (OUTPATIENT)
Dept: FAMILY MEDICINE CLINIC | Facility: HOSPITAL | Age: 80
End: 2025-01-03
Payer: MEDICARE

## 2025-01-03 VITALS
RESPIRATION RATE: 16 BRPM | TEMPERATURE: 98.6 F | HEIGHT: 71 IN | BODY MASS INDEX: 35 KG/M2 | HEART RATE: 80 BPM | WEIGHT: 250 LBS | DIASTOLIC BLOOD PRESSURE: 66 MMHG | OXYGEN SATURATION: 97 % | SYSTOLIC BLOOD PRESSURE: 124 MMHG

## 2025-01-03 DIAGNOSIS — C34.90 NSCLC METASTATIC TO BONE (HCC): Primary | ICD-10-CM

## 2025-01-03 DIAGNOSIS — E11.65 TYPE 2 DIABETES MELLITUS WITH HYPERGLYCEMIA, WITHOUT LONG-TERM CURRENT USE OF INSULIN (HCC): ICD-10-CM

## 2025-01-03 DIAGNOSIS — Z93.3 COLOSTOMY IN PLACE (HCC): ICD-10-CM

## 2025-01-03 DIAGNOSIS — Z23 ENCOUNTER FOR IMMUNIZATION: ICD-10-CM

## 2025-01-03 DIAGNOSIS — Z86.718 HISTORY OF DVT (DEEP VEIN THROMBOSIS): ICD-10-CM

## 2025-01-03 DIAGNOSIS — I10 ESSENTIAL HYPERTENSION: ICD-10-CM

## 2025-01-03 DIAGNOSIS — C79.51 NSCLC METASTATIC TO BONE (HCC): Primary | ICD-10-CM

## 2025-01-03 DIAGNOSIS — J43.9 PULMONARY EMPHYSEMA, UNSPECIFIED EMPHYSEMA TYPE (HCC): ICD-10-CM

## 2025-01-03 PROBLEM — R60.0 UNILATERAL EDEMA OF LOWER EXTREMITY: Status: RESOLVED | Noted: 2023-02-22 | Resolved: 2025-01-03

## 2025-01-03 PROBLEM — U07.1 COVID: Status: RESOLVED | Noted: 2024-08-31 | Resolved: 2025-01-03

## 2025-01-03 PROCEDURE — G0008 ADMIN INFLUENZA VIRUS VAC: HCPCS

## 2025-01-03 PROCEDURE — 99214 OFFICE O/P EST MOD 30 MIN: CPT | Performed by: INTERNAL MEDICINE

## 2025-01-03 PROCEDURE — G2211 COMPLEX E/M VISIT ADD ON: HCPCS | Performed by: INTERNAL MEDICINE

## 2025-01-03 PROCEDURE — 90677 PCV20 VACCINE IM: CPT

## 2025-01-03 PROCEDURE — G0009 ADMIN PNEUMOCOCCAL VACCINE: HCPCS

## 2025-01-03 PROCEDURE — 90662 IIV NO PRSV INCREASED AG IM: CPT

## 2025-01-03 RX ORDER — CLOBETASOL PROPIONATE 0.5 MG/G
CREAM TOPICAL 2 TIMES DAILY
COMMUNITY

## 2025-01-03 NOTE — ASSESSMENT & PLAN NOTE
Pt has stage 4 nsclc  Developed a rash  Keytruda is on hold, rash has been getting better, located on the forearms.  Denies chest pains, dyspnea, cough.  His lungs were clear to auscultation today

## 2025-01-03 NOTE — ASSESSMENT & PLAN NOTE
CT of the chest in July 2023 showed mild emphysema.  Patient denies cough, shortness of breath, wheezing.  Lungs are clear to auscultation.  Will monitor patient

## 2025-01-03 NOTE — PROGRESS NOTES
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  Denies chest pains, dyspnea, cough.  His lungs were clear to auscultation today             Essential hypertension  BP is stable  Continue lisinopril and hctz  Will check electrolytes and renal fxn       Type 2 diabetes mellitus with hyperglycemia, without long-term current use of insulin ([facility])    Lab Results   Component Value Date    HGBA1C 6.0 2024     Checks glucose before breakfast one day alternating with before dinner the following day  Fbs runs 120-130  Will check A1c and urine microalbumin  Monofilament testing of the feet was normal today    Orders:    Albumin / creatinine urine ratio; Future    Comprehensive metabolic panel; Future    Hemoglobin A1C; Future    History of DVT (deep vein thrombosis)  Patient had an extensive left lower leg DVT in 2023 involving the entire femoral vein, popliteal vein.  He may have had a blood clot in the right upper extremity as well from his description    He is taking Eliquis 5 mg only once a day.  He denies signs of GI or  bleeding.  I told him that in my opinion he should be taking 5 mg of Eliquis twice a day.  I reached out to oncology to ask for their recommendations.    He has chronic venous stasis changes of the skin of the lower extremities bilaterally.  He will continue bilateral knee-high compression stockings.       Colostomy in place ([facility])  Patient is status post colostomy placement.  He told me that the reason for this was severe constipation, not colon cancer but he was vague.       Encounter for immunization    Orders:    influenza vaccine, high-dose, PF 0.5 mL (Fluzone High  "Dose)    Pneumococcal Conjugate Vaccine 20-valent (Pcv20)    Pulmonary emphysema, unspecified emphysema type (HCC)  CT of the chest in 2023 showed mild emphysema.  Patient denies cough, shortness of breath, wheezing.  Lungs are clear to auscultation.  Will monitor patient             Depression Screening and Follow-up Plan: Patient was screened for depression during today's encounter. They screened negative with a PHQ-2 score of 0.  Patient's depression screening was negative with an [place]  Depression Scale score of  .     History of Present Illness     HPI  Review of Systems    Objective   /66 (BP Location: Left arm, Patient Position: Sitting)   Pulse 80   Temp 98.6 °F (37 °C) (Tympanic)   Resp 16   Ht 5' 10.98\" (1.803 m)   Wt 113 kg (250 lb)   SpO2 97%   BMI 34.89 kg/m²      Physical Exam  Constitutional:       General: He is not in acute distress.     Appearance: He is not toxic-appearing.   HENT:      Head: Normocephalic.   Cardiovascular:      Rate and Rhythm: Normal rate and regular rhythm.      Pulses: no weak pulses.           Dorsalis pedis pulses are 2+ on the right side and 2+ on the left side.        Posterior tibial pulses are 2+ on the right side and 2+ on the left side.      Heart sounds: No murmur heard.  Pulmonary:      Effort: No respiratory distress.      Breath sounds: No wheezing or rales.   Abdominal:      General: Bowel sounds are normal.      Palpations: Abdomen is soft.      Tenderness: There is no abdominal tenderness.      Comments: Colostomy is present     Feet:      Right foot:      Skin integrity: No ulcer.      Left foot:      Skin integrity: No ulcer.   Skin:     Comments: Pt has brownish discoloration of the skin of the LEs without ulcers  Erythematous, blanching macules/papules are present on the forearms   Neurological:      Mental Status: He is alert and oriented to person, place, and time.      Cranial Nerves: No cranial nerve deficit.      Motor: " No weakness.   Psychiatric:         Mood and Affect: Mood normal.     Diabetic Foot Exam    Patient's shoes and socks removed.    Right Foot/Ankle   Right Foot Inspection  Skin Exam: No ulcer.     Toe Exam: Erythema    Sensory   Monofilament testing: intact    Vascular  The right DP pulse is 2+. The right PT pulse is 2+.     Left Foot/Ankle  Left Foot Inspection  Skin Exam: No ulcer.     Toe Exam: No erythema.     Sensory   Monofilament testing: intact    Vascular  The left DP pulse is 2+. The left PT pulse is 2+.     Assign Risk Category  No deformity present  No loss of protective sensation  No weak pulses  Risk: 0

## 2025-01-03 NOTE — ASSESSMENT & PLAN NOTE
Patient is status post colostomy placement.  He told me that the reason for this was severe constipation, not colon cancer but he was vague.

## 2025-01-03 NOTE — ASSESSMENT & PLAN NOTE
Lab Results   Component Value Date    HGBA1C 6.0 08/27/2024     Checks glucose before breakfast one day alternating with before dinner the following day  Fbs runs 120-130  Will check A1c and urine microalbumin  Monofilament testing of the feet was normal today    Orders:    Albumin / creatinine urine ratio; Future    Comprehensive metabolic panel; Future    Hemoglobin A1C; Future

## 2025-01-03 NOTE — ASSESSMENT & PLAN NOTE
Patient had an extensive left lower leg DVT in February 2023 involving the entire femoral vein, popliteal vein.  He may have had a blood clot in the right upper extremity as well from his description    He is taking Eliquis 5 mg only once a day.  He denies signs of GI or  bleeding.  I told him that in my opinion he should be taking 5 mg of Eliquis twice a day.  I reached out to oncology to ask for their recommendations.    He has chronic venous stasis changes of the skin of the lower extremities bilaterally.  He will continue bilateral knee-high compression stockings.

## 2025-01-07 ENCOUNTER — TELEPHONE (OUTPATIENT)
Dept: HEMATOLOGY ONCOLOGY | Facility: CLINIC | Age: 80
End: 2025-01-07

## 2025-01-07 DIAGNOSIS — I82.4Y2 ACUTE DEEP VEIN THROMBOSIS (DVT) OF PROXIMAL VEIN OF LEFT LOWER EXTREMITY (HCC): ICD-10-CM

## 2025-01-07 NOTE — TELEPHONE ENCOUNTER
Patient called asking for his script for eliquis not to be sent to [facility]. Script will be discontinued. Patient has 5 months of eliquis on hand and will take 2 tablets daily.

## 2025-01-07 NOTE — TELEPHONE ENCOUNTER
Left VM for patient to let him know that Dr. Arevalo reached out to Dr. Lee and he should be taking the Eliquis 5mg BID. I informed him we will send a new script to his pharmacy and my direct callback number was left for patient.

## 2025-01-21 ENCOUNTER — HOSPITAL ENCOUNTER (OUTPATIENT)
Dept: RADIOLOGY | Age: 80
Discharge: HOME | End: 2025-01-21
Payer: MEDICARE

## 2025-01-21 DIAGNOSIS — C79.51 NSCLC METASTATIC TO BONE (HCC): ICD-10-CM

## 2025-01-21 DIAGNOSIS — C34.90 NSCLC METASTATIC TO BONE (HCC): ICD-10-CM

## 2025-01-21 LAB — GLUCOSE SERPL-MCNC: 162 MG/DL (ref 65–140)

## 2025-01-21 PROCEDURE — 78815 PET IMAGE W/CT SKULL-THIGH: CPT

## 2025-01-21 PROCEDURE — 82948 REAGENT STRIP/BLOOD GLUCOSE: CPT

## 2025-01-21 PROCEDURE — A9552 F18 FDG: HCPCS

## 2025-01-21 NOTE — LETTER
[facility]  [address] [place] [address]      January 27, 2025    MRN: [id number]     Phone: [phone]     Dear Mr. TurkWilbert,    You recently had a(n) Nuclear Medicine performed on 1/21/2025 at  [facility] that was requested by AR Lynch. The study was reviewed by a radiologist, which is a physician who specializes in medical imaging. The radiologist issued a report describing his or her findings. In that report there was a finding that the radiologist felt warranted further discussion with your health care provider and that discussion would be beneficial to you.      The results were sent to AR Lynch on 01/21/2025  2:11 PM. We recommend that you contact AR Lynch at [phone] or set up an appointment to discuss the results of the imaging test. If you have already heard from AR Lynch regarding the results of your study, you can disregard this letter.     This letter is not meant to alarm you, but intended to encourage you to follow-up on your results with the provider that sent you for the imaging study. In addition, we have enclosed answers to frequently asked questions by other patients who have also received a letter to review results with their health care provider (see page two).      Thank you for choosing [facility] for your medical imaging needs.                                                                                                                                                        FREQUENTLY ASKED QUESTIONS    Why am I receiving this letter?  [place] State Law requires us to notify patients who have findings on imaging exams that may require more testing or follow-up with a health professional within the next 3 months.        How serious is the finding on the imaging test?  This letter is sent to all patients who may need follow-up or more  testing within the next 3 months.  Receiving this letter does not necessarily mean you have a life-threatening imaging finding or disease.  Recommendations in the radiologist’s imaging report are general in nature and it is up to your healthcare provider to say whether those recommendations make sense for your situation.  You are strongly encouraged to talk to your health care provider about the results and ask whether additional steps need to be taken.    Where can I get a copy of the final report for my recent radiology exam?  To get a full copy of the report you can access your records online at [email or web address] or please contact [facility] Medical Records Department at [phone] Monday through Friday between 8 am and 6 pm.         What do I need to do now?           Please contact your health care provider who requested the imaging study to discuss what further actions (if any) are needed.  You may have already heard from (your ordering provider) in regard to this test in which case you can disregard this letter.        NOTICE IN ACCORDANCE WITH THE [place] STATE “PATIENT TEST RESULT INFORMATION ACT OF 2018”    You are receiving this notice as a result of a determination by your diagnostic imaging service that further discussions of your test results are warranted and would be beneficial to you.    The complete results of your test or tests have been or will be sent to the health care practitioner that ordered the test or tests. It is recommended that you contact your health care practitioner to discuss your results as soon as possible.

## 2025-01-30 DIAGNOSIS — E11.65 TYPE 2 DIABETES MELLITUS WITH HYPERGLYCEMIA, WITHOUT LONG-TERM CURRENT USE OF INSULIN (HCC): ICD-10-CM

## 2025-01-30 RX ORDER — LANCETS
EACH MISCELLANEOUS
Qty: 100 EACH | Refills: 1 | Status: SHIPPED | OUTPATIENT
Start: 2025-01-30

## 2025-01-30 RX ORDER — LANCING DEVICE/LANCETS
KIT MISCELLANEOUS DAILY
Qty: 100 EACH | Refills: 5 | Status: SHIPPED | OUTPATIENT
Start: 2025-01-30

## 2025-01-30 NOTE — TELEPHONE ENCOUNTER
Reason for call:   [x] Refill   [] Prior Auth  [] Other:     Office:   [x] PCP/Provider - Catherine Arevalo MD / [facility]   [] Specialty/Provider -     Contour Next Test test strip / USE 1 STRIP TO CHECK GLUCOSE ONCE DAILY / 100    Microlet Lancets / USE 1 NEW LANCET TO CHECK GLUCOSE ONCE DAILY / Qty 100    Pharmacy: [facility] - [place] - [address]     Does the patient have enough for 3 days?   [x] Yes   [] No - Send as HP to POD

## 2025-01-31 NOTE — PROGRESS NOTES
Pt here for port labs; labs obtained without difficulty; pt aware of next appt; left unit ambulatory with steady gait 
31-Jan-2025 05:27

## 2025-02-03 ENCOUNTER — RESULTS FOLLOW-UP (OUTPATIENT)
Dept: FAMILY MEDICINE CLINIC | Facility: HOSPITAL | Age: 80
End: 2025-02-03

## 2025-02-03 ENCOUNTER — TELEPHONE (OUTPATIENT)
Age: 80
End: 2025-02-03

## 2025-02-03 ENCOUNTER — HOSPITAL ENCOUNTER (OUTPATIENT)
Dept: INFUSION CENTER | Facility: HOSPITAL | Age: 80
Discharge: HOME | End: 2025-02-03
Payer: MEDICARE

## 2025-02-03 ENCOUNTER — OFFICE VISIT (OUTPATIENT)
Age: 80
End: 2025-02-03
Payer: MEDICARE

## 2025-02-03 VITALS
TEMPERATURE: 97.5 F | BODY MASS INDEX: 34.72 KG/M2 | RESPIRATION RATE: 16 BRPM | WEIGHT: 248 LBS | SYSTOLIC BLOOD PRESSURE: 140 MMHG | HEART RATE: 85 BPM | HEIGHT: 71 IN | DIASTOLIC BLOOD PRESSURE: 70 MMHG | OXYGEN SATURATION: 95 %

## 2025-02-03 DIAGNOSIS — E11.65 TYPE 2 DIABETES MELLITUS WITH HYPERGLYCEMIA, WITHOUT LONG-TERM CURRENT USE OF INSULIN (HCC): ICD-10-CM

## 2025-02-03 DIAGNOSIS — C34.90 NSCLC METASTATIC TO BONE (HCC): Primary | ICD-10-CM

## 2025-02-03 DIAGNOSIS — C79.51 NSCLC METASTATIC TO BONE (HCC): Primary | ICD-10-CM

## 2025-02-03 LAB
ALBUMIN SERPL BCG-MCNC: 3.8 G/DL (ref 3.5–5)
ALP SERPL-CCNC: 102 U/L (ref 34–104)
ALT SERPL W P-5'-P-CCNC: 27 U/L (ref 7–52)
ANION GAP SERPL CALCULATED.3IONS-SCNC: 4 MMOL/L (ref 4–13)
AST SERPL W P-5'-P-CCNC: 24 U/L (ref 13–39)
BASOPHILS # BLD AUTO: 0.05 THOUSANDS/ΜL (ref 0–0.1)
BASOPHILS NFR BLD AUTO: 1 % (ref 0–1)
BILIRUB SERPL-MCNC: 1.05 MG/DL (ref 0.2–1)
BUN SERPL-MCNC: 17 MG/DL (ref 5–25)
CALCIUM SERPL-MCNC: 9.4 MG/DL (ref 8.4–10.2)
CHLORIDE SERPL-SCNC: 105 MMOL/L (ref 96–108)
CO2 SERPL-SCNC: 30 MMOL/L (ref 21–32)
CREAT SERPL-MCNC: 0.83 MG/DL (ref 0.6–1.3)
CREAT UR-MCNC: 121.2 MG/DL
EOSINOPHIL # BLD AUTO: 0.16 THOUSAND/ΜL (ref 0–0.61)
EOSINOPHIL NFR BLD AUTO: 4 % (ref 0–6)
ERYTHROCYTE [DISTWIDTH] IN BLOOD BY AUTOMATED COUNT: 14.1 % (ref 11.6–15.1)
EST. AVERAGE GLUCOSE BLD GHB EST-MCNC: 140 MG/DL
GFR SERPL CREATININE-BSD FRML MDRD: 83 ML/MIN/1.73SQ M
GLUCOSE SERPL-MCNC: 151 MG/DL (ref 65–140)
HBA1C MFR BLD: 6.5 %
HCT VFR BLD AUTO: 47.1 % (ref 36.5–49.3)
HGB BLD-MCNC: 15.8 G/DL (ref 12–17)
IMM GRANULOCYTES # BLD AUTO: 0.03 THOUSAND/UL (ref 0–0.2)
IMM GRANULOCYTES NFR BLD AUTO: 1 % (ref 0–2)
LYMPHOCYTES # BLD AUTO: 0.51 THOUSANDS/ΜL (ref 0.6–4.47)
LYMPHOCYTES NFR BLD AUTO: 12 % (ref 14–44)
MCH RBC QN AUTO: 31.7 PG (ref 26.8–34.3)
MCHC RBC AUTO-ENTMCNC: 33.5 G/DL (ref 31.4–37.4)
MCV RBC AUTO: 94 FL (ref 82–98)
MICROALBUMIN UR-MCNC: 15.1 MG/L
MICROALBUMIN/CREAT 24H UR: 12 MG/G CREATININE (ref 0–30)
MONOCYTES # BLD AUTO: 0.48 THOUSAND/ΜL (ref 0.17–1.22)
MONOCYTES NFR BLD AUTO: 11 % (ref 4–12)
NEUTROPHILS # BLD AUTO: 3.22 THOUSANDS/ΜL (ref 1.85–7.62)
NEUTS SEG NFR BLD AUTO: 71 % (ref 43–75)
NRBC BLD AUTO-RTO: 0 /100 WBCS
PLATELET # BLD AUTO: 167 THOUSANDS/UL (ref 149–390)
PMV BLD AUTO: 11.3 FL (ref 8.9–12.7)
POTASSIUM SERPL-SCNC: 3.8 MMOL/L (ref 3.5–5.3)
PROT SERPL-MCNC: 7.2 G/DL (ref 6.4–8.4)
RBC # BLD AUTO: 4.99 MILLION/UL (ref 3.88–5.62)
SODIUM SERPL-SCNC: 139 MMOL/L (ref 135–147)
WBC # BLD AUTO: 4.45 THOUSAND/UL (ref 4.31–10.16)

## 2025-02-03 PROCEDURE — G2211 COMPLEX E/M VISIT ADD ON: HCPCS | Performed by: INTERNAL MEDICINE

## 2025-02-03 PROCEDURE — 85025 COMPLETE CBC W/AUTO DIFF WBC: CPT | Performed by: INTERNAL MEDICINE

## 2025-02-03 PROCEDURE — 82043 UR ALBUMIN QUANTITATIVE: CPT

## 2025-02-03 PROCEDURE — 83036 HEMOGLOBIN GLYCOSYLATED A1C: CPT

## 2025-02-03 PROCEDURE — 99214 OFFICE O/P EST MOD 30 MIN: CPT | Performed by: INTERNAL MEDICINE

## 2025-02-03 PROCEDURE — 82570 ASSAY OF URINE CREATININE: CPT

## 2025-02-03 PROCEDURE — 80053 COMPREHEN METABOLIC PANEL: CPT

## 2025-02-03 NOTE — PROGRESS NOTES
Octavio Atkins  tolerated treatment well with no complications.      Octavio Atkins is aware of future appt on 03/31/2025 at 09:00 AM.     AVS printed and given to Octavio Atkins:  Yes (Calendar provided)

## 2025-02-04 DIAGNOSIS — E78.2 MIXED HYPERLIPIDEMIA: ICD-10-CM

## 2025-02-04 DIAGNOSIS — E11.65 TYPE 2 DIABETES MELLITUS WITH HYPERGLYCEMIA, WITHOUT LONG-TERM CURRENT USE OF INSULIN (HCC): ICD-10-CM

## 2025-02-04 RX ORDER — GLIPIZIDE 2.5 MG/1
2.5 TABLET, EXTENDED RELEASE ORAL DAILY
Qty: 90 TABLET | Refills: 1 | Status: SHIPPED | OUTPATIENT
Start: 2025-02-04

## 2025-02-04 RX ORDER — LOVASTATIN 20 MG/1
20 TABLET ORAL DAILY
Qty: 30 TABLET | Refills: 0 | Status: SHIPPED | OUTPATIENT
Start: 2025-02-04

## 2025-02-04 NOTE — TELEPHONE ENCOUNTER
Reason for call:   [x] Refill   [] Prior Auth  [] Other:     Office:   [x] PCP/Provider - Catherine Arevalo (new PCP)  [] Specialty/Provider -     Medication: glipiZIDE (GLUCOTROL XL) 2.5 mg   Dose/Frequency: 1 daily  Quantity: 90    lovastatin (MEVACOR) 20 mg   1 Daily  Qty 90    Pharmacy: [facility] [place]    Does the patient have enough for 3 days?   [] Yes   [x] No - Send as HP to POD

## 2025-02-05 NOTE — PROGRESS NOTES
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  We will continue to watch the left upper lobe lesion but it would be difficult to biopsy.  I will see him back in 6 months with a CT of the chest.  I think this is a great excuse to take a treatment break from Keytruda after 5 years.  The fact that the skin is starting to blister makes me nervous for a possible bullous pemphigoid induced by Keytruda.  He is in remission and the risks of continuing Keytruda at this point for away the benefits.  We will continue to flush his port every 2 months.  He will continue to follow with his dermatologist for squamous cell carcinomas.  These have been confined to the skin and I do not at this point considered reason to continue the Keytruda either.  He knows to call in the interim with any questions or concerns.    History of Present Illness   Chief Complaint   Patient presents with    Follow-up     79-year-old male with a history of metastatic adenocarcinoma of the lung with metastasis to bone diagnosed in 2018.  He initially had carboplatin and Alimta followed by maintenance Alimta.  He has been on maintenance pembrolizumab since 2019.  His last dose was in November and we have held it since that time due to worsening rash.  He does a multiple squamous cell carcinomas but there is a second distinct rash that is very itchy.  It is now starting to blister.  He has not been able to show the blistering part of the rash to his dermatologist because the blisters break very quickly.  He had a PET scan prior to this visit that  shows a 1.6 cm left upper lobe nodule which we have been watching.  It is groundglass and has been growing very slowly.  There is no other active sites of disease.    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)   3/1/2018 -  Cancer Staged    Staging form: Lung, AJCC 8th Edition  - Clinical stage from 3/1/2018: Stage IVB (cTX, cN2, cM1c) - Signed by Ally Lee DO on 5/18/2022 5/18/2022 Initial Diagnosis    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)     5/25/2022 - 11/30/2022 Chemotherapy    pembrolizumab (KEYTRUDA) IVPB, 200 mg, Intravenous, Once, 10 of 13 cycles  Administration: 200 mg (5/25/2022), 200 mg (6/15/2022), 200 mg (7/6/2022), 200 mg (7/27/2022), 200 mg (8/17/2022), 200 mg (9/7/2022), 200 mg (9/28/2022), 200 mg (10/19/2022), 200 mg (11/9/2022), 200 mg (11/30/2022)     12/21/2022 - 12/21/2022 Chemotherapy    pembrolizumab (KEYTRUDA) IVPB, 400 mg, Intravenous, Once, 1 of 6 cycles     12/21/2022 - 11/20/2024 Chemotherapy    alteplase (CATHFLO), 2 mg, Intracatheter, Every 2 hour PRN, 33 of 37 cycles  pembrolizumab (KEYTRUDA) IVPB, 200 mg, Intravenous, Once, 33 of 37 cycles  Administration: 200 mg (12/21/2022), 200 mg (1/11/2023), 200 mg (2/1/2023), 200 mg (2/22/2023), 200 mg (3/15/2023), 200 mg (4/5/2023), 200 mg (4/26/2023), 200 mg (5/17/2023), 200 mg (6/7/2023), 200 mg (6/28/2023), 200 mg (7/19/2023), 200 mg (8/9/2023), 200 mg (8/30/2023), 200  "mg (9/20/2023), 200 mg (10/11/2023), 200 mg (11/1/2023), 200 mg (11/22/2023), 200 mg (12/13/2023), 200 mg (1/3/2024), 200 mg (2/14/2024), 200 mg (3/14/2024), 200 mg (4/3/2024), 200 mg (4/24/2024), 200 mg (5/15/2024), 200 mg (6/5/2024), 200 mg (6/26/2024), 200 mg (7/17/2024), 200 mg (8/7/2024), 200 mg (8/28/2024), 200 mg (9/18/2024), 200 mg (10/9/2024), 200 mg (10/30/2024), 200 mg (11/20/2024)        Pertinent Medical History      Review of Systems otherwise neg        Objective   /70 (BP Location: Left arm, Patient Position: Sitting, Cuff Size: Adult)   Pulse 85   Temp 97.5 °F (36.4 °C) (Temporal)   Resp 16   Ht 5' 10.98\" (1.803 m)   Wt 112 kg (248 lb)   SpO2 95%   BMI 34.61 kg/m²     Pain Screening:  Pain Score: 0-No pain  ECOG   1  Physical Exam    GEN: Alert, awake oriented x3, in no acute distress  HEENT- No pallor, icterus, cyanosis, no oral mucosal lesions,   LAD - no palpable cervical, clavicle, axillary, inguinal LAD  Heart- normal S1 S2, regular rate and rhythm, No murmur, rubs.   Lungs- clear breathing sound bilateral.   Abdomen- soft, Non tender, bowel sounds present  Extremities- No cyanosis, clubbing, edema  Neuro- No focal neurological deficit  Skin - areas of open skin on arms and upper shoulders b/l    Labs: I have reviewed the following labs:  Lab Results   Component Value Date/Time    WBC 4.45 02/03/2025 11:24 AM    RBC 4.99 02/03/2025 11:24 AM    Hemoglobin 15.8 02/03/2025 11:24 AM    Hematocrit 47.1 02/03/2025 11:24 AM    MCV 94 02/03/2025 11:24 AM    MCH 31.7 02/03/2025 11:24 AM    RDW 14.1 02/03/2025 11:24 AM    Platelets 167 02/03/2025 11:24 AM    Segmented % 71 02/03/2025 11:24 AM    Lymphocytes % 12 (L) 02/03/2025 11:24 AM    Monocytes % 11 02/03/2025 11:24 AM    Eosinophils Relative 4 02/03/2025 11:24 AM    Basophils Relative 1 02/03/2025 11:24 AM    Immature Grans % 1 02/03/2025 11:24 AM    Absolute Neutrophils 3.22 02/03/2025 11:24 AM     Lab Results   Component Value " Date/Time    Potassium 3.8 02/03/2025 11:14 AM    Chloride 105 02/03/2025 11:14 AM    CO2 30 02/03/2025 11:14 AM    BUN 17 02/03/2025 11:14 AM    Creatinine 0.83 02/03/2025 11:14 AM    Calcium 9.4 02/03/2025 11:14 AM    AST 24 02/03/2025 11:14 AM    ALT 27 02/03/2025 11:14 AM    Alkaline Phosphatase 102 02/03/2025 11:14 AM    Total Protein 7.2 02/03/2025 11:14 AM    Albumin 3.8 02/03/2025 11:14 AM    Total Bilirubin 1.05 (H) 02/03/2025 11:14 AM    eGFR 83 02/03/2025 11:14 AM

## 2025-02-05 NOTE — ASSESSMENT & PLAN NOTE
Orders:    CT chest wo contrast; Future    CBC and differential; Future    Comprehensive metabolic panel; Future

## 2025-03-01 DIAGNOSIS — E78.2 MIXED HYPERLIPIDEMIA: ICD-10-CM

## 2025-03-03 RX ORDER — LOVASTATIN 20 MG/1
20 TABLET ORAL DAILY
Qty: 30 TABLET | Refills: 0 | Status: SHIPPED | OUTPATIENT
Start: 2025-03-03

## 2025-03-30 DIAGNOSIS — C79.51 NSCLC METASTATIC TO BONE (HCC): ICD-10-CM

## 2025-03-30 DIAGNOSIS — E87.6 HYPOKALEMIA: ICD-10-CM

## 2025-03-30 DIAGNOSIS — C34.90 NSCLC METASTATIC TO BONE (HCC): ICD-10-CM

## 2025-03-31 ENCOUNTER — HOSPITAL ENCOUNTER (OUTPATIENT)
Dept: INFUSION CENTER | Facility: HOSPITAL | Age: 80
Discharge: HOME | End: 2025-03-31
Payer: MEDICARE

## 2025-03-31 DIAGNOSIS — C79.51 NSCLC METASTATIC TO BONE (HCC): Primary | ICD-10-CM

## 2025-03-31 DIAGNOSIS — C34.90 NSCLC METASTATIC TO BONE (HCC): Primary | ICD-10-CM

## 2025-03-31 PROCEDURE — 96523 IRRIG DRUG DELIVERY DEVICE: CPT

## 2025-03-31 RX ORDER — POTASSIUM CHLORIDE 1500 MG/1
20 TABLET, EXTENDED RELEASE ORAL DAILY
Qty: 90 TABLET | Refills: 1 | Status: SHIPPED | OUTPATIENT
Start: 2025-03-31

## 2025-03-31 NOTE — PROGRESS NOTES
Octavio Atkins  tolerated treatment well with no complications.      Octavio Atkins is aware of future appt on 5/27/2025 at 0900.     AVS printed and given to Octavio Atkins:    No (Declined by Octavio Atkins)

## 2025-04-10 ENCOUNTER — TELEPHONE (OUTPATIENT)
Age: 80
End: 2025-04-10

## 2025-04-10 LAB
LEFT EYE DIABETIC RETINOPATHY: NORMAL
RIGHT EYE DIABETIC RETINOPATHY: NORMAL

## 2025-04-10 NOTE — TELEPHONE ENCOUNTER
Octavio called in because he wants to change his same day appointment. It does not allow me to change it to another same day appointment. He asked if it can be changed to the next day 5/7/25. Please call to assist with this change.

## 2025-04-11 DIAGNOSIS — E78.2 MIXED HYPERLIPIDEMIA: ICD-10-CM

## 2025-04-14 DIAGNOSIS — E78.2 MIXED HYPERLIPIDEMIA: ICD-10-CM

## 2025-04-14 RX ORDER — LOVASTATIN 20 MG/1
20 TABLET ORAL DAILY
Qty: 30 TABLET | Refills: 0 | Status: SHIPPED | OUTPATIENT
Start: 2025-04-14 | End: 2025-04-14 | Stop reason: SDUPTHER

## 2025-04-14 RX ORDER — LOVASTATIN 20 MG/1
20 TABLET ORAL DAILY
Qty: 90 TABLET | Refills: 3 | Status: SHIPPED | OUTPATIENT
Start: 2025-04-14

## 2025-04-15 ENCOUNTER — OFFICE VISIT (OUTPATIENT)
Dept: FAMILY MEDICINE CLINIC | Facility: HOSPITAL | Age: 80
End: 2025-04-15
Payer: MEDICARE

## 2025-04-15 VITALS
HEIGHT: 72 IN | TEMPERATURE: 99.2 F | BODY MASS INDEX: 33.05 KG/M2 | HEART RATE: 81 BPM | RESPIRATION RATE: 16 BRPM | OXYGEN SATURATION: 94 % | DIASTOLIC BLOOD PRESSURE: 60 MMHG | WEIGHT: 244 LBS | SYSTOLIC BLOOD PRESSURE: 120 MMHG

## 2025-04-15 DIAGNOSIS — E11.65 TYPE 2 DIABETES MELLITUS WITH HYPERGLYCEMIA, WITHOUT LONG-TERM CURRENT USE OF INSULIN (HCC): ICD-10-CM

## 2025-04-15 DIAGNOSIS — R07.89 ACUTE CHEST WALL PAIN: Primary | ICD-10-CM

## 2025-04-15 DIAGNOSIS — I10 ESSENTIAL HYPERTENSION: ICD-10-CM

## 2025-04-15 PROCEDURE — 99214 OFFICE O/P EST MOD 30 MIN: CPT | Performed by: INTERNAL MEDICINE

## 2025-04-15 PROCEDURE — G2211 COMPLEX E/M VISIT ADD ON: HCPCS | Performed by: INTERNAL MEDICINE

## 2025-04-15 RX ORDER — CLOBETASOL PROPIONATE 0.5 MG/G
OINTMENT TOPICAL
COMMUNITY
Start: 2025-03-09

## 2025-04-15 NOTE — ASSESSMENT & PLAN NOTE
Blood pressure is controlled today.  Electrolytes were normal in February and renal function was stable.  Denies cough on lisinopril.  Continue lisinopril and hydrochlorothiazide

## 2025-04-15 NOTE — PROGRESS NOTES
Name: Octavio Atkins      : 1945      MRN: [id number]  Encounter Provider: Catherine Arevalo MD  Encounter Date: 4/15/2025   Encounter department: [facility] CARE [address]  :  Assessment & Plan       Preventive health issues were discussed with patient, and age appropriate screening tests were ordered as noted in patient's After Visit Summary. Personalized health advice and appropriate referrals for health education or preventive services given if needed, as noted in patient's After Visit Summary.    History of Present Illness     HPI   Patient Care Team:  Catherine Arevalo MD as PCP - General (Internal Medicine)  AR Lynch (Hematology and Oncology)  Ally Lee DO (Oncology)    Review of Systems  Medical History Reviewed by provider this encounter:       Annual Wellness Visit Questionnaire   Octavio is here for his Subsequent Wellness visit.     Health Risk Assessment:   Patient rates overall health as good. Patient feels that their physical health rating is same. Patient is satisfied with their life. Eyesight was rated as same. Hearing was rated as same. Patient feels that their emotional and mental health rating is same. Patients states they are sometimes angry. Patient states they are sometimes unusually tired/fatigued. Pain experienced in the last 7 days has been some. Patient's pain rating has been 3/10. Patient states that he has experienced no weight loss or gain in last 6 months.     Depression Screening:   PHQ-2 Score: 0      Fall Risk Screening:   In the past year, patient has experienced: no history of falling in past year      Home Safety:  Patient does not have trouble with stairs inside or outside of their home. Patient has working smoke alarms and has working carbon monoxide detector. Home safety hazards include: none.     Nutrition:   Current diet is Regular. Pt eats what he wants.     Medications:   Patient is currently taking over-the-counter supplements.  OTC medications include: see medication list. Patient is able to manage medications.     Activities of Daily Living (ADLs)/Instrumental Activities of Daily Living (IADLs):   Walk and transfer into and out of bed and chair?: Yes  Dress and groom yourself?: Yes    Bathe or shower yourself?: Yes    Feed yourself? Yes  Do your laundry/housekeeping?: Yes  Manage your money, pay your bills and track your expenses?: Yes  Make your own meals?: Yes    Do your own shopping?: Yes    Previous Hospitalizations:   Any hospitalizations or ED visits within the last 12 months?: No      Advance Care Planning:   Living will: No      Preventive Screenings      Cardiovascular Screening:    General: Screening Not Indicated and History Lipid Disorder      Diabetes Screening:     General: Screening Not Indicated and History Diabetes      Prostate Cancer Screening:    General: Screening Not Indicated      Abdominal Aortic Aneurysm (AAA) Screening:    Risk factors include: tobacco use        Lung Cancer Screening:     General: Screening Not Indicated and History Lung Cancer    Immunizations:  - Immunizations due: Zoster (Shingrix)    Screening, Brief Intervention, and Referral to Treatment (SBIRT)     Screening  Typical number of drinks in a day: 2  Typical number of drinks in a week: 14  Interpretation: Low risk drinking behavior.    Single Item Drug Screening:  How often have you used an illegal drug (including marijuana) or a prescription medication for non-medical reasons in the past year? never    Single Item Drug Screen Score: 0  Interpretation: Negative screen for possible drug use disorder    Social Drivers of Health     Financial Resource Strain: Low Risk  (12/22/2023)    Overall Financial Resource Strain (CARDIA)     Difficulty of Paying Living Expenses: Not very hard   Food Insecurity: No Food Insecurity (4/26/2024)    Nursing - Inadequate Food Risk Classification     Worried About Running Out of Food in the Last Year: Never true  "    Ran Out of Food in the Last Year: Never true   Transportation Needs: No Transportation Needs (4/26/2024)    PRAPARE - Transportation     Lack of Transportation (Medical): No     Lack of Transportation (Non-Medical): No   Housing Stability: Low Risk  (4/26/2024)    Housing Stability Vital Sign     Unable to Pay for Housing in the Last Year: No     Number of Times Moved in the Last Year: 1     Homeless in the Last Year: No   Utilities: Not At Risk (4/26/2024)    [facility] Utilities     Threatened with loss of utilities: No     No results found.    Objective   /60   Pulse 81   Temp 99.2 °F (37.3 °C) (Tympanic)   Resp 16   Ht 5' 11.5\" (1.816 m) Comment: actual  Wt 111 kg (244 lb)   SpO2 94%   BMI 33.56 kg/m²     Physical Exam    "

## 2025-04-15 NOTE — PROGRESS NOTES
"Name: Octavio Atkins      : 1945      MRN: [id number]  Encounter Provider: Catherine Arevalo MD  Encounter Date: 4/15/2025   Encounter department: [facility] PRIMARY CARE [address]  :  Assessment & Plan  Acute chest wall pain  Patient complains of a stabbing pain that origins from the right mid axillary line and occurs when he moves in certain direction with his trunk or leans on his right arm.  It started after he was throwing a 50 pound bags on his pickup truck about a week ago.  He was in no distress on room air.  Lungs were clear to auscultation, heart was regular rhythm.  He had point tenderness in the right upper mid axillary line of his chest.  Chest expansion was normal and equal.  There was no subcutaneous ecchymosis or hematoma.  He most likely has a strained chest wall muscle.  Doubt VTE, pneumothorax, pneumonia, angina.  Will observe patient for now       Type 2 diabetes mellitus with hyperglycemia, without long-term current use of insulin ([facility])    Lab Results   Component Value Date    HGBA1C 6.5 (H) 2025     Patient has type 2 diabetes mellitus.  A1c was 6.5 in February.  I asked him to decrease his sweets sugar and carb intake as A1c increased from 6.0 last year.  I will recheck his A1c before I see him in August.  He saw ophthalmology last year.  Orders:  •  Comprehensive metabolic panel; Future  •  Hemoglobin A1C; Future  •  CBC and Platelet; Future    Essential hypertension  Blood pressure is controlled today.  Electrolytes were normal in February and renal function was stable.  Denies cough on lisinopril.  Continue lisinopril and hydrochlorothiazide              History of Present Illness   HPI  Review of Systems    Objective   /60   Pulse 81   Temp 99.2 °F (37.3 °C) (Tympanic)   Resp 16   Ht 5' 11.5\" (1.816 m) Comment: actual  Wt 111 kg (244 lb)   SpO2 94%   BMI 33.56 kg/m²      Physical Exam  Constitutional:       General: He is not in acute distress.     " Appearance: He is not toxic-appearing.   Cardiovascular:      Rate and Rhythm: Normal rate and regular rhythm.      Heart sounds: No murmur heard.     No gallop.   Pulmonary:      Effort: No respiratory distress.      Breath sounds: No wheezing or rales.   Abdominal:      General: Bowel sounds are normal.      Palpations: Abdomen is soft.      Tenderness: There is no abdominal tenderness.   Musculoskeletal:         General: Tenderness present.   Skin:     Findings: No bruising, erythema or rash.   Neurological:      Mental Status: He is alert.

## 2025-04-15 NOTE — ASSESSMENT & PLAN NOTE
Lab Results   Component Value Date    HGBA1C 6.5 (H) 02/03/2025     Patient has type 2 diabetes mellitus.  A1c was 6.5 in February.  I asked him to decrease his sweets sugar and carb intake as A1c increased from 6.0 last year.  I will recheck his A1c before I see him in August.  He saw ophthalmology last year.  Orders:  •  Comprehensive metabolic panel; Future  •  Hemoglobin A1C; Future  •  CBC and Platelet; Future

## 2025-04-20 DIAGNOSIS — I10 ESSENTIAL HYPERTENSION: ICD-10-CM

## 2025-04-21 ENCOUNTER — NURSE TRIAGE (OUTPATIENT)
Age: 80
End: 2025-04-21

## 2025-04-21 DIAGNOSIS — Z93.3 COLOSTOMY IN PLACE (HCC): Primary | ICD-10-CM

## 2025-04-21 RX ORDER — HYDROCHLOROTHIAZIDE 25 MG/1
25 TABLET ORAL DAILY
Qty: 90 TABLET | Refills: 1 | Status: SHIPPED | OUTPATIENT
Start: 2025-04-21

## 2025-04-21 RX ORDER — LISINOPRIL 20 MG/1
20 TABLET ORAL DAILY
Qty: 90 TABLET | Refills: 1 | Status: SHIPPED | OUTPATIENT
Start: 2025-04-21

## 2025-04-21 NOTE — TELEPHONE ENCOUNTER
"FOLLOW UP: Patient and spouse refusing appointment, looking for recommendation from Dr. Arevalo if patient should see a specialist, please review    REASON FOR CONVERSATION: Ostomy Problem    SYMPTOMS: Patient and patient's wife called regarding a large blister under patient's colostomy wafer near stoma. Patient has a colostomy in [facility]. Advised that appointment should be made to evaluate the blister. Denies any symptoms. Patient's wife refused saying she is not going to take the wafer off again as it had to be changed multiple times in the last few days. Again advised appointment should be made so provider can evaluate this in person. Patient and patient's wife refused. Please review and advise.     OTHER: N/A    DISPOSITION: See PCP Within 3 Days      Reason for Disposition   [1] Mild redness or irritation of abdomen skin around stoma AND [2] no improvement after using Care Advice    Answer Assessment - Initial Assessment Questions  1. TYPE: \"What type of ostomy do you have?\" (e.g., ileostomy, colostomy; temporary, long-term [permanent]).      Colostomy   2. LOCATION: \"Where is the ostomy located?\"      [facility]  3. DURATION: \"How long have you had the ostomy?\" (e.g., days, weeks, months, years).      6 years  4. MAIN CONCERN: \"What is your main concern or symptom today?\" (e.g., skin redness or irritation, bleeding, leaking, change in output or appearance of ostomy).      Large blister under wafer, near the stoma  5. DAILY OSTOMY OUTPUT DIARY: \"Do you keep a daily diary of the output from your ostomy\"?       No  6. OSTOMY OUTPUT: \"How much stool output over a 24 hour period are you having?\" (e.g., ml; consistency of water, milkshake, pudding)      Varies on what is eaten, but usually like pudding  7. OTHER SYMPTOMS: \"Are you having any other symptoms?\" (e.g., fever, vomiting, blood in stool, abdomen pain, dizziness)       Denies    Protocols used: Ostomy Symptoms and Questions-Adult-AH    "

## 2025-04-23 ENCOUNTER — OFFICE VISIT (OUTPATIENT)
Facility: HOSPITAL | Age: 80
End: 2025-04-23
Payer: MEDICARE

## 2025-04-23 DIAGNOSIS — C34.90 NSCLC METASTATIC TO BONE (HCC): ICD-10-CM

## 2025-04-23 DIAGNOSIS — C79.51 NSCLC METASTATIC TO BONE (HCC): ICD-10-CM

## 2025-04-23 DIAGNOSIS — S31.109A OPEN WOUND OF ABDOMEN, INITIAL ENCOUNTER: ICD-10-CM

## 2025-04-23 DIAGNOSIS — Z93.3 COLOSTOMY IN PLACE (HCC): Primary | ICD-10-CM

## 2025-04-23 DIAGNOSIS — Z71.89 ENCOUNTER FOR OSTOMY NURSE CONSULTATION: ICD-10-CM

## 2025-04-23 DIAGNOSIS — E11.65 TYPE 2 DIABETES MELLITUS WITH HYPERGLYCEMIA, WITHOUT LONG-TERM CURRENT USE OF INSULIN (HCC): ICD-10-CM

## 2025-04-23 PROCEDURE — 99213 OFFICE O/P EST LOW 20 MIN: CPT

## 2025-04-23 PROCEDURE — 99203 OFFICE O/P NEW LOW 30 MIN: CPT

## 2025-04-24 NOTE — PROGRESS NOTES
Patient ID: Octavio Atkins is a 80 y.o. male Date of Birth 1945       Chief Complaint   Patient presents with    New Patient Visit     Blister under ostomy       Allergies:  Aspirin and Ibuprofen    Diagnosis:      Diagnosis ICD-10-CM Associated Orders   1. Colostomy in place ([facility])  Z93.3 Wound cleansing and dressings      2. 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)  C34.90 Wound cleansing and dressings    C79.51       3. Encounter for ostomy nurse consultation  Z71.89       4. Type 2 diabetes mellitus with hyperglycemia, without long-term current use of insulin ([facility])  E11.65       5. Open wound of abdomen, initial encounter  S31.109A               Assessment & Plan:  Patient seen in office today in conjunction with WOCN.   Recommend to isolate partial-thickness wounds with hydrocolloid dressings. Continue with current pouching system/routine over top of the dressings.  Recommend to change pouch and dressings every 2 to 3 days and as needed for leaking.  Will attempt to order patient wound care supplies.  No clinical s/s of infection present.   Follow-up 2 weeks, call sooner with questions or concerns.  Patient verbalized understanding of plan of care.          Subjective:   4/23/25: Patient presents to the ostomy clinic for initial visit of peristomal skin breakdown, accompanied by his wife who is involved in his care and provides all of his ostomy care.  Patient has history of colostomy creation approximately 6 years ago at [facility] in [place].  States that colostomy was created as part of cancer treatment.  Patient is currently under care of oncology for metastatic cancer, states that he began to develop blisters on his skin and around his stoma recently, which were felt to be related to his Keytruda infusions which have been stopped by his oncology team.  Patient states that since stopping the medication the blisters have improved.  Wife and patient report reduced wear time of pouch since the blisters have been  present underneath the ostomy wafer.  Patient reports that when his skin is intact he has a 4-day wear time with rare leaking episodes.  He is currently in a 2 piece cut to fit ConvaTec pouch, wife uses barrier ring, ostomy paste and barrier extenders with pouch changes as well.  Patient offers no other stomal or abdominal related complaints.  Reports stoma is functioning without issue.  No fever or chills.        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)    Essential hypertension    Hypokalemia    Mixed hyperlipidemia    Glaucoma of both eyes    Type 2 diabetes mellitus with hyperglycemia, without long-term current use of insulin (HCC)    Colostomy in place (HCC)    Dysequilibrium    Fatty liver    Left thyroid nodule    Pulmonary emphysema (HCC)    Long term current use of anticoagulant    History of DVT (deep vein thrombosis)     Past Medical History:   Diagnosis Date    DVT of deep femoral vein, left (HCC) 2023     Past Surgical History:   Procedure Laterality Date    ELBOW SURGERY Left     pinched nerve     Family History   Problem Relation Age of Onset    Colon cancer Father       Social History     Socioeconomic History    Marital status: /Civil Union     Spouse name: Not on file    Number of children: Not on file    Years of education: Not on file    Highest education level: Not on file   Occupational History    Not on file   Tobacco Use    Smoking status: Former     Current packs/day: 0.00     Average packs/day: 1 pack/day for 50.0 years (50.0 ttl pk-yrs)     Types: Cigarettes     Start date:      Quit date:      Years since quittin.3    Smokeless tobacco: Never   Vaping Use    Vaping status: Never Used   Substance and Sexual Activity    Alcohol use: Yes     Alcohol/week: 21.0 standard drinks of alcohol     Types: 21 Glasses of wine per week     Comment:  social drinker    Drug use: Never    Sexual activity: Not Currently     Partners: Female   Other Topics Concern    Not on file   Social History Narrative    Not on file     Social Drivers of Health     Financial Resource Strain: Low Risk  (12/22/2023)    Overall Financial Resource Strain (CARDIA)     Difficulty of Paying Living Expenses: Not very hard   Food Insecurity: No Food Insecurity (4/15/2025)    Hunger Vital Sign     Worried About Running Out of Food in the Last Year: Never true     Ran Out of Food in the Last Year: Never true   Transportation Needs: No Transportation Needs (4/15/2025)    PRAPARE - Transportation     Lack of Transportation (Medical): No     Lack of Transportation (Non-Medical): No   Physical Activity: Not on file   Stress: Not on file   Social Connections: Not on file   Intimate Partner Violence: Not on file   Housing Stability: Low Risk  (4/15/2025)    Housing Stability Vital Sign     Unable to Pay for Housing in the Last Year: No     Number of Times Moved in the Last Year: 1     Homeless in the Last Year: No        Current Outpatient Medications:     apixaban (Eliquis) 5 mg, Take 1 tablet (5 mg total) by mouth 2 (two) times a day, Disp: 60 tablet, Rfl: 11    Calcium Carb-Cholecalciferol (CALCIUM 1000 + D PO), Take by mouth 1 daily, Disp: , Rfl:     Cholecalciferol (Vitamin D3) 1.25 MG (29465 UT) CAPS, Take by mouth 1 daily, Disp: , Rfl:     clobetasol (TEMOVATE) 0.05 % ointment, APPLY OINTMENT TOPICALLY TO AFFECTED AREA TWICE DAILY FOR 2 WEEKS. TAKE 1 WEEK OFF THEN REPEAT, Disp: , Rfl:     Contour Next Test test strip, USE 1 STRIP TO CHECK GLUCOSE ONCE DAILY, Disp: 100 each, Rfl: 1    Contour Next Test test strip, USE 1 STRIP TO CHECK GLUCOSE ONCE DAILY, Disp: 100 each, Rfl: 1    dorzolamide-timolol (COSOPT) 22.3-6.8 MG/ML ophthalmic solution, INSTILL 1 DROP INTO EACH EYE TWICE DAILY, Disp: , Rfl:     glipiZIDE (GLUCOTROL XL) 2.5 mg 24 hr tablet, Take 1 tablet (2.5 mg total) by mouth  daily, Disp: 90 tablet, Rfl: 1    hydroCHLOROthiazide 25 mg tablet, Take 1 tablet by mouth once daily, Disp: 90 tablet, Rfl: 1    Lancet Devices (Microlet Next Lancing Device) MISC, Inject under the skin in the morning, Disp: 100 each, Rfl: 5    lisinopril (ZESTRIL) 20 mg tablet, Take 1 tablet by mouth once daily, Disp: 90 tablet, Rfl: 1    lovastatin (MEVACOR) 20 mg tablet, Take 1 tablet (20 mg total) by mouth daily, Disp: 90 tablet, Rfl: 3    Microlet Lancets MISC, USE 1 NEW LANCET TO CHECK GLUCOSE ONCE DAILY, Disp: 100 each, Rfl: 1    mupirocin (BACTROBAN) 2 % ointment, Apply topically 3 (three) times a day as needed (skin infection), Disp: 1 g, Rfl: 1    potassium chloride (Klor-Con M20) 20 mEq tablet, Take 1 tablet (20 mEq total) by mouth daily, Disp: 90 tablet, Rfl: 1    Review of Systems   Constitutional:  Negative for chills and fever.   HENT:  Negative for congestion and sneezing.    Respiratory:  Negative for cough and shortness of breath.    Cardiovascular:  Negative for chest pain.   Gastrointestinal:  Negative for abdominal distention, abdominal pain, blood in stool, constipation and diarrhea.   Skin:  Positive for wound. Negative for color change and rash.   Psychiatric/Behavioral:  Negative for agitation.        Objective:  There were no vitals taken for this visit.        Physical Exam  Constitutional:       General: He is awake. He is not in acute distress.     Appearance: He is obese. He is not ill-appearing, toxic-appearing or diaphoretic.   HENT:      Head: Normocephalic and atraumatic.      Right Ear: External ear normal.      Left Ear: External ear normal.   Eyes:      Conjunctiva/sclera: Conjunctivae normal.   Pulmonary:      Effort: Pulmonary effort is normal. No respiratory distress.   Abdominal:      General: There is no distension.      Palpations: Abdomen is soft.      Tenderness: There is no abdominal tenderness. There is no guarding or rebound.      Hernia: No hernia is present.       Comments: Left lower quadrant colostomy.  Ostomy is oval-shaped, moist pink in color.  Stoma is barely budded with os noted at skin level distally.  Mucocutaneous junction is intact without separation.  See skin assessment for peristomal skin findings.  No apparent prolapse or hernia on exam.   Musculoskeletal:      Cervical back: Neck supple.   Skin:     General: Skin is warm and dry.      Findings: Wound present. No erythema or rash.      Comments: 1.  Peristomal skin with scattered ruptured blisters that present as partial-thickness skin loss with pink/red tissue that is producing scant amount of serosanguineous drainage.  Small amount of residual blister roof gently cleansed away with saline and gauze.  No purulent drainage or malodor.  No evidence of cellulitis on exam.  No intact blisters on exam. Refer to flowsheets for additional details.   Neurological:      Mental Status: He is alert and oriented to person, place, and time.   Psychiatric:         Mood and Affect: Mood normal.         Behavior: Behavior normal. Behavior is cooperative.         Wound 04/23/25 Other (Comments) Abdomen Left;Medial;Lower;Superior (Active)   Wound Image   04/23/25 1437   Wound Description Pink 04/23/25 1437   Non-staged Wound Description Partial thickness 04/23/25 1437   Wound Length (cm) 2 cm 04/23/25 1437   Wound Width (cm) 9.5 cm 04/23/25 1437   Wound Depth (cm) 0.1 cm 04/23/25 1437   Wound Surface Area (cm^2) 19 cm^2 04/23/25 1437   Wound Volume (cm^3) 1.9 cm^3 04/23/25 1437   Calculated Wound Volume (cm^3) 1.9 cm^3 04/23/25 1437   Drainage Amount Scant 04/23/25 1437   Drainage Description Serosanguineous 04/23/25 1437   Kiarra-wound Assessment Fragile 04/23/25 1437   Treatments Cleansed 04/23/25 1437   Wound packed? No 04/23/25 1437   Dressing Changed New 04/23/25 1437   Patient Tolerance Tolerated well 04/23/25 1437   Dressing Status Clean;Dry;Intact 04/23/25 1437       Wound 04/23/25 Other (Comments) Abdomen  Medial;Lower;Left (Active)   Wound Image   04/23/25 1438   Wound Description Beefy red 04/23/25 1438   Non-staged Wound Description Partial thickness 04/23/25 1438   Wound Length (cm) 2 cm 04/23/25 1438   Wound Width (cm) 2 cm 04/23/25 1438   Wound Depth (cm) 0.1 cm 04/23/25 1438   Wound Surface Area (cm^2) 4 cm^2 04/23/25 1438   Wound Volume (cm^3) 0.4 cm^3 04/23/25 1438   Calculated Wound Volume (cm^3) 0.4 cm^3 04/23/25 1438   Drainage Amount Scant 04/23/25 1438   Drainage Description Serosanguineous 04/23/25 1438   Marcel-wound Assessment Fragile 04/23/25 1438   Treatments Cleansed 04/23/25 1438   Wound Site Closure JOAO 04/23/25 1438   Dressing Hydrocolloid 04/23/25 1438   Wound packed? No 04/23/25 1438   Dressing Changed New 04/23/25 1438   Patient Tolerance Tolerated well 04/23/25 1438   Dressing Status Clean;Dry;Intact 04/23/25 1438               Lab Results   Component Value Date    HGBA1C 6.5 (H) 02/03/2025       Wound Instructions:  Orders Placed This Encounter   Procedures    Wound cleansing and dressings     Ostomy Care:  -Stoma Measurement: 1 inch by 1 1/2 inch    -Appliance Used During Bag Change: Convatec two piece cut to fit    *Can shower with pouch on or off. Make sure to dry off pouch after shower.     Pouch Change Steps:  1. Peel back pouch using push-pull method, may use non-alcohol adhesive remover. Remove pouch from top to bottom.   2. Use warm water only to cleanse skin around the stoma (marcel-stomal skin).   3. Make sure all adhesive residue is removed and skin is dry and not oily.  4. Measure stoma size using measuring guide and trace correct measurements onto back of pouch.  5. Then cut or mold the backing of pouch out to correct shape/size.  6. Use 3M no sting barrier film to prep the skin and place hydrocolloid over wounds.  7. Place pouch over stoma and onto skin.  8. Use warmth of hand to apply gentle pressure to help backing of pouch to adhere well to skin.    TIPS:  Empty pouch when 1/3  "-1/2 full.   Change pouch every 2-3 days or if signs of leaking.    Wound aggressively cleansed with normal saline and gauze.     Standing Status:   Future     Expiration Date:   4/30/2025           AR Balderrama, YG, ALDEN    Portions of the record may have been created with voice recognition software. Occasional wrong word or \"sound alike\" substitutions may have occurred due to the inherent limitations of voice recognition software. Read the chart carefully and recognize, using context, where substitutions have occurred.          Total time spent today:    Total time (face-to-face and non-face-to-face) spent on today's visit was 28 minutes. This includes preparation for the visits (i.e. reviewing test results from date recent hospitalizations, ER/Urgent Care/primary care visits and recent consultant office visits), performance of a medically appropriate history and examination, and orders for medications or testing.   "

## 2025-05-07 DIAGNOSIS — E11.65 TYPE 2 DIABETES MELLITUS WITH HYPERGLYCEMIA, WITHOUT LONG-TERM CURRENT USE OF INSULIN (HCC): ICD-10-CM

## 2025-05-12 ENCOUNTER — NURSE TRIAGE (OUTPATIENT)
Age: 80
End: 2025-05-12

## 2025-05-12 ENCOUNTER — HOSPITAL ENCOUNTER (EMERGENCY)
Facility: HOSPITAL | Age: 80
Discharge: HOME | End: 2025-05-12
Attending: EMERGENCY MEDICINE
Payer: MEDICARE

## 2025-05-12 VITALS
HEART RATE: 72 BPM | SYSTOLIC BLOOD PRESSURE: 117 MMHG | TEMPERATURE: 98.1 F | RESPIRATION RATE: 18 BRPM | OXYGEN SATURATION: 100 % | DIASTOLIC BLOOD PRESSURE: 59 MMHG

## 2025-05-12 DIAGNOSIS — E86.0 DEHYDRATION: ICD-10-CM

## 2025-05-12 DIAGNOSIS — N17.9 ACUTE KIDNEY INJURY (HCC): ICD-10-CM

## 2025-05-12 DIAGNOSIS — R19.7 DIARRHEA: Primary | ICD-10-CM

## 2025-05-12 DIAGNOSIS — B34.9 VIRAL SYNDROME: ICD-10-CM

## 2025-05-12 LAB
ALBUMIN SERPL BCG-MCNC: 3.6 G/DL (ref 3.5–5)
ALP SERPL-CCNC: 113 U/L (ref 34–104)
ALT SERPL W P-5'-P-CCNC: 21 U/L (ref 7–52)
ANION GAP SERPL CALCULATED.3IONS-SCNC: 9 MMOL/L (ref 4–13)
AST SERPL W P-5'-P-CCNC: 22 U/L (ref 13–39)
BACTERIA UR QL AUTO: ABNORMAL /HPF
BASOPHILS # BLD AUTO: 0.02 THOUSANDS/ÂΜL (ref 0–0.1)
BASOPHILS NFR BLD AUTO: 0 % (ref 0–1)
BILIRUB SERPL-MCNC: 0.88 MG/DL (ref 0.2–1)
BILIRUB UR QL STRIP: NEGATIVE
BUN SERPL-MCNC: 37 MG/DL (ref 5–25)
CALCIUM SERPL-MCNC: 8.3 MG/DL (ref 8.4–10.2)
CHLORIDE SERPL-SCNC: 106 MMOL/L (ref 96–108)
CLARITY UR: CLEAR
CO2 SERPL-SCNC: 21 MMOL/L (ref 21–32)
COLOR UR: YELLOW
CREAT SERPL-MCNC: 1.51 MG/DL (ref 0.6–1.3)
EOSINOPHIL # BLD AUTO: 0.1 THOUSAND/ÂΜL (ref 0–0.61)
EOSINOPHIL NFR BLD AUTO: 2 % (ref 0–6)
ERYTHROCYTE [DISTWIDTH] IN BLOOD BY AUTOMATED COUNT: 13.8 % (ref 11.6–15.1)
GFR SERPL CREATININE-BSD FRML MDRD: 42 ML/MIN/1.73SQ M
GLUCOSE SERPL-MCNC: 156 MG/DL (ref 65–140)
GLUCOSE UR STRIP-MCNC: NEGATIVE MG/DL
GRAN CASTS #/AREA URNS LPF: ABNORMAL /[LPF]
HCT VFR BLD AUTO: 43.9 % (ref 36.5–49.3)
HGB BLD-MCNC: 15.1 G/DL (ref 12–17)
HGB UR QL STRIP.AUTO: NEGATIVE
HYALINE CASTS #/AREA URNS LPF: ABNORMAL /LPF
IMM GRANULOCYTES # BLD AUTO: 0.02 THOUSAND/UL (ref 0–0.2)
IMM GRANULOCYTES NFR BLD AUTO: 0 % (ref 0–2)
KETONES UR STRIP-MCNC: NEGATIVE MG/DL
LEUKOCYTE ESTERASE UR QL STRIP: NEGATIVE
LIPASE SERPL-CCNC: 64 U/L (ref 11–82)
LYMPHOCYTES # BLD AUTO: 0.52 THOUSANDS/ÂΜL (ref 0.6–4.47)
LYMPHOCYTES NFR BLD AUTO: 12 % (ref 14–44)
MCH RBC QN AUTO: 32.3 PG (ref 26.8–34.3)
MCHC RBC AUTO-ENTMCNC: 34.4 G/DL (ref 31.4–37.4)
MCV RBC AUTO: 94 FL (ref 82–98)
MONOCYTES # BLD AUTO: 0.51 THOUSAND/ÂΜL (ref 0.17–1.22)
MONOCYTES NFR BLD AUTO: 11 % (ref 4–12)
MUCOUS THREADS UR QL AUTO: ABNORMAL
NEUTROPHILS # BLD AUTO: 3.33 THOUSANDS/ÂΜL (ref 1.85–7.62)
NEUTS SEG NFR BLD AUTO: 75 % (ref 43–75)
NITRITE UR QL STRIP: NEGATIVE
NON-SQ EPI CELLS URNS QL MICRO: ABNORMAL /HPF
NRBC BLD AUTO-RTO: 0 /100 WBCS
PH UR STRIP.AUTO: 5.5 [PH]
PLATELET # BLD AUTO: 218 THOUSANDS/UL (ref 149–390)
PMV BLD AUTO: 11.3 FL (ref 8.9–12.7)
POTASSIUM SERPL-SCNC: 3.4 MMOL/L (ref 3.5–5.3)
PROT SERPL-MCNC: 6.9 G/DL (ref 6.4–8.4)
PROT UR STRIP-MCNC: ABNORMAL MG/DL
RBC # BLD AUTO: 4.67 MILLION/UL (ref 3.88–5.62)
RBC #/AREA URNS AUTO: ABNORMAL /HPF
SODIUM SERPL-SCNC: 136 MMOL/L (ref 135–147)
SP GR UR STRIP.AUTO: 1.02 (ref 1–1.03)
UROBILINOGEN UR STRIP-ACNC: <2 MG/DL
WBC # BLD AUTO: 4.5 THOUSAND/UL (ref 4.31–10.16)
WBC #/AREA URNS AUTO: ABNORMAL /HPF

## 2025-05-12 PROCEDURE — 80053 COMPREHEN METABOLIC PANEL: CPT | Performed by: EMERGENCY MEDICINE

## 2025-05-12 PROCEDURE — 81001 URINALYSIS AUTO W/SCOPE: CPT | Performed by: EMERGENCY MEDICINE

## 2025-05-12 PROCEDURE — 85025 COMPLETE CBC W/AUTO DIFF WBC: CPT | Performed by: EMERGENCY MEDICINE

## 2025-05-12 PROCEDURE — 99285 EMERGENCY DEPT VISIT HI MDM: CPT | Performed by: EMERGENCY MEDICINE

## 2025-05-12 PROCEDURE — 36415 COLL VENOUS BLD VENIPUNCTURE: CPT

## 2025-05-12 PROCEDURE — 99284 EMERGENCY DEPT VISIT MOD MDM: CPT

## 2025-05-12 PROCEDURE — 83690 ASSAY OF LIPASE: CPT | Performed by: EMERGENCY MEDICINE

## 2025-05-12 NOTE — ED PROVIDER NOTES
ED Disposition       None          Assessment & Plan       Medical Decision Making     Reviewed past medical records: Yes, previous medical records reviewed, no recent admissions.     History Provided by: Patient and wife     Differential considered: Gastroenteritis, bowel obstruction, viral syndrome, GI bleed, appendicitis, cholecystitis     Consideration of tests: Patient has a benign abdominal exam.  His ostomy output is improving and trending back towards where he was before.  He does have an acute kidney injury.  He was offered admission IV hydration and management.  His preference is not to be hospital.  My suspicion for a surgical pathology in his abdomen is low given his benign exam, improving symptoms.  Wife confirms he is back to his near baseline.  Aware of the need for follow-up lab testing to ensure his kidney function is improving.  If not, will return to ED for any worsening symptoms or abnormal lab tests.  Patient feels comfortable contacting his primary care physician for help with follow-up results.       I have reviewed the patient's visit and any testing done in the emergency department.  They have verbalized their understanding of any testing done today and have no further questions or concerns regarding their care in the emergency room.  They will follow up with their primary care physician as well as with any specialist in their discharge instructions.  Strict return precautions were discussed.    Amount and/or Complexity of Data Reviewed  Labs: ordered. Decision-making details documented in ED Course.        ED Course as of 05/13/25 0817   Mon May 12, 2025   1334 Reviewed patient's blood test with him and his wife.  Aware of acute kidney injury and dehydration.  Symptoms of diarrhea have been improving.  He has adequate oral intake and has returning appetite.  Notes that he had lost flavor for foods that he was eating over the past week.  Possible that he had viral infection with flu or  COVID.  Reviewed options for management evaluation in the ED including CT scan of the abdomen pelvis.  These were declined.  Will check UA.  Patient aware of need for outpatient follow-up for his acute kidney injury.   1428 Ketones, UA: Negative       Medications - No data to display    ED Risk Strat Scores                    No data recorded        SBIRT 20yo+      Flowsheet Row Most Recent Value   Initial Alcohol Screen: US AUDIT-C     1. How often do you have a drink containing alcohol? 2 Filed at: 2025 1146   2. How many drinks containing alcohol do you have on a typical day you are drinking?  1 Filed at: 2025 1146   3b. FEMALE Any Age, or MALE 65+: How often do you have 4 or more drinks on one occassion? 0 Filed at: 2025 1146   Audit-C Score 3 Filed at: 2025 1140   FABI: How many times in the past year have you...    Used an illegal drug or used a prescription medication for non-medical reasons? Never Filed at: 2025 1141                            History of Present Illness       Chief Complaint   Patient presents with    Diarrhea     Pt c/o diarrhea that has been going on for about 1 week. Pt states that he has lost about 15 lbs in the past week. Pt denies and pain or vomiting. Pt states that he had a fever on Wednesday.        Past Medical History:   Diagnosis Date    Diabetes mellitus (HCC)     DVT of deep femoral vein, left (HCC) 2023    Hypertension       Past Surgical History:   Procedure Laterality Date    ELBOW SURGERY Left     pinched nerve      Family History   Problem Relation Age of Onset    Colon cancer Father       Social History     Tobacco Use    Smoking status: Former     Current packs/day: 0.00     Average packs/day: 1 pack/day for 50.0 years (50.0 ttl pk-yrs)     Types: Cigarettes     Start date:      Quit date: 2006     Years since quittin.3    Smokeless tobacco: Never   Vaping Use    Vaping status: Never Used   Substance Use Topics    Alcohol  use: Yes     Alcohol/week: 21.0 standard drinks of alcohol     Types: 21 Glasses of wine per week     Comment: social drinker    Drug use: Never      E-Cigarette/Vaping    E-Cigarette Use Never User       E-Cigarette/Vaping Substances    Nicotine No       I have reviewed and agree with the history as documented.     80-year-old male here with his wife for concerns of diarrhea.  Last week was having loose stool and then clear output from his ostomy.  Symptoms were worse several days ago but have been improving.  He states now he is back to just having mildly loose stool.  No blood currently in his output.  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  No fevers no chills no sick contacts at home.  No nausea or vomiting.       Diarrhea  Associated symptoms: no abdominal pain, no chills, no fever and no vomiting        Review of Systems   Constitutional: Negative.  Negative for chills and fever.   HENT: Negative.  Negative for rhinorrhea, sore throat, trouble swallowing and voice change.    Eyes: Negative.  Negative for pain and visual disturbance.   Respiratory: Negative.  Negative for cough, shortness of breath and wheezing.    Cardiovascular: Negative.  Negative for chest pain and palpitations.   Gastrointestinal:  Positive for diarrhea. Negative for abdominal pain, nausea and vomiting.   Genitourinary: Negative.  Negative for dysuria and frequency.   Musculoskeletal: Negative.  Negative for neck pain and neck stiffness.   Skin: Negative.  Negative for rash.   Neurological: Negative.  Negative for dizziness, speech difficulty, weakness, light-headedness and numbness.           Objective       ED Triage Vitals [05/12/25 1144]   Temperature Pulse Blood Pressure Respirations SpO2 Patient Position - Orthostatic VS   98.1 °F (36.7 °C) 85 105/57 18 98 % Sitting      Temp Source Heart Rate Source BP Location FiO2 (%) Pain Score    Temporal Monitor Left arm -- No Pain      Vitals      Date and Time Temp  Pulse SpO2 Resp BP Pain Score FACES Pain Rating User   05/12/25 1144 98.1 °F (36.7 °C) 85 98 % 18 105/57 No Pain -- RN            Physical Exam  Vitals and nursing note reviewed.   Constitutional:       General: He is not in acute distress.     Appearance: He is well-developed.   HENT:      Head: Normocephalic and atraumatic.   Eyes:      Conjunctiva/sclera: Conjunctivae normal.      Pupils: Pupils are equal, round, and reactive to light.   Neck:      Trachea: No tracheal deviation.   Cardiovascular:      Rate and Rhythm: Normal rate and regular rhythm.   Pulmonary:      Effort: Pulmonary effort is normal. No respiratory distress.      Breath sounds: Normal breath sounds. No wheezing or rales.   Abdominal:      General: Bowel sounds are normal. There is no distension.      Palpations: Abdomen is soft.      Tenderness: There is no abdominal tenderness. There is no guarding or rebound.      Comments: Ostomy present, no breakdown.  No bloody stool noted.   Musculoskeletal:         General: No tenderness or deformity. Normal range of motion.      Cervical back: Normal range of motion and neck supple.   Skin:     General: Skin is warm and dry.      Capillary Refill: Capillary refill takes less than 2 seconds.      Findings: No rash.   Neurological:      Mental Status: He is alert and oriented to person, place, and time.   Psychiatric:         Behavior: Behavior normal.         Results Reviewed       Procedure Component Value Units Date/Time    Comprehensive metabolic panel [[id number]]  (Abnormal) Collected: 05/12/25 1149    Lab Status: Final result Specimen: Blood from Arm, Right Updated: 05/12/25 1235     Sodium 136 mmol/L      Potassium 3.4 mmol/L      Chloride 106 mmol/L      CO2 21 mmol/L      ANION GAP 9 mmol/L      BUN 37 mg/dL      Creatinine 1.51 mg/dL      Glucose 156 mg/dL      Calcium 8.3 mg/dL      AST 22 U/L      ALT 21 U/L      Alkaline Phosphatase 113 U/L      Total Protein 6.9 g/dL      Albumin 3.6 g/dL       Total Bilirubin 0.88 mg/dL      eGFR 42 ml/min/1.73sq m     Narrative:      National Kidney Disease Foundation guidelines for Chronic Kidney Disease (CKD):     Stage 1 with normal or high GFR (GFR > 90 mL/min/1.73 square meters)    Stage 2 Mild CKD (GFR = 60-89 mL/min/1.73 square meters)    Stage 3A Moderate CKD (GFR = 45-59 mL/min/1.73 square meters)    Stage 3B Moderate CKD (GFR = 30-44 mL/min/1.73 square meters)    Stage 4 Severe CKD (GFR = 15-29 mL/min/1.73 square meters)    Stage 5 End Stage CKD (GFR <15 mL/min/1.73 square meters)  Note: GFR calculation is accurate only with a steady state creatinine    Lipase [[id number]]  (Normal) Collected: 05/12/25 1149    Lab Status: Final result Specimen: Blood from Arm, Right Updated: 05/12/25 1235     Lipase 64 u/L     CBC and differential [[id number]]  (Abnormal) Collected: 05/12/25 1149    Lab Status: Final result Specimen: Blood from Arm, Right Updated: 05/12/25 1221     WBC 4.50 Thousand/uL      RBC 4.67 Million/uL      Hemoglobin 15.1 g/dL      Hematocrit 43.9 %      MCV 94 fL      MCH 32.3 pg      MCHC 34.4 g/dL      RDW 13.8 %      MPV 11.3 fL      Platelets 218 Thousands/uL      nRBC 0 /100 WBCs      Segmented % 75 %      Immature Grans % 0 %      Lymphocytes % 12 %      Monocytes % 11 %      Eosinophils Relative 2 %      Basophils Relative 0 %      Absolute Neutrophils 3.33 Thousands/µL      Absolute Immature Grans 0.02 Thousand/uL      Absolute Lymphocytes 0.52 Thousands/µL      Absolute Monocytes 0.51 Thousand/µL      Eosinophils Absolute 0.10 Thousand/µL      Basophils Absolute 0.02 Thousands/µL     UA w Reflex to Microscopic w Reflex to Culture [[id number]]     Lab Status: No result Specimen: Urine             No orders to display       Procedures    ED Medication and Procedure Management   Prior to Admission Medications   Prescriptions Last Dose Informant Patient Reported? Taking?   Calcium Carb-Cholecalciferol (CALCIUM 1000 + D PO)  Self Yes No   Sig:  Take by mouth 1 daily   Cholecalciferol (Vitamin D3) 1.25 MG (75307 UT) CAPS  Self Yes No   Sig: Take by mouth 1 daily   Contour Next Test test strip  Self No No   Sig: USE 1 STRIP TO CHECK GLUCOSE ONCE DAILY   Contour Next Test test strip   No No   Sig: USE 1 STRIP TO CHECK GLUCOSE ONCE DAILY DX:E11.9   Lancet Devices (Microlet Next Lancing Device) MISC  Self No No   Sig: Inject under the skin in the morning   Microlet Lancets MISC  Self No No   Sig: USE 1 NEW LANCET TO CHECK GLUCOSE ONCE DAILY   apixaban (Eliquis) 5 mg  Self No No   Sig: Take 1 tablet (5 mg total) by mouth 2 (two) times a day   clobetasol (TEMOVATE) 0.05 % ointment   Yes No   Sig: APPLY OINTMENT TOPICALLY TO AFFECTED AREA TWICE DAILY FOR 2 WEEKS. TAKE 1 WEEK OFF THEN REPEAT   dorzolamide-timolol (COSOPT) 22.3-6.8 MG/ML ophthalmic solution  Self Yes No   Sig: INSTILL 1 DROP INTO EACH EYE TWICE DAILY   glipiZIDE (GLUCOTROL XL) 2.5 mg 24 hr tablet   No No   Sig: Take 1 tablet (2.5 mg total) by mouth daily   hydroCHLOROthiazide 25 mg tablet   No No   Sig: Take 1 tablet by mouth once daily   lisinopril (ZESTRIL) 20 mg tablet   No No   Sig: Take 1 tablet by mouth once daily   lovastatin (MEVACOR) 20 mg tablet   No No   Sig: Take 1 tablet (20 mg total) by mouth daily   mupirocin (BACTROBAN) 2 % ointment  Self No No   Sig: Apply topically 3 (three) times a day as needed (skin infection)   potassium chloride (Klor-Con M20) 20 mEq tablet   No No   Sig: Take 1 tablet (20 mEq total) by mouth daily      Facility-Administered Medications: None     Patient's Medications   Discharge Prescriptions    No medications on file     No discharge procedures on file.  ED SEPSIS DOCUMENTATION            Kendrick Ruvalcaba DO  05/13/25 0819

## 2025-05-12 NOTE — DISCHARGE INSTRUCTIONS
As discussed, please have a repeat chemistry panel blood draw done in the next 24 to 48 hours.  This is to assess your kidney function.  If it is worsening you will need to come back to the hospital.  If you have concerns for any reason please return for further evaluation.

## 2025-05-12 NOTE — TELEPHONE ENCOUNTER
"FOLLOW UP: Wife will take patient to emergency room for further evaluation    REASON FOR CONVERSATION: Diarrhea    SYMPTOMS: Moderate-severe diarrhea for 1 week, not improving. Lost 12 lbs so far. Fever 101.3 last week, since resolved    OTHER: Patient also has wounds under ostomy wafers they are currently treating    DISPOSITION: Go to ED/UCC Now (Or to Office with PCP Approval)      Reason for Disposition   SEVERE diarrhea (e.g., 7 or more times / day more than normal) and age > 60 years    Answer Assessment - Initial Assessment Questions  1. DIARRHEA SEVERITY: \"How bad is the diarrhea?\" \"How many more stools have you had in the past 24 hours than normal?\"       Pretty severe, at least 6 stools more than normal if not more  2. ONSET: \"When did the diarrhea begin?\"       Last Monday evening  3. STOOL DESCRIPTION:  \"How loose or watery is the diarrhea?\" \"What is the stool color?\" \"Is there any blood or mucous in the stool?\"      Watery, occasionally blood but not unusual for patient with ostomy  4. VOMITING: \"Are you also vomiting?\" If Yes, ask: \"How many times in the past 24 hours?\"       denies  5. ABDOMEN PAIN: \"Are you having any abdomen pain?\" If Yes, ask: \"What does it feel like?\" (e.g., crampy, dull, intermittent, constant)       Very mild pain last week but none since  6. ABDOMEN PAIN SEVERITY: If present, ask: \"How bad is the pain?\"  (e.g., Scale 1-10; mild, moderate, or severe)      N/a  7. ORAL INTAKE: If vomiting, \"Have you been able to drink liquids?\" \"How much liquids have you had in the past 24 hours?\"      Trying best to drink lots of water  8. HYDRATION: \"Any signs of dehydration?\" (e.g., dry mouth [not just dry lips], too weak to stand, dizziness, new weight loss) \"When did you last urinate?\"      Dry mouth. Lost 12 lbs so far in one week  9. EXPOSURE: \"Have you traveled to a foreign country recently?\" \"Have you been exposed to anyone with diarrhea?\" \"Could you have eaten any food that was " "spoiled?\"      denies  10. ANTIBIOTIC USE: \"Are you taking antibiotics now or have you taken antibiotics in the past 2 months?\"        denies  11. OTHER SYMPTOMS: \"Do you have any other symptoms?\" (e.g., fever, blood in stool)        Fever last week 101.3  12. PREGNANCY: \"Is there any chance you are pregnant?\" \"When was your last menstrual period?\"        N/A    Protocols used: Diarrhea-Adult-[place]    "

## 2025-05-12 NOTE — TELEPHONE ENCOUNTER
Regarding: Diarrhea and weight lose  ----- Message from Yoselyn MORELOS sent at 5/12/2025  9:51 AM EDT -----  Pt's spouse stating patient lost 15 lbs in last week.  He's had diarrhea for a week.  He had an Ostomy.  They wanted appt today and some sort of prescription anti diarrhea that they can get.  He's been taking Imodium AD but not helping.  No same days.

## 2025-05-13 ENCOUNTER — TELEPHONE (OUTPATIENT)
Age: 80
End: 2025-05-13

## 2025-05-13 ENCOUNTER — OFFICE VISIT (OUTPATIENT)
Facility: HOSPITAL | Age: 80
End: 2025-05-13
Payer: MEDICARE

## 2025-05-13 DIAGNOSIS — C34.90 NSCLC METASTATIC TO BONE (HCC): ICD-10-CM

## 2025-05-13 DIAGNOSIS — I10 ESSENTIAL HYPERTENSION: Primary | ICD-10-CM

## 2025-05-13 DIAGNOSIS — Z71.89 ENCOUNTER FOR OSTOMY NURSE CONSULTATION: ICD-10-CM

## 2025-05-13 DIAGNOSIS — C79.51 NSCLC METASTATIC TO BONE (HCC): ICD-10-CM

## 2025-05-13 DIAGNOSIS — E11.65 TYPE 2 DIABETES MELLITUS WITH HYPERGLYCEMIA, WITHOUT LONG-TERM CURRENT USE OF INSULIN (HCC): ICD-10-CM

## 2025-05-13 DIAGNOSIS — Z93.3 COLOSTOMY IN PLACE (HCC): Primary | ICD-10-CM

## 2025-05-13 DIAGNOSIS — S31.109A OPEN WOUND OF ABDOMEN, INITIAL ENCOUNTER: ICD-10-CM

## 2025-05-13 PROCEDURE — 99213 OFFICE O/P EST LOW 20 MIN: CPT | Performed by: NURSE PRACTITIONER

## 2025-05-13 NOTE — TELEPHONE ENCOUNTER
Patient's wife called stating she would like Dr. Arevalo be aware of the blood test results and watch that his kidney numbers go down.  Patient went to the ER with diarrhea issues.  The ER doctor ordered blood work due the patient  having kidney problems.  She would like PCP to watch his Kreatin numbers to see if they are going up or down.  Please advise.

## 2025-05-13 NOTE — PATIENT INSTRUCTIONS
Wound cleansing and dressings       Ostomy Care:  -Stoma Measurement: 1 1/8 inch Length by 1 1/4 Inches Width      -Appliance Used During Bag Change: Convatec two piece cut to fit     *Can shower with pouch on or off. Make sure to dry off pouch after shower.      Pouch Change Steps:  1. Peel back pouch using push-pull method, may use non-alcohol adhesive remover. Remove pouch from top to bottom.   2. Use warm water only to cleanse skin around the stoma (marcel-stomal skin).   3. Make sure all adhesive residue is removed and skin is dry and not oily.  4. Measure stoma size using measuring guide and trace correct measurements onto back of pouch.  5. Then cut or mold the backing of pouch out to correct shape/size.  6. Use 3M no sting barrier film to prep the intact skin.   7. Apply calcium alginate to wound beds and cover with hydrocolloid dressings (cut ostomy size out onto hydrocolloid and apply around ostomy, cover any other wounds with additional hydrocolloid dressings)  7. Place pouch over stoma and onto skin.  8. Mold 2 inch elyse ring circumferentially around stoma and place on top of hydrocolloid.  9. Use warmth of hand to apply gentle pressure to help backing of pouch to adhere well to skin.  10. Apply Brava Barrier Extenders circumferentially around pouch wafer.  11. Apply ostomy belt.      TIPS:  Empty pouch when 1/3 -1/2 full.   Change pouch every 2-3 days or if signs of leaking.

## 2025-05-13 NOTE — PROGRESS NOTES
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  F/U visit.  Wound cleansed with normal saline and gauze.  Wound measuring larger due to increased partial thickness peristomal skin breakdown.  Wounds not showing any signs or symptoms of infection.  Wounds appear to be draining a small to moderate amount of drainage.  Stoma is retracted measuring 1-1/8 x 1-1/4 inches.  Peristomal area is bulging secondary to presence of peristomal hernia    2.  Will add calcium alginate to treatment plan underneath hydrocolloid.  Will isolate wounds with large hydrocolloid dressing.  Continue barrier ring underneath ostomy appliance.  Ostomy belt was ordered today to help patient maintain better adherence of his ostomy appliance.  Dressing and ostomy appliance is to be changed every 3 days    3.  A1C results reviewed with the patient today.  Patient maintaining tight glycemic control    4.  Patient and wife verbalized agreement and understanding with plan of care    History of Present Illness   Chief Complaint   Patient presents with    Follow Up Wound Care Visit     Kiarra-stomal Wound   Here for wound follow up.  F/u visit for peristomal skin breakdown.  Patient's wife who is present with patient today reports they have been experiencing leakage of drainage from underneath the hydrocolloid's.  She is concerned that the  hydrocolloid's that were ordered were too thick and were preventing the ostomy appliance for maintaining good adherence.  She reports patient has been able to maintain 2 to 3 days wear time of his ostomy appliance.  They deny any issues with leakage of stool from underneath the ostomy appliance.  Patient reports intense pain in the area of his wounds especially with cleansing and removal of the ostomy appliance.  He denies any fevers or chills      Objective   There were no vitals taken for this visit.    Physical Exam  Vitals and nursing note reviewed.   Constitutional:       General: He is not in acute distress.     Appearance: Normal appearance. He is obese.   HENT:      Head: Normocephalic and atraumatic.   Eyes:      General:         Right eye: No discharge.         Left eye: No discharge.   Pulmonary:      Effort: Pulmonary effort is normal. No respiratory distress.   Abdominal:      General: The ostomy site is clean.      Hernia: A hernia is present.       Musculoskeletal:      Cervical back: Normal range of motion and neck supple. No rigidity.      Right lower leg: No edema.      Left lower leg: No edema.   Skin:     General: Skin is warm and dry.      Findings: Wound present. No erythema.   Neurological:      General: No focal deficit present.      Mental Status: He is alert and oriented to person, place, and time. Mental status is at baseline.   Psychiatric:         Mood and Affect: Mood normal.         Behavior: Behavior normal.         Thought Content: Thought content normal.         Judgment: Judgment normal.       Wound 04/23/25 Other (Comments) Abdomen Left;Medial;Lower;Superior (Active)   Wound Image   04/23/25 1437   Wound Description Pink 04/23/25 1437   Non-staged Wound Description Partial thickness 04/23/25 1437   Wound Length (cm) 2 cm 04/23/25 1437   Wound Width (cm) 9.5 cm 04/23/25 1437   Wound Depth (cm) 0.1 cm 04/23/25 1437   Wound Surface Area (cm^2) 19 cm^2 04/23/25 1437   Wound Volume  (cm^3) 1.9 cm^3 04/23/25 1437   Calculated Wound Volume (cm^3) 1.9 cm^3 04/23/25 1437   Drainage Amount Scant 04/23/25 1437   Drainage Description Serosanguineous 04/23/25 1437   Kiarra-wound Assessment Fragile 04/23/25 1437   Treatments Cleansed 04/23/25 1437   Wound packed? No 04/23/25 1437   Dressing Changed New 04/23/25 1437   Patient Tolerance Tolerated well 04/23/25 1437   Dressing Status Clean;Dry;Intact 04/23/25 1437       Wound 04/23/25 Other (Comments) Abdomen Medial;Lower;Left (Active)   Wound Image   05/13/25 0927   Wound Description Beefy red 04/23/25 1438   Non-staged Wound Description Partial thickness 04/23/25 1438   Wound Length (cm) 10.5 cm 05/13/25 0927   Wound Width (cm) 12 cm 05/13/25 0927   Wound Depth (cm) 0.1 cm 05/13/25 0927   Wound Surface Area (cm^2) 126 cm^2 05/13/25 0927   Wound Volume (cm^3) 12.6 cm^3 05/13/25 0927   Calculated Wound Volume (cm^3) 12.6 cm^3 05/13/25 0927   Change in Wound Size % -3050 05/13/25 0927   Drainage Amount Scant 04/23/25 1438   Drainage Description Serosanguineous 04/23/25 1438   Kiarra-wound Assessment Fragile 04/23/25 1438   Treatments Cleansed 04/23/25 1438   Wound Site Closure JOAO 04/23/25 1438   Dressing Hydrocolloid 04/23/25 1438   Wound packed? No 04/23/25 1438   Dressing Changed New 04/23/25 1438   Patient Tolerance Tolerated well 04/23/25 1438   Dressing Status Clean;Dry;Intact 04/23/25 1438

## 2025-05-13 NOTE — TELEPHONE ENCOUNTER
Wife notified. She is currently driving and needs to check her schedule. She will call back to set up ov when she gets home. BRIJESH

## 2025-05-14 ENCOUNTER — RA CDI HCC (OUTPATIENT)
Dept: OTHER | Facility: HOSPITAL | Age: 80
End: 2025-05-14

## 2025-05-14 ENCOUNTER — HOSPITAL ENCOUNTER (OUTPATIENT)
Dept: INFUSION CENTER | Facility: HOSPITAL | Age: 80
Discharge: HOME | End: 2025-05-14
Payer: MEDICARE

## 2025-05-14 DIAGNOSIS — C34.90 NSCLC METASTATIC TO BONE (HCC): Primary | ICD-10-CM

## 2025-05-14 DIAGNOSIS — N17.9 ACUTE KIDNEY INJURY (HCC): ICD-10-CM

## 2025-05-14 DIAGNOSIS — C79.51 NSCLC METASTATIC TO BONE (HCC): Primary | ICD-10-CM

## 2025-05-14 LAB
ALBUMIN SERPL BCG-MCNC: 3.6 G/DL (ref 3.5–5)
ALP SERPL-CCNC: 117 U/L (ref 34–104)
ALT SERPL W P-5'-P-CCNC: 23 U/L (ref 7–52)
ANION GAP SERPL CALCULATED.3IONS-SCNC: 7 MMOL/L (ref 4–13)
AST SERPL W P-5'-P-CCNC: 26 U/L (ref 13–39)
BILIRUB SERPL-MCNC: 0.69 MG/DL (ref 0.2–1)
BUN SERPL-MCNC: 27 MG/DL (ref 5–25)
CALCIUM SERPL-MCNC: 8.5 MG/DL (ref 8.4–10.2)
CHLORIDE SERPL-SCNC: 105 MMOL/L (ref 96–108)
CO2 SERPL-SCNC: 26 MMOL/L (ref 21–32)
CREAT SERPL-MCNC: 0.88 MG/DL (ref 0.6–1.3)
GFR SERPL CREATININE-BSD FRML MDRD: 80 ML/MIN/1.73SQ M
GLUCOSE SERPL-MCNC: 135 MG/DL (ref 65–140)
POTASSIUM SERPL-SCNC: 3.5 MMOL/L (ref 3.5–5.3)
PROT SERPL-MCNC: 6.8 G/DL (ref 6.4–8.4)
SODIUM SERPL-SCNC: 138 MMOL/L (ref 135–147)

## 2025-05-14 PROCEDURE — 80053 COMPREHEN METABOLIC PANEL: CPT | Performed by: EMERGENCY MEDICINE

## 2025-05-14 NOTE — PROGRESS NOTES
Octavio Atkins arrived for port flush. When asked about lab work patient stated he should have lab orders from PCP and ED doctor. RN looked over PCP and ED notes- BMP & CMP ordered separately in same time range. Patient educated about lab work and CMP drawn. Patient tolerated treatment well with no complications.      Octavio Atkins is aware of future appt on 5/27 at 9 am.     AVS printed and given to Octavio Atkins:  No (Declined by Octavio Atkins)

## 2025-05-15 ENCOUNTER — OFFICE VISIT (OUTPATIENT)
Dept: FAMILY MEDICINE CLINIC | Facility: HOSPITAL | Age: 80
End: 2025-05-15
Payer: MEDICARE

## 2025-05-15 VITALS
BODY MASS INDEX: 32.37 KG/M2 | HEART RATE: 79 BPM | SYSTOLIC BLOOD PRESSURE: 110 MMHG | HEIGHT: 72 IN | RESPIRATION RATE: 16 BRPM | OXYGEN SATURATION: 95 % | WEIGHT: 239 LBS | DIASTOLIC BLOOD PRESSURE: 60 MMHG

## 2025-05-15 DIAGNOSIS — I10 ESSENTIAL HYPERTENSION: ICD-10-CM

## 2025-05-15 DIAGNOSIS — H25.9 SENILE CATARACT OF RIGHT EYE, UNSPECIFIED AGE-RELATED CATARACT TYPE: Primary | ICD-10-CM

## 2025-05-15 DIAGNOSIS — Z99.89 DEPENDENCE ON CANE: ICD-10-CM

## 2025-05-15 DIAGNOSIS — E11.65 TYPE 2 DIABETES MELLITUS WITH HYPERGLYCEMIA, WITHOUT LONG-TERM CURRENT USE OF INSULIN (HCC): ICD-10-CM

## 2025-05-15 DIAGNOSIS — Z86.718 HISTORY OF DVT (DEEP VEIN THROMBOSIS): ICD-10-CM

## 2025-05-15 DIAGNOSIS — N17.9 AKI (ACUTE KIDNEY INJURY) (HCC): ICD-10-CM

## 2025-05-15 PROCEDURE — 99214 OFFICE O/P EST MOD 30 MIN: CPT | Performed by: INTERNAL MEDICINE

## 2025-05-15 PROCEDURE — G2211 COMPLEX E/M VISIT ADD ON: HCPCS | Performed by: INTERNAL MEDICINE

## 2025-05-15 NOTE — PROGRESS NOTES
Pre-operative Clearance  Name: Octavio Atkins      : 1945      MRN: [id number]  Encounter Provider: Catherine Arevalo MD  Encounter Date: 5/15/2025   Encounter department: [facility] PRIMARY CARE [address]    :  Assessment & Plan  Senile cataract of right eye, unspecified age-related cataract type  Patient may undergo the right eye cataract surgery at an [facility] by Dr. Jarertt Caicedo on   He is low cardiovascular risk he will continue same medications       CATALINO (acute kidney injury) (Formerly [facility])  Patient developed nausea diarrhea after eating clams last Gabriel night.  He went to the ER on May 3.  He received IV fluids because he had acute kidney injury and he felt much better so he was discharged home.  He has had no diarrhea since then.  He denies nausea, abdominal pain, vomiting, signs of GI bleeding.  His appetite is back to normal.    He had repeat blood work done yesterday that showed resolution of acute kidney injury, his renal function returned to baseline.    He most likely has an enteritis due to the food he ate last Gabriel night.  I asked him to consume a bland diet avoiding alcohol, soda, spicy foods.  Patient was in agreement with this.  He will call me if he develops diarrhea again       Type 2 diabetes mellitus with hyperglycemia, without long-term current use of insulin (Formerly [facility])    Lab Results   Component Value Date    HGBA1C 6.5 (H) 2025   Patient has type 2 diabetes mellitus.  He checks his blood sugar sometimes in the morning sometimes in the evening once a day.  A1c was acceptable at 6.5 in February.  He will continue glipizide         History of DVT (deep vein thrombosis)  He has history of DVT.  He was started on Eliquis.  He denies signs of GI or  bleeding.  He will continue Eliquis       Essential hypertension  He has hypertension.  Blood pressure was controlled today.  He had no orthostatic hypotension.  He is electrolytes were normal  yesterday.dehydration resolved by today.  He will continue lisinopril and hydrochlorothiazide       Dependence on cane  He is dependent on a cane to avoid falls.  He will continue using a cane.           Pre-operative Clearance:     Revised Cardiac Risk Index:  RCI RISK CLASS I (0 risk factors, risk of major cardiac complications approximately 0.5%)    Clearance:  Patient is medically optimized (CLEARED) for proposed surgery without any additional cardiac testing.      Medication Instructions:   - ACE Inhibitors or ARBs: Continue this medication  - Direct Xa Inhibitor (ie, Eliquis, Xarelto): Continue taking Eliquis  - Diuretics: Continue taking this medication up to the evening before surgery/procedure, but do not take in the morning of the day of surgery/procedure.  - Hyperlipidemia meds: Continue to take this medication on your normal schedule.      Medicine Instructions for Adults with Diabetes (NO Bowel Prep)    Follow these instructions when a BOWEL PREP is NOT required for your procedure or surgery!    NOTE:  GLP Agonists taken weekly: do not take in the 7 days before your procedure. **Bariatric surgery: do not take 4 weeks prior to your procedure.    SGLT-2 Inhibitors: do not take in the 4 days before your procedure    On the Day Before Surgery/Procedure  If you are having a procedure (e.g., Colonoscopy) or surgery which DOES NOT require a bowel prep, follow the directions below based on the type of medicine you take for your diabetes.  Type of Medicine You Take Examples What to Do   Pre-Mixed Insulin Intermediate  Yitqgtq34/25, Ekkzenk25/30, Novolog 70/30, Regular Insulin Take 1/2 your regular dose the evening before our procedure.   Rapid/Fast Acting  Insulin and/or Long-Acting Insulin Humalog U200, NovoLog, Apidra,  Lantus, Levemir, Tresiba, Toujeo,  Fias, Basaglar Take your FULL regular dose the day before procedure.   Oral Diabetic Medicines (sulfonylurea) Glipizide/Glimepiride/  Glucotrol Take your  regular dose with dinner the evening before your procedure.   Other Oral Diabetic Medicines Metformin, Glucophage, Glucophage  XR, Riomet, Glumetza, Actose,  Avandia, Gl set, Prandin Take your regular dose with dinner the evening before your procedure   GLP Agonists Adlyxin, Byetta, Bydureon,  Ozempic, Soliqua, Tanzeum,  Trulicity, Victoza, Saxenda,  Rybelsus, Wegovy, Mounjaro, Zepbound If taken daily, take as normal  If taken weekly, do not take this medicine for 7 days before your procedure including the day of the procedure (resume taking after the procedure). **Bariatric surgery: do not take 4 weeks prior to procedure   SGLT-2 Inhibitors Jardiance, Invokana, Farxiga, Steglatro, Brenzavvy, Qtern, Segluromet Glyxambi, Synjardy, Synjardy XR, Invokamet, InvokametXR, Trijary XR, Xigduo X Do not take for 4 days before your procedure including the day of the procedure (resume taking after the procedure)   This educational material has been approved by the Patient Education Advisory Committee.    On the Day of Surgery/Procedure  Follow the directions below based on the type of medicine you take for your diabetes.  Type of Medicine You Take  Examples What to Do   Long-Acting Insulin Lantus, Levemir, Tresiba,  Toujeo, Basaglar, Semglee If you normally take your Long Acting Insulin in the morning, take the full dose as scheduled.   GLP-I Agonists Adlyxin, Byetta, Bydureon,  Ozempic, Soliqua, Tanzeum,  Trulicity, Victoza, Saxenda,  Rybelsus, Mounjaro Do NOT take this medicine on the day of your procedure (resume taking after the procedure)   Except for the morning Long-Acting Insulin, DO NOT take ANY diabetic medicine on the day of your procedure unless you were instructed by the doctor who manages your diabetes medicines.  Continue to check your blood sugars.  If you have an insulin pump, ask your endocrinologist for instructions at least 3 days before your procedure. NOTE: If you are not able to ask your endocrinologist  in advance, on the day of the procedure set your insulin pump to your basal rate only. Bring your insulin pump supplies to the hospital.         History of Present Illness     Pre-Op Examination     Surgery: Right eye cataract surgery   Anticipated Date of Surgery:    Surgeon: Dr. Jarrett Caicedo     Assessment of Cardiac Risk:   - Unstable or severe angina or MI in the last 6 weeks or history of stent placement in the last year?: No    - Decompensated heart failure (e.g. New onset heart failure, NYHA  Class IV heart failure, or worsening existing heart failure)?: No    - Significant arrhythmias such as high grade AV block, symptomatic ventricular arrhythmia, newly recognized ventricular tachycardia, supraventricular tachycardia with resting heart rate >100, or symptomatic bradycardia?: No    - Severe heart valve disease including aortic stenosis or symptomatic mitral stenosis?: No      Pre-operative Risk Factors:  - Elevated-risk surgery: No    - History of cerebrovascular disease: No    - History of ischemic heart disease: No    - History of congestive heart failure: No    - Pre-operative treatment with insulin: No    - Pre-operative creatinine >2 mg/dL: No      Review of Systems  Past Medical History   Past Medical History:   Diagnosis Date    Diabetes mellitus (HCC)     DVT of deep femoral vein, left (HCC) 2023    Hypertension      Past Surgical History:   Procedure Laterality Date    ELBOW SURGERY Left     pinched nerve     Family History   Problem Relation Age of Onset    Colon cancer Father      Social History     Tobacco Use    Smoking status: Former     Current packs/day: 0.00     Average packs/day: 1 pack/day for 50.0 years (50.0 ttl pk-yrs)     Types: Cigarettes     Start date:      Quit date:      Years since quittin.3    Smokeless tobacco: Never   Vaping Use    Vaping status: Never Used   Substance and Sexual Activity    Alcohol use: Yes     Alcohol/week: 21.0 standard drinks  "of alcohol     Types: 21 Glasses of wine per week     Comment: social drinker    Drug use: Never    Sexual activity: Not Currently     Partners: Female     Medications[1]  Allergies   Allergen Reactions    Aspirin Hives    Ibuprofen Hives     Objective   /60 (BP Location: Left arm, Patient Position: Standing)   Pulse 79   Resp 16   Ht 5' 11.5\" (1.816 m)   Wt 108 kg (239 lb)   SpO2 95%   BMI 32.87 kg/m²     Physical Exam  Constitutional:       General: He is not in acute distress.     Appearance: He is not toxic-appearing.   HENT:      Head: Normocephalic.     Eyes:      Conjunctiva/sclera: Conjunctivae normal.       Cardiovascular:      Rate and Rhythm: Normal rate and regular rhythm.      Heart sounds: No murmur heard.     No gallop.   Pulmonary:      Effort: No respiratory distress.      Breath sounds: No wheezing or rales.   Abdominal:      General: There is no distension.      Palpations: Abdomen is soft.      Tenderness: There is no abdominal tenderness. There is no guarding.     Skin:     General: Skin is warm and dry.     Neurological:      Mental Status: He is alert.      Cranial Nerves: No cranial nerve deficit.      Gait: Gait abnormal.     Psychiatric:         Mood and Affect: Mood normal.           Catherine Arevalo MD       [1]   Current Outpatient Medications on File Prior to Visit   Medication Sig    apixaban (Eliquis) 5 mg Take 1 tablet (5 mg total) by mouth 2 (two) times a day    Calcium Carb-Cholecalciferol (CALCIUM 1000 + D PO) Take by mouth 1 daily    Cholecalciferol (Vitamin D3) 1.25 MG (09879 UT) CAPS Take by mouth 1 daily    clobetasol (TEMOVATE) 0.05 % ointment     Contour Next Test test strip USE 1 STRIP TO CHECK GLUCOSE ONCE DAILY    Contour Next Test test strip USE 1 STRIP TO CHECK GLUCOSE ONCE DAILY DX:E11.9    dorzolamide-timolol (COSOPT) 22.3-6.8 MG/ML ophthalmic solution     glipiZIDE (GLUCOTROL XL) 2.5 mg 24 hr tablet Take 1 tablet (2.5 mg total) by mouth daily    " hydroCHLOROthiazide 25 mg tablet Take 1 tablet by mouth once daily    Lancet Devices (Microlet Next Lancing Device) MISC Inject under the skin in the morning    lisinopril (ZESTRIL) 20 mg tablet Take 1 tablet by mouth once daily    lovastatin (MEVACOR) 20 mg tablet Take 1 tablet (20 mg total) by mouth daily    Microlet Lancets MISC USE 1 NEW LANCET TO CHECK GLUCOSE ONCE DAILY    mupirocin (BACTROBAN) 2 % ointment Apply topically 3 (three) times a day as needed (skin infection)    potassium chloride (Klor-Con M20) 20 mEq tablet Take 1 tablet (20 mEq total) by mouth daily

## 2025-05-15 NOTE — ASSESSMENT & PLAN NOTE
Lab Results   Component Value Date    HGBA1C 6.5 (H) 02/03/2025   Patient has type 2 diabetes mellitus.  He checks his blood sugar sometimes in the morning sometimes in the evening once a day.  A1c was acceptable at 6.5 in February.  He will continue glipizide

## 2025-05-15 NOTE — ASSESSMENT & PLAN NOTE
He has hypertension.  Blood pressure was controlled today.  He had no orthostatic hypotension.  He is electrolytes were normal yesterday.dehydration resolved by today.  He will continue lisinopril and hydrochlorothiazide

## 2025-05-15 NOTE — ASSESSMENT & PLAN NOTE
He has history of DVT.  He was started on Eliquis.  He denies signs of GI or  bleeding.  He will continue Eliquis

## 2025-05-19 NOTE — PLAN OF CARE
Problem: Knowledge Deficit  Goal: Patient/family/caregiver demonstrates understanding of disease process, treatment plan, medications, and discharge instructions  Description: Complete learning assessment and assess knowledge base    Interventions:  - Provide teaching at level of understanding  - Provide teaching via preferred learning methods  Outcome: Progressing
Discharged

## 2025-05-24 NOTE — PLAN OF CARE
Problem: Knowledge Deficit  Goal: Patient/family/caregiver demonstrates understanding of disease process, treatment plan, medications, and discharge instructions  Description: Complete learning assessment and assess knowledge base    Interventions:  - Provide teaching at level of understanding  - Provide teaching via preferred learning methods  Outcome: Progressing English

## 2025-05-27 ENCOUNTER — HOSPITAL ENCOUNTER (OUTPATIENT)
Dept: INFUSION CENTER | Facility: HOSPITAL | Age: 80
Discharge: HOME | End: 2025-05-27
Payer: MEDICARE

## 2025-05-27 ENCOUNTER — TELEPHONE (OUTPATIENT)
Age: 80
End: 2025-05-27

## 2025-05-27 DIAGNOSIS — C79.51 NSCLC METASTATIC TO BONE (HCC): Primary | ICD-10-CM

## 2025-05-27 DIAGNOSIS — C34.90 NSCLC METASTATIC TO BONE (HCC): Primary | ICD-10-CM

## 2025-05-27 PROCEDURE — 96523 IRRIG DRUG DELIVERY DEVICE: CPT

## 2025-05-27 NOTE — TELEPHONE ENCOUNTER
Pt wife called for ostomy supplies that [employer] faxed over. Pt is running out of supplies, pouches and ostomy appliance. Please advise to Bobbi

## 2025-05-27 NOTE — PROGRESS NOTES
Octavio Atkins  tolerated treatment well with no complications.      Octavio Atkins is aware of future appt on 7/21 at 9am.     AVS printed and given to Octavio Atkins:  No (Declined by Octavio Atkins)

## 2025-05-29 ENCOUNTER — OFFICE VISIT (OUTPATIENT)
Facility: HOSPITAL | Age: 80
End: 2025-05-29
Payer: MEDICARE

## 2025-05-29 DIAGNOSIS — E11.65 TYPE 2 DIABETES MELLITUS WITH HYPERGLYCEMIA, WITHOUT LONG-TERM CURRENT USE OF INSULIN (HCC): ICD-10-CM

## 2025-05-29 DIAGNOSIS — S31.109A OPEN WOUND OF ABDOMEN, INITIAL ENCOUNTER: ICD-10-CM

## 2025-05-29 DIAGNOSIS — Z71.89 ENCOUNTER FOR OSTOMY NURSE CONSULTATION: ICD-10-CM

## 2025-05-29 DIAGNOSIS — Z93.3 COLOSTOMY IN PLACE (HCC): Primary | ICD-10-CM

## 2025-05-29 PROCEDURE — 99213 OFFICE O/P EST LOW 20 MIN: CPT

## 2025-05-29 NOTE — PATIENT INSTRUCTIONS
Ostomy Care:  -Stoma Measurement: 1 1/8 inch Length by 1 1/4 Inches Width      -Appliance Used During Bag Change: Convatec two piece cut to fit     *Can shower with pouch on or off. Make sure to dry off pouch after shower.      Pouch Change Steps:  1. Peel back pouch using push-pull method, may use non-alcohol adhesive remover. Remove pouch from top to bottom.   2. Use warm water only to cleanse skin around the stoma (marcel-stomal skin).   3. Make sure all adhesive residue is removed and skin is dry and not oily.  4. Measure stoma size using measuring guide and trace correct measurements onto back of pouch.  5. Then cut or mold the backing of pouch out to correct shape/size.  6. Use 3M no sting barrier film to prep the intact skin.   7. Apply calcium alginate to wound beds and cover with hydrocolloid dressings (cut ostomy size out onto hydrocolloid and apply around ostomy, cover any other wounds with additional hydrocolloid dressings)  7. Place pouch over stoma and onto skin.  8. Mold 2 inch elyse ring circumferentially around stoma and place on top of hydrocolloid.  9. Use warmth of hand to apply gentle pressure to help backing of pouch to adhere well to skin.  10. Apply Brava Barrier Extenders circumferentially around pouch wafer.  11. Apply ostomy belt.      TIPS:  Empty pouch when 1/3 -1/2 full.   Change pouch every 2-3 days or if signs of leaking.

## 2025-05-29 NOTE — PROGRESS NOTES
Patient ID: Octavio Atkins is a 80 y.o. male Date of Birth 1945       Chief Complaint   Patient presents with    Follow Up Wound Care Visit     Peristomal wound       Allergies:  Aspirin and Ibuprofen    Diagnosis:      Diagnosis ICD-10-CM Associated Orders   1. Colostomy in place ([facility])  Z93.3 Wound cleansing and dressings      2. Open wound of abdomen, initial encounter  S31.109A Wound cleansing and dressings      3. Type 2 diabetes mellitus with hyperglycemia, without long-term current use of insulin ([facility])  E11.65 Wound cleansing and dressings      4. Encounter for ostomy nurse consultation  Z71.89 Wound cleansing and dressings              Assessment & Plan:  Patient seen in office today in conjunction with WOCN.   Peristomal wounds are greatly improved, only 2 small partial-thickness open aspects remain.  Recommend to continue with silver alginate and hydrocolloid to these open areas.  Recommend to continue with current pouching system.  Suspect that once dressings are little longer needed underneath the ostomy barrier this should help with the leaking.  Recommend to change pouch and dressings every 2 to 3 days and as needed for leaking.  No clinical signs symptoms of infection present.  Follow-up in 3 weeks, call sooner with questions or concerns.  Patient and wife verbalized understanding of plan of care.         Subjective:   4/23/25: Patient presents to the ostomy clinic for initial visit of peristomal skin breakdown, accompanied by his wife who is involved in his care and provides all of his ostomy care.  Patient has history of colostomy creation approximately 6 years ago at hospital in [place].  States that colostomy was created as part of cancer treatment.  Patient is currently under care of oncology for metastatic cancer, states that he began to develop blisters on his skin and around his stoma recently, which were felt to be related to his Keytruda infusions which have been stopped by his  oncology team.  Patient states that since stopping the medication the blisters have improved.  Wife and patient report reduced wear time of pouch since the blisters have been present underneath the ostomy wafer.  Patient reports that when his skin is intact he has a 4-day wear time with rare leaking episodes.  He is currently in a 2 piece cut to fit ConvaTec pouch, wife uses barrier ring, ostomy paste and barrier extenders with pouch changes as well.  Patient offers no other stomal or abdominal related complaints.  Reports stoma is functioning without issue.  No fever or chills.    5/29/25: Patient presents to the ostomy clinic for follow-up visit of peristomal skin breakdown accompanied by his wife.  Patient states that they have been experiencing leaking recently due to the dressings being underneath the ostomy barrier, wife reports seeing stool undermining the dressings. Patient and wife reports marcel-stomal wounds are greatly improved. Patient has been using silver alginate and hydrcolloid dressings to the wounds. Patient offers no other stomal or abdominal related complaints.  Reports stoma is functioning without issue.  No fever or chills.        The following portions of the patient's history were reviewed and updated as appropriate:   Problem List[1]  Past Medical History[2]  Past Surgical History[3]  Family History[4]   Social History[5]   Current Medications[6]    Review of Systems   Constitutional:  Negative for chills and fever.   HENT:  Negative for congestion and sneezing.    Respiratory:  Negative for cough and shortness of breath.    Cardiovascular:  Negative for chest pain.   Gastrointestinal:  Negative for abdominal distention, abdominal pain, blood in stool, constipation and diarrhea.   Skin:  Positive for wound. Negative for color change and rash.   Psychiatric/Behavioral:  Negative for agitation.        Objective:  There were no vitals taken for this visit.        Physical Exam  Constitutional:        General: He is awake. He is not in acute distress.     Appearance: He is obese. He is not ill-appearing, toxic-appearing or diaphoretic.   HENT:      Head: Normocephalic and atraumatic.      Right Ear: External ear normal.      Left Ear: External ear normal.     Eyes:      Conjunctiva/sclera: Conjunctivae normal.     Pulmonary:      Effort: Pulmonary effort is normal. No respiratory distress.   Abdominal:      General: There is no distension.      Palpations: Abdomen is soft.      Tenderness: There is no abdominal tenderness. There is no guarding or rebound.      Hernia: No hernia is present.      Comments: Left lower quadrant colostomy.  Ostomy is oval-shaped, moist pink in color.  Stoma is barely budded with os noted at skin level distally.  Mucocutaneous junction is intact without separation.  See skin assessment for peristomal skin findings.  No apparent prolapse or hernia on exam.     Musculoskeletal:      Cervical back: Neck supple.     Skin:     General: Skin is warm and dry.      Findings: Wound present. No erythema or rash.      Comments: 1.  Peristomal skin breakdown is improved.  Only 2 small open partial-thickness wounds remain.  Wound beds with 100% moist pink/red tissue that is producing a small amount of serosanguineous drainage.  No purulent drainage or malodor.  Remainder of peristomal skin is otherwise intact with areas of intact scar tissue and freshly healed epithelialized skin.  No evidence of cellulitis on exam. No blistering of the skin. Refer to flowsheets for additional details.     Neurological:      Mental Status: He is alert and oriented to person, place, and time.     Psychiatric:         Mood and Affect: Mood normal.         Behavior: Behavior normal. Behavior is cooperative.         Wound 04/23/25 Other (Comments) Abdomen Left;Medial;Lower;Superior (Active)   Wound Image   05/29/25 1000   Wound Description Pink 05/29/25 1000   Non-staged Wound Description Partial thickness  05/29/25 1000   Wound Length (cm) 1 cm 05/29/25 1000   Wound Width (cm) 1 cm 05/29/25 1000   Wound Depth (cm) 0.1 cm 05/29/25 1000   Wound Surface Area (cm^2) 0.79 cm^2 05/29/25 1000   Wound Volume (cm^3) 0.052 cm^3 05/29/25 1000   Calculated Wound Volume (cm^3) 0.1 cm^3 05/29/25 1000   Change in Wound Size % 94.74 05/29/25 1000   Drainage Amount Small 05/29/25 1000   Drainage Description Serosanguineous 05/29/25 1000   Kiarra-wound Assessment Fragile 05/29/25 1000   Treatments Cleansed 05/29/25 1000   Wound Site Closure None 05/29/25 1000   Dressing Calcium Alginate;Hydrocolloid 05/29/25 1000   Wound packed? No 05/29/25 1000   Dressing Changed New 05/29/25 1000   Patient Tolerance Tolerated well 05/29/25 1000   Dressing Status Clean;Dry;Intact 05/29/25 1000       Wound 04/23/25 Other (Comments) Abdomen Medial;Lower;Left (Active)   Wound Description Pink 05/29/25 1000   Non-staged Wound Description Partial thickness 05/29/25 1000   Wound Length (cm) 1 cm 05/29/25 1000   Wound Width (cm) 1 cm 05/29/25 1000   Wound Depth (cm) 0.1 cm 05/29/25 1000   Wound Surface Area (cm^2) 0.79 cm^2 05/29/25 1000   Wound Volume (cm^3) 0.052 cm^3 05/29/25 1000   Calculated Wound Volume (cm^3) 0.1 cm^3 05/29/25 1000   Change in Wound Size % 75 05/29/25 1000   Drainage Amount Scant 05/29/25 1000   Drainage Description Serosanguineous 05/29/25 1000   Kiarra-wound Assessment Fragile 05/29/25 1000   Treatments Cleansed 05/29/25 1000   Wound Site Closure None 05/29/25 1000   Dressing Calcium Alginate;Hydrocolloid 05/29/25 1000   Wound packed? No 05/29/25 1000   Dressing Changed New 05/29/25 1000   Patient Tolerance Tolerated well 05/29/25 1000   Dressing Status Clean;Intact;Dry 05/29/25 1000               Lab Results   Component Value Date    HGBA1C 6.5 (H) 02/03/2025       Wound Instructions:  Orders Placed This Encounter   Procedures    Wound cleansing and dressings     Ostomy Care:  -Stoma Measurement: 1 1/8 inch Length by 1 1/4 Inches Width  "     -Appliance Used During Bag Change: Convatec two piece cut to fit     *Can shower with pouch on or off. Make sure to dry off pouch after shower.      Pouch Change Steps:  1. Peel back pouch using push-pull method, may use non-alcohol adhesive remover. Remove pouch from top to bottom.   2. Use warm water only to cleanse skin around the stoma (marcel-stomal skin).   3. Make sure all adhesive residue is removed and skin is dry and not oily.  4. Measure stoma size using measuring guide and trace correct measurements onto back of pouch.  5. Then cut or mold the backing of pouch out to correct shape/size.  6. Use 3M no sting barrier film to prep the intact skin.   7. Apply calcium alginate to wound beds and cover with hydrocolloid dressings (cut ostomy size out onto hydrocolloid and apply around ostomy, cover any other wounds with additional hydrocolloid dressings)  7. Place pouch over stoma and onto skin.  8. Mold 2 inch elyse ring circumferentially around stoma and place on top of hydrocolloid.  9. Use warmth of hand to apply gentle pressure to help backing of pouch to adhere well to skin.  10. Apply Brava Barrier Extenders circumferentially around pouch wafer.  11. Apply ostomy belt.      TIPS:  Empty pouch when 1/3 -1/2 full.   Change pouch every 2-3 days or if signs of leaking.     Standing Status:   Future     Expiration Date:   6/5/2025           AR Balderrama, YG, ALDEN    Portions of the record may have been created with voice recognition software. Occasional wrong word or \"sound alike\" substitutions may have occurred due to the inherent limitations of voice recognition software. Read the chart carefully and recognize, using context, where substitutions have occurred.          Total time spent today:    Total time (face-to-face and non-face-to-face) spent on today's visit was 25 minutes. This includes preparation for the visits (i.e. reviewing test results from date recent hospitalizations, " ER/Urgent Care/primary care visits and recent consultant office visits), performance of a medically appropriate history and examination, and orders for medications or testing.          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)    Essential hypertension    Hypokalemia    Mixed hyperlipidemia    Glaucoma of both eyes    Type 2 diabetes mellitus with hyperglycemia, without long-term current use of insulin (HCC)    Colostomy in place (HCC)    Dysequilibrium    Fatty liver    Left thyroid nodule    Pulmonary emphysema (HCC)    Long term current use of anticoagulant    History of DVT (deep vein thrombosis)   [2]   Past Medical History:  Diagnosis Date    Diabetes mellitus (HCC)     DVT of deep femoral vein, left (HCC) 2023    Hypertension    [3]   Past Surgical History:  Procedure Laterality Date    ELBOW SURGERY Left     pinched nerve   [4]   Family History  Problem Relation Name Age of Onset    Colon cancer Father     [5]   Social History  Socioeconomic History    Marital status: /Civil Union   Tobacco Use    Smoking status: Former     Current packs/day: 0.00     Average packs/day: 1 pack/day for 50.0 years (50.0 ttl pk-yrs)     Types: Cigarettes     Start date:      Quit date:      Years since quittin.4    Smokeless tobacco: Never   Vaping Use    Vaping status: Never Used   Substance and Sexual Activity    Alcohol use: Yes     Alcohol/week: 21.0 standard drinks of alcohol     Types: 21 Glasses of wine per week     Comment: social drinker    Drug use: Never    Sexual activity: Not Currently     Partners: Female     Social Drivers of Health     Financial Resource Strain: Low Risk  (2023)    Overall Financial Resource Strain (CARDIA)     Difficulty of Paying Living Expenses: Not very hard   Food Insecurity: No Food Insecurity (4/15/2025)    Nursing - Inadequate Food Risk Classification     Worried About Running Out of Food in the Last Year: Never  true     Ran Out of Food in the Last Year: Never true   Transportation Needs: No Transportation Needs (4/15/2025)    PRAPARE - Transportation     Lack of Transportation (Medical): No     Lack of Transportation (Non-Medical): No   Housing Stability: Low Risk  (4/15/2025)    Housing Stability Vital Sign     Unable to Pay for Housing in the Last Year: No     Number of Times Moved in the Last Year: 1     Homeless in the Last Year: No   [6]   Current Outpatient Medications:     apixaban (Eliquis) 5 mg, Take 1 tablet (5 mg total) by mouth 2 (two) times a day, Disp: 60 tablet, Rfl: 11    Calcium Carb-Cholecalciferol (CALCIUM 1000 + D PO), Take by mouth 1 daily, Disp: , Rfl:     Cholecalciferol (Vitamin D3) 1.25 MG (73959 UT) CAPS, Take by mouth 1 daily, Disp: , Rfl:     clobetasol (TEMOVATE) 0.05 % ointment, , Disp: , Rfl:     Contour Next Test test strip, USE 1 STRIP TO CHECK GLUCOSE ONCE DAILY, Disp: 100 each, Rfl: 1    Contour Next Test test strip, USE 1 STRIP TO CHECK GLUCOSE ONCE DAILY DX:E11.9, Disp: 100 each, Rfl: 1    dorzolamide-timolol (COSOPT) 22.3-6.8 MG/ML ophthalmic solution, , Disp: , Rfl:     glipiZIDE (GLUCOTROL XL) 2.5 mg 24 hr tablet, Take 1 tablet (2.5 mg total) by mouth daily, Disp: 90 tablet, Rfl: 1    hydroCHLOROthiazide 25 mg tablet, Take 1 tablet by mouth once daily, Disp: 90 tablet, Rfl: 1    Lancet Devices (Microlet Next Lancing Device) MISC, Inject under the skin in the morning, Disp: 100 each, Rfl: 5    lisinopril (ZESTRIL) 20 mg tablet, Take 1 tablet by mouth once daily, Disp: 90 tablet, Rfl: 1    lovastatin (MEVACOR) 20 mg tablet, Take 1 tablet (20 mg total) by mouth daily, Disp: 90 tablet, Rfl: 3    Microlet Lancets MISC, USE 1 NEW LANCET TO CHECK GLUCOSE ONCE DAILY, Disp: 100 each, Rfl: 1    mupirocin (BACTROBAN) 2 % ointment, Apply topically 3 (three) times a day as needed (skin infection), Disp: 1 g, Rfl: 1    potassium chloride (Klor-Con M20) 20 mEq tablet, Take 1 tablet (20 mEq total)  by mouth daily, Disp: 90 tablet, Rfl: 1  No current facility-administered medications for this visit.    Facility-Administered Medications Ordered in Other Visits:     alteplase (CATHFLO) injection 2 mg, 2 mg, Intracatheter, Q1MIN PRN, Ally Lee DO

## 2025-06-11 ENCOUNTER — TELEPHONE (OUTPATIENT)
Dept: ADMINISTRATIVE | Facility: OTHER | Age: 80
End: 2025-06-11

## 2025-06-11 NOTE — TELEPHONE ENCOUNTER
----- Message from Jesika WOODS sent at 6/10/2025  2:36 PM EDT -----  Regarding: dm eye  06/10/25 2:37 PMHello, our patient Octavio Atkins has had Diabetic Eye Exam completed/performed. Please assist in updating the patient chart by pulling the document from the Media Tab. The date of service is 04/10/2025. Thank you,IZAIAH [facility]

## 2025-06-12 NOTE — TELEPHONE ENCOUNTER
Upon review of the In Basket request we were able to locate, review, and update the patient chart as requested for Diabetic Eye Exam.    Any additional questions or concerns should be emailed to the Practice Liaisons via the appropriate education email address, please do not reply via In Basket.    Thank you  Eber English MA   [facility]

## 2025-06-30 DIAGNOSIS — I82.4Y2 ACUTE DEEP VEIN THROMBOSIS (DVT) OF PROXIMAL VEIN OF LEFT LOWER EXTREMITY (HCC): ICD-10-CM

## 2025-06-30 NOTE — TELEPHONE ENCOUNTER
Patient requesting script for 1 month to be sent to [facility] pharmacy, in wish he will then reapply for assistance due to needing to me out of pocket expenses.

## 2025-07-21 ENCOUNTER — HOSPITAL ENCOUNTER (OUTPATIENT)
Dept: INFUSION CENTER | Facility: HOSPITAL | Age: 80
Discharge: HOME | End: 2025-07-21
Payer: MEDICARE

## 2025-07-21 DIAGNOSIS — E11.65 TYPE 2 DIABETES MELLITUS WITH HYPERGLYCEMIA, WITHOUT LONG-TERM CURRENT USE OF INSULIN (HCC): ICD-10-CM

## 2025-07-21 DIAGNOSIS — C79.51 NSCLC METASTATIC TO BONE (HCC): Primary | ICD-10-CM

## 2025-07-21 DIAGNOSIS — C34.90 NSCLC METASTATIC TO BONE (HCC): Primary | ICD-10-CM

## 2025-07-21 LAB
ALBUMIN SERPL BCG-MCNC: 3.6 G/DL (ref 3.5–5)
ALP SERPL-CCNC: 103 U/L (ref 34–104)
ALT SERPL W P-5'-P-CCNC: 22 U/L (ref 7–52)
ANION GAP SERPL CALCULATED.3IONS-SCNC: 6 MMOL/L (ref 4–13)
AST SERPL W P-5'-P-CCNC: 25 U/L (ref 13–39)
BILIRUB SERPL-MCNC: 0.9 MG/DL (ref 0.2–1)
BUN SERPL-MCNC: 17 MG/DL (ref 5–25)
CALCIUM SERPL-MCNC: 8.7 MG/DL (ref 8.4–10.2)
CHLORIDE SERPL-SCNC: 106 MMOL/L (ref 96–108)
CO2 SERPL-SCNC: 27 MMOL/L (ref 21–32)
CREAT SERPL-MCNC: 0.81 MG/DL (ref 0.6–1.3)
ERYTHROCYTE [DISTWIDTH] IN BLOOD BY AUTOMATED COUNT: 14.6 % (ref 11.6–15.1)
EST. AVERAGE GLUCOSE BLD GHB EST-MCNC: 126 MG/DL
GFR SERPL CREATININE-BSD FRML MDRD: 83 ML/MIN/1.73SQ M
GLUCOSE SERPL-MCNC: 132 MG/DL (ref 65–140)
HBA1C MFR BLD: 6 %
HCT VFR BLD AUTO: 44.4 % (ref 36.5–49.3)
HGB BLD-MCNC: 15.1 G/DL (ref 12–17)
MCH RBC QN AUTO: 32.3 PG (ref 26.8–34.3)
MCHC RBC AUTO-ENTMCNC: 34 G/DL (ref 31.4–37.4)
MCV RBC AUTO: 95 FL (ref 82–98)
PLATELET # BLD AUTO: 166 THOUSANDS/UL (ref 149–390)
PMV BLD AUTO: 11 FL (ref 8.9–12.7)
POTASSIUM SERPL-SCNC: 3.6 MMOL/L (ref 3.5–5.3)
PROT SERPL-MCNC: 6.8 G/DL (ref 6.4–8.4)
RBC # BLD AUTO: 4.67 MILLION/UL (ref 3.88–5.62)
SODIUM SERPL-SCNC: 139 MMOL/L (ref 135–147)
WBC # BLD AUTO: 3.67 THOUSAND/UL (ref 4.31–10.16)

## 2025-07-21 PROCEDURE — 83036 HEMOGLOBIN GLYCOSYLATED A1C: CPT

## 2025-07-21 PROCEDURE — 85027 COMPLETE CBC AUTOMATED: CPT

## 2025-07-21 PROCEDURE — 80053 COMPREHEN METABOLIC PANEL: CPT

## 2025-07-21 NOTE — PROGRESS NOTES
Octavio Atkins  tolerated treatment well with no complications.      Octavio Atkins is aware of future appt on 8/6/25 at 1200.     AVS printed and given to Octavio Atkins:  Yes

## 2025-07-28 DIAGNOSIS — I82.4Y2 ACUTE DEEP VEIN THROMBOSIS (DVT) OF PROXIMAL VEIN OF LEFT LOWER EXTREMITY (HCC): ICD-10-CM

## 2025-07-29 ENCOUNTER — TELEPHONE (OUTPATIENT)
Dept: HEMATOLOGY ONCOLOGY | Facility: CLINIC | Age: 80
End: 2025-07-29

## 2025-07-31 DIAGNOSIS — E11.65 TYPE 2 DIABETES MELLITUS WITH HYPERGLYCEMIA, WITHOUT LONG-TERM CURRENT USE OF INSULIN (HCC): ICD-10-CM

## 2025-07-31 RX ORDER — GLIPIZIDE 2.5 MG/1
2.5 TABLET, EXTENDED RELEASE ORAL DAILY
Qty: 90 TABLET | Refills: 0 | Status: SHIPPED | OUTPATIENT
Start: 2025-07-31

## 2025-08-04 ENCOUNTER — HOSPITAL ENCOUNTER (OUTPATIENT)
Dept: CT IMAGING | Facility: HOSPITAL | Age: 80
Discharge: HOME | End: 2025-08-04
Attending: INTERNAL MEDICINE
Payer: MEDICARE

## 2025-08-04 DIAGNOSIS — C79.51 NSCLC METASTATIC TO BONE (HCC): ICD-10-CM

## 2025-08-04 DIAGNOSIS — C34.90 NSCLC METASTATIC TO BONE (HCC): ICD-10-CM

## 2025-08-04 PROCEDURE — 71250 CT THORAX DX C-: CPT

## 2025-08-05 ENCOUNTER — TELEPHONE (OUTPATIENT)
Age: 80
End: 2025-08-05

## 2025-08-06 ENCOUNTER — HOSPITAL ENCOUNTER (OUTPATIENT)
Dept: INFUSION CENTER | Facility: HOSPITAL | Age: 80
Discharge: HOME | End: 2025-08-06
Payer: MEDICARE

## 2025-08-12 ENCOUNTER — OFFICE VISIT (OUTPATIENT)
Dept: FAMILY MEDICINE CLINIC | Facility: HOSPITAL | Age: 80
End: 2025-08-12
Payer: MEDICARE

## 2025-08-12 PROBLEM — R42 DYSEQUILIBRIUM: Status: RESOLVED | Noted: 2022-06-14 | Resolved: 2025-08-12

## 2025-08-13 ENCOUNTER — OFFICE VISIT (OUTPATIENT)
Age: 80
End: 2025-08-13
Payer: MEDICARE

## 2025-08-13 ENCOUNTER — TELEPHONE (OUTPATIENT)
Age: 80
End: 2025-08-13

## 2025-08-21 DIAGNOSIS — E11.65 TYPE 2 DIABETES MELLITUS WITH HYPERGLYCEMIA, WITHOUT LONG-TERM CURRENT USE OF INSULIN (HCC): ICD-10-CM

## 2025-08-22 RX ORDER — LANCETS
EACH MISCELLANEOUS
Qty: 100 EACH | Refills: 1 | Status: SHIPPED | OUTPATIENT
Start: 2025-08-22